# Patient Record
Sex: MALE | Race: WHITE | Employment: OTHER | URBAN - METROPOLITAN AREA
[De-identification: names, ages, dates, MRNs, and addresses within clinical notes are randomized per-mention and may not be internally consistent; named-entity substitution may affect disease eponyms.]

---

## 2017-01-12 DIAGNOSIS — M19.90 ARTHRITIS: ICD-10-CM

## 2017-01-12 RX ORDER — HYDROCODONE BITARTRATE AND ACETAMINOPHEN 5; 325 MG/1; MG/1
TABLET ORAL
Qty: 90 TABLET | Refills: 0 | Status: SHIPPED | OUTPATIENT
Start: 2017-01-12 | End: 2017-02-13 | Stop reason: SDUPTHER

## 2017-01-20 ENCOUNTER — OFFICE VISIT (OUTPATIENT)
Dept: FAMILY MEDICINE CLINIC | Age: 67
End: 2017-01-20

## 2017-01-20 VITALS
SYSTOLIC BLOOD PRESSURE: 130 MMHG | BODY MASS INDEX: 27.39 KG/M2 | DIASTOLIC BLOOD PRESSURE: 84 MMHG | OXYGEN SATURATION: 97 % | WEIGHT: 196.4 LBS | HEART RATE: 86 BPM

## 2017-01-20 DIAGNOSIS — M15.9 PRIMARY OSTEOARTHRITIS INVOLVING MULTIPLE JOINTS: Primary | ICD-10-CM

## 2017-01-20 DIAGNOSIS — F17.200 SMOKER UNMOTIVATED TO QUIT: ICD-10-CM

## 2017-01-20 DIAGNOSIS — I10 ESSENTIAL HYPERTENSION: ICD-10-CM

## 2017-01-20 PROBLEM — M15.0 PRIMARY OSTEOARTHRITIS INVOLVING MULTIPLE JOINTS: Status: ACTIVE | Noted: 2017-01-20

## 2017-01-20 PROCEDURE — 3288F FALL RISK ASSESSMENT DOCD: CPT | Performed by: FAMILY MEDICINE

## 2017-01-20 PROCEDURE — 99214 OFFICE O/P EST MOD 30 MIN: CPT | Performed by: FAMILY MEDICINE

## 2017-02-13 ENCOUNTER — TELEPHONE (OUTPATIENT)
Dept: FAMILY MEDICINE CLINIC | Age: 67
End: 2017-02-13

## 2017-02-13 DIAGNOSIS — M19.90 ARTHRITIS: ICD-10-CM

## 2017-02-13 RX ORDER — HYDROCODONE BITARTRATE AND ACETAMINOPHEN 5; 325 MG/1; MG/1
TABLET ORAL
Qty: 90 TABLET | Refills: 0 | Status: SHIPPED | OUTPATIENT
Start: 2017-02-13 | End: 2017-03-10 | Stop reason: SDUPTHER

## 2017-03-10 ENCOUNTER — TELEPHONE (OUTPATIENT)
Dept: FAMILY MEDICINE CLINIC | Age: 67
End: 2017-03-10

## 2017-03-10 DIAGNOSIS — M19.90 ARTHRITIS: ICD-10-CM

## 2017-03-13 RX ORDER — HYDROCODONE BITARTRATE AND ACETAMINOPHEN 5; 325 MG/1; MG/1
TABLET ORAL
Qty: 90 TABLET | Refills: 0 | Status: SHIPPED | OUTPATIENT
Start: 2017-03-13 | End: 2017-04-10 | Stop reason: SDUPTHER

## 2017-04-10 ENCOUNTER — TELEPHONE (OUTPATIENT)
Dept: FAMILY MEDICINE CLINIC | Age: 67
End: 2017-04-10

## 2017-04-10 DIAGNOSIS — M19.90 ARTHRITIS: ICD-10-CM

## 2017-04-11 RX ORDER — HYDROCODONE BITARTRATE AND ACETAMINOPHEN 5; 325 MG/1; MG/1
TABLET ORAL
Qty: 90 TABLET | Refills: 0 | Status: SHIPPED | OUTPATIENT
Start: 2017-04-11 | End: 2017-05-10 | Stop reason: SDUPTHER

## 2017-04-18 ENCOUNTER — HOSPITAL ENCOUNTER (OUTPATIENT)
Dept: OTHER | Age: 67
Discharge: HOME | End: 2017-04-18
Attending: FAMILY MEDICINE | Admitting: FAMILY MEDICINE

## 2017-04-18 LAB
A/G RATIO: 1.7 (ref 1.1–2.2)
ALBUMIN SERPL-MCNC: 4.6 G/DL (ref 3.4–5)
ALP BLD-CCNC: 108 U/L (ref 40–129)
ALT SERPL-CCNC: 16 U/L (ref 10–40)
ANION GAP SERPL CALCULATED.3IONS-SCNC: 14 MMOL/L (ref 3–16)
AST SERPL-CCNC: 20 U/L (ref 15–37)
BILIRUB SERPL-MCNC: 0.3 MG/DL (ref 0–1)
BUN BLDV-MCNC: 19 MG/DL (ref 7–20)
CALCIUM SERPL-MCNC: 9 MG/DL (ref 8.3–10.6)
CHLORIDE BLD-SCNC: 101 MMOL/L (ref 99–110)
CHOLESTEROL, TOTAL: 246 MG/DL (ref 0–199)
CO2: 25 MMOL/L (ref 21–32)
CREAT SERPL-MCNC: 0.9 MG/DL (ref 0.8–1.3)
ESTIMATED AVERAGE GLUCOSE: 119.8 MG/DL
GFR AFRICAN AMERICAN: >60
GFR NON-AFRICAN AMERICAN: >60
GLOBULIN: 2.7 G/DL
GLUCOSE BLD-MCNC: 109 MG/DL (ref 70–99)
HBA1C MFR BLD: 5.8 %
HDLC SERPL-MCNC: 42 MG/DL (ref 40–60)
LDL CHOLESTEROL CALCULATED: ABNORMAL MG/DL
LDL CHOLESTEROL DIRECT: 146 MG/DL
POTASSIUM SERPL-SCNC: 4.6 MMOL/L (ref 3.5–5.1)
PROSTATE SPECIFIC ANTIGEN: 1.58 NG/ML (ref 0–4)
SODIUM BLD-SCNC: 140 MMOL/L (ref 136–145)
TOTAL PROTEIN: 7.3 G/DL (ref 6.4–8.2)
TRIGL SERPL-MCNC: 324 MG/DL (ref 0–150)
TSH SERPL DL<=0.05 MIU/L-ACNC: 1.1 UIU/ML (ref 0.27–4.2)
VLDLC SERPL CALC-MCNC: ABNORMAL MG/DL

## 2017-04-21 ENCOUNTER — OFFICE VISIT (OUTPATIENT)
Dept: FAMILY MEDICINE CLINIC | Age: 67
End: 2017-04-21

## 2017-04-21 VITALS
BODY MASS INDEX: 27.06 KG/M2 | DIASTOLIC BLOOD PRESSURE: 88 MMHG | WEIGHT: 194 LBS | SYSTOLIC BLOOD PRESSURE: 140 MMHG | RESPIRATION RATE: 12 BRPM | HEART RATE: 81 BPM | OXYGEN SATURATION: 97 %

## 2017-04-21 DIAGNOSIS — M54.50 ACUTE BILATERAL LOW BACK PAIN WITHOUT SCIATICA: ICD-10-CM

## 2017-04-21 DIAGNOSIS — Z23 NEED FOR STREPTOCOCCUS PNEUMONIAE VACCINATION: ICD-10-CM

## 2017-04-21 DIAGNOSIS — R73.9 HYPERGLYCEMIA: Primary | ICD-10-CM

## 2017-04-21 DIAGNOSIS — I10 ESSENTIAL HYPERTENSION: ICD-10-CM

## 2017-04-21 DIAGNOSIS — E78.2 MIXED HYPERLIPIDEMIA: ICD-10-CM

## 2017-04-21 DIAGNOSIS — F17.200 SMOKER UNMOTIVATED TO QUIT: ICD-10-CM

## 2017-04-21 DIAGNOSIS — M15.9 PRIMARY OSTEOARTHRITIS INVOLVING MULTIPLE JOINTS: ICD-10-CM

## 2017-04-21 PROCEDURE — G8510 SCR DEP NEG, NO PLAN REQD: HCPCS | Performed by: FAMILY MEDICINE

## 2017-04-21 PROCEDURE — G0009 ADMIN PNEUMOCOCCAL VACCINE: HCPCS | Performed by: FAMILY MEDICINE

## 2017-04-21 PROCEDURE — 99214 OFFICE O/P EST MOD 30 MIN: CPT | Performed by: FAMILY MEDICINE

## 2017-04-21 PROCEDURE — 3288F FALL RISK ASSESSMENT DOCD: CPT | Performed by: FAMILY MEDICINE

## 2017-04-21 PROCEDURE — 90670 PCV13 VACCINE IM: CPT | Performed by: FAMILY MEDICINE

## 2017-04-21 RX ORDER — MELOXICAM 15 MG/1
15 TABLET ORAL DAILY
Qty: 30 TABLET | Refills: 0 | Status: SHIPPED | OUTPATIENT
Start: 2017-04-21 | End: 2017-07-26

## 2017-04-21 ASSESSMENT — PATIENT HEALTH QUESTIONNAIRE - PHQ9
SUM OF ALL RESPONSES TO PHQ9 QUESTIONS 1 & 2: 0
2. FEELING DOWN, DEPRESSED OR HOPELESS: 0
1. LITTLE INTEREST OR PLEASURE IN DOING THINGS: 0
SUM OF ALL RESPONSES TO PHQ QUESTIONS 1-9: 0

## 2017-05-10 DIAGNOSIS — M19.90 ARTHRITIS: ICD-10-CM

## 2017-05-10 RX ORDER — HYDROCODONE BITARTRATE AND ACETAMINOPHEN 5; 325 MG/1; MG/1
TABLET ORAL
Qty: 90 TABLET | Refills: 0 | Status: SHIPPED | OUTPATIENT
Start: 2017-05-10 | End: 2017-06-06 | Stop reason: SDUPTHER

## 2017-06-06 DIAGNOSIS — M19.90 ARTHRITIS: ICD-10-CM

## 2017-06-09 RX ORDER — HYDROCODONE BITARTRATE AND ACETAMINOPHEN 5; 325 MG/1; MG/1
TABLET ORAL
Qty: 90 TABLET | Refills: 0 | Status: SHIPPED | OUTPATIENT
Start: 2017-06-09 | End: 2017-07-06 | Stop reason: SDUPTHER

## 2017-06-19 DIAGNOSIS — I10 ESSENTIAL HYPERTENSION: ICD-10-CM

## 2017-06-19 RX ORDER — LISINOPRIL AND HYDROCHLOROTHIAZIDE 20; 12.5 MG/1; MG/1
TABLET ORAL
Qty: 180 TABLET | Refills: 0 | Status: SHIPPED | OUTPATIENT
Start: 2017-06-19 | End: 2017-09-17 | Stop reason: SDUPTHER

## 2017-07-06 DIAGNOSIS — M19.90 ARTHRITIS: ICD-10-CM

## 2017-07-06 RX ORDER — HYDROCODONE BITARTRATE AND ACETAMINOPHEN 5; 325 MG/1; MG/1
TABLET ORAL
Qty: 90 TABLET | Refills: 0 | Status: SHIPPED | OUTPATIENT
Start: 2017-07-06 | End: 2017-08-03 | Stop reason: SDUPTHER

## 2017-07-21 ENCOUNTER — TELEPHONE (OUTPATIENT)
Dept: FAMILY MEDICINE CLINIC | Age: 67
End: 2017-07-21

## 2017-07-26 ENCOUNTER — OFFICE VISIT (OUTPATIENT)
Dept: FAMILY MEDICINE CLINIC | Age: 67
End: 2017-07-26

## 2017-07-26 VITALS
SYSTOLIC BLOOD PRESSURE: 140 MMHG | HEART RATE: 84 BPM | BODY MASS INDEX: 25.9 KG/M2 | HEIGHT: 71 IN | DIASTOLIC BLOOD PRESSURE: 84 MMHG | OXYGEN SATURATION: 98 % | WEIGHT: 185 LBS

## 2017-07-26 DIAGNOSIS — E78.2 MIXED HYPERLIPIDEMIA: ICD-10-CM

## 2017-07-26 DIAGNOSIS — I10 ESSENTIAL HYPERTENSION: ICD-10-CM

## 2017-07-26 DIAGNOSIS — F17.200 SMOKER UNMOTIVATED TO QUIT: ICD-10-CM

## 2017-07-26 DIAGNOSIS — M15.9 PRIMARY OSTEOARTHRITIS INVOLVING MULTIPLE JOINTS: Primary | ICD-10-CM

## 2017-07-26 DIAGNOSIS — R73.9 HYPERGLYCEMIA: ICD-10-CM

## 2017-07-26 PROCEDURE — 99214 OFFICE O/P EST MOD 30 MIN: CPT | Performed by: FAMILY MEDICINE

## 2017-08-03 ENCOUNTER — TELEPHONE (OUTPATIENT)
Dept: FAMILY MEDICINE CLINIC | Age: 67
End: 2017-08-03

## 2017-08-03 DIAGNOSIS — M19.90 ARTHRITIS: ICD-10-CM

## 2017-08-04 RX ORDER — HYDROCODONE BITARTRATE AND ACETAMINOPHEN 5; 325 MG/1; MG/1
TABLET ORAL
Qty: 90 TABLET | Refills: 0 | Status: SHIPPED | OUTPATIENT
Start: 2017-08-04 | End: 2017-08-31 | Stop reason: SDUPTHER

## 2017-08-31 ENCOUNTER — TELEPHONE (OUTPATIENT)
Dept: FAMILY MEDICINE CLINIC | Age: 67
End: 2017-08-31

## 2017-08-31 DIAGNOSIS — M19.90 ARTHRITIS: ICD-10-CM

## 2017-08-31 RX ORDER — HYDROCODONE BITARTRATE AND ACETAMINOPHEN 5; 325 MG/1; MG/1
TABLET ORAL
Qty: 90 TABLET | Refills: 0 | Status: SHIPPED | OUTPATIENT
Start: 2017-08-31 | End: 2017-09-28 | Stop reason: SDUPTHER

## 2017-09-17 DIAGNOSIS — I10 ESSENTIAL HYPERTENSION: ICD-10-CM

## 2017-09-18 RX ORDER — LISINOPRIL AND HYDROCHLOROTHIAZIDE 20; 12.5 MG/1; MG/1
TABLET ORAL
Qty: 180 TABLET | Refills: 0 | Status: SHIPPED | OUTPATIENT
Start: 2017-09-18 | End: 2017-12-17 | Stop reason: SDUPTHER

## 2017-09-28 ENCOUNTER — TELEPHONE (OUTPATIENT)
Dept: FAMILY MEDICINE CLINIC | Age: 67
End: 2017-09-28

## 2017-09-28 DIAGNOSIS — M19.90 ARTHRITIS: ICD-10-CM

## 2017-09-28 RX ORDER — HYDROCODONE BITARTRATE AND ACETAMINOPHEN 5; 325 MG/1; MG/1
TABLET ORAL
Qty: 90 TABLET | Refills: 0 | Status: SHIPPED | OUTPATIENT
Start: 2017-09-28 | End: 2017-10-23 | Stop reason: SDUPTHER

## 2017-10-23 ENCOUNTER — OFFICE VISIT (OUTPATIENT)
Dept: FAMILY MEDICINE CLINIC | Age: 67
End: 2017-10-23

## 2017-10-23 VITALS
RESPIRATION RATE: 12 BRPM | SYSTOLIC BLOOD PRESSURE: 142 MMHG | WEIGHT: 189.25 LBS | DIASTOLIC BLOOD PRESSURE: 92 MMHG | OXYGEN SATURATION: 96 % | BODY MASS INDEX: 26.4 KG/M2 | HEART RATE: 86 BPM

## 2017-10-23 DIAGNOSIS — M15.9 PRIMARY OSTEOARTHRITIS INVOLVING MULTIPLE JOINTS: Primary | ICD-10-CM

## 2017-10-23 DIAGNOSIS — S29.019A THORACIC MYOFASCIAL STRAIN, INITIAL ENCOUNTER: ICD-10-CM

## 2017-10-23 DIAGNOSIS — Z23 NEED FOR INFLUENZA VACCINATION: ICD-10-CM

## 2017-10-23 DIAGNOSIS — I10 ESSENTIAL HYPERTENSION: ICD-10-CM

## 2017-10-23 PROCEDURE — 99214 OFFICE O/P EST MOD 30 MIN: CPT | Performed by: FAMILY MEDICINE

## 2017-10-23 PROCEDURE — G0008 ADMIN INFLUENZA VIRUS VAC: HCPCS | Performed by: FAMILY MEDICINE

## 2017-10-23 PROCEDURE — 90662 IIV NO PRSV INCREASED AG IM: CPT | Performed by: FAMILY MEDICINE

## 2017-10-23 RX ORDER — PREDNISONE 20 MG/1
TABLET ORAL
Qty: 15 TABLET | Refills: 0 | Status: SHIPPED | OUTPATIENT
Start: 2017-10-23 | End: 2017-12-22 | Stop reason: ALTCHOICE

## 2017-10-23 RX ORDER — HYDROCODONE BITARTRATE AND ACETAMINOPHEN 5; 325 MG/1; MG/1
TABLET ORAL
Qty: 90 TABLET | Refills: 0 | Status: SHIPPED | OUTPATIENT
Start: 2017-10-23 | End: 2017-11-28 | Stop reason: SDUPTHER

## 2017-10-23 NOTE — PROGRESS NOTES
Subjective:      Patient ID: Corinne Arthur is a 78 y.o. male. HPI Patient presents for re-evaluation of chronic health problems. Pt will like to discuss possible pulled muscle on his back. Patient is very cautious with his activity because of his chronic recurrent back pain. All his discomfort currently is an upper portion of his back are dominantly on the right interscapular area. Lower back overall is doing well. Patient does have some stressors in regards to his wife's health. Patient denies any GI or urinary symptoms. Patient continues to work full-time    Review of Systems  Patient Active Problem List   Diagnosis    Impotence of organic origin    Hyperglycemia    Mixed hyperlipidemia    Essential hypertension    Smoker unmotivated to quit    [facility]bestosis ([facility] [address]    Idiopathic gout    Primary osteoarthritis involving multiple joints       Outpatient Prescriptions Marked as Taking for the 10/23/17 encounter (Office Visit) with Antoinette Richards MD   Medication Sig Dispense Refill    HYDROcodone-acetaminophen (NORCO) 5-325 MG per tablet Take one tablet three times daily as needed. Earliest Fill Date: 9/28/17 90 tablet 0    lisinopril-hydrochlorothiazide (PRINZIDE;ZESTORETIC) 20-12.5 MG per tablet TAKE 1 TABLET TWICE A  tablet 0       No Known Allergies    Social History   Substance Use Topics    Smoking status: Current Every Day Smoker    Smokeless tobacco: Never Used    Alcohol use Yes      Comment: occasional alcohol use       BP (!) 142/92 (Site: Right Arm, Position: Sitting, Cuff Size: Large Adult)   Pulse 86   Resp 12   Wt 189 lb 4 oz (85.8 kg)   SpO2 96%   BMI 26.40 kg/m²     Objective:   Physical Exam   Constitutional: He appears well-developed and well-nourished. He is cooperative. Neck: Carotid bruit is not present. Cardiovascular: Normal rate, regular rhythm and normal heart sounds. No murmur heard.   Pulses:       Dorsalis pedis pulses are 2+ on the right side, and 2+

## 2017-11-22 ENCOUNTER — TELEPHONE (OUTPATIENT)
Dept: FAMILY MEDICINE CLINIC | Age: 67
End: 2017-11-22

## 2017-11-28 DIAGNOSIS — M15.9 PRIMARY OSTEOARTHRITIS INVOLVING MULTIPLE JOINTS: ICD-10-CM

## 2017-11-28 RX ORDER — HYDROCODONE BITARTRATE AND ACETAMINOPHEN 5; 325 MG/1; MG/1
TABLET ORAL
Qty: 90 TABLET | Refills: 0 | Status: SHIPPED | OUTPATIENT
Start: 2017-11-28 | End: 2017-12-27 | Stop reason: SDUPTHER

## 2017-12-17 DIAGNOSIS — I10 ESSENTIAL HYPERTENSION: ICD-10-CM

## 2017-12-18 RX ORDER — LISINOPRIL AND HYDROCHLOROTHIAZIDE 20; 12.5 MG/1; MG/1
TABLET ORAL
Qty: 180 TABLET | Refills: 0 | Status: SHIPPED | OUTPATIENT
Start: 2017-12-18 | End: 2018-01-22 | Stop reason: SDUPTHER

## 2017-12-21 ENCOUNTER — TELEPHONE (OUTPATIENT)
Dept: FAMILY MEDICINE CLINIC | Age: 67
End: 2017-12-21

## 2017-12-22 ENCOUNTER — TELEPHONE (OUTPATIENT)
Dept: FAMILY MEDICINE CLINIC | Age: 67
End: 2017-12-22

## 2017-12-22 ENCOUNTER — OFFICE VISIT (OUTPATIENT)
Dept: FAMILY MEDICINE CLINIC | Age: 67
End: 2017-12-22

## 2017-12-22 VITALS
DIASTOLIC BLOOD PRESSURE: 84 MMHG | HEART RATE: 88 BPM | SYSTOLIC BLOOD PRESSURE: 138 MMHG | BODY MASS INDEX: 26.46 KG/M2 | HEIGHT: 71 IN | WEIGHT: 189 LBS

## 2017-12-22 DIAGNOSIS — M10.9 ACUTE GOUT INVOLVING TOE OF RIGHT FOOT, UNSPECIFIED CAUSE: Primary | ICD-10-CM

## 2017-12-22 PROCEDURE — 99213 OFFICE O/P EST LOW 20 MIN: CPT | Performed by: PHYSICIAN ASSISTANT

## 2017-12-22 RX ORDER — COLCHICINE 0.6 MG/1
TABLET ORAL
Qty: 30 TABLET | Refills: 3 | Status: SHIPPED | OUTPATIENT
Start: 2017-12-22 | End: 2018-03-03

## 2017-12-22 ASSESSMENT — ENCOUNTER SYMPTOMS: COLOR CHANGE: 1

## 2017-12-22 NOTE — PROGRESS NOTES
Subjective:      Patient ID: Dereck Cervantes is a 78 y.o. male. HPI  Patient is here today with a 3 day history of right great toe pain. He has gout. He says he hasn't had an attack in a couple years. He usually drinks 3-4 beers nightly. He hasn't changed his diet lately either. He says that hasn't changed. He has had no fever, body aches or chills. He already takes Norco for chronic pain issues. Review of Systems   Constitutional: Negative for appetite change, chills and fever. Musculoskeletal: Positive for arthralgias (right great toe pain). Skin: Positive for color change. Neurological: Negative for dizziness, numbness and headaches. Objective:   Physical Exam   Constitutional: He is oriented to person, place, and time. Vital signs are normal. He appears well-developed and well-nourished. He is cooperative. Musculoskeletal:   Tender to palpate right great toe at 1st MTP joint, able to move his toe but it is painful with movement, limping, pedal pulses intact   Neurological: He is alert and oriented to person, place, and time. No sensory deficit. Skin: Skin is warm, dry and intact. There is erythema (right great toe). Assessment:      Teodora Starks was seen today for gout. Diagnoses and all orders for this visit:    Acute gout involving toe of right foot, unspecified cause  -     colchicine (COLCRYS) 0.6 MG tablet; Take BID until symptoms getting better then stop  -     URIC ACID; Future             Plan:      Get uric acid level.

## 2017-12-22 NOTE — TELEPHONE ENCOUNTER
colchicine (COLCRYS) 0.6 MG tablet 30 tablet 3 12/22/2017     Sig: Take BID until symptoms getting better then stop      Per pt's spouse the above is not covered by insurance    Is there an alternative?     Please send to Ian Dillon

## 2017-12-27 DIAGNOSIS — M15.9 PRIMARY OSTEOARTHRITIS INVOLVING MULTIPLE JOINTS: ICD-10-CM

## 2017-12-27 RX ORDER — HYDROCODONE BITARTRATE AND ACETAMINOPHEN 5; 325 MG/1; MG/1
TABLET ORAL
Qty: 90 TABLET | Refills: 0 | Status: SHIPPED | OUTPATIENT
Start: 2017-12-27 | End: 2018-01-22 | Stop reason: SDUPTHER

## 2018-01-22 ENCOUNTER — OFFICE VISIT (OUTPATIENT)
Dept: FAMILY MEDICINE CLINIC | Age: 68
End: 2018-01-22

## 2018-01-22 VITALS
DIASTOLIC BLOOD PRESSURE: 90 MMHG | BODY MASS INDEX: 27.27 KG/M2 | RESPIRATION RATE: 12 BRPM | WEIGHT: 195.5 LBS | SYSTOLIC BLOOD PRESSURE: 148 MMHG | HEART RATE: 100 BPM | OXYGEN SATURATION: 97 %

## 2018-01-22 DIAGNOSIS — I10 ESSENTIAL HYPERTENSION: Primary | ICD-10-CM

## 2018-01-22 DIAGNOSIS — R73.9 HYPERGLYCEMIA: ICD-10-CM

## 2018-01-22 DIAGNOSIS — F17.200 SMOKER UNMOTIVATED TO QUIT: ICD-10-CM

## 2018-01-22 DIAGNOSIS — M15.9 PRIMARY OSTEOARTHRITIS INVOLVING MULTIPLE JOINTS: ICD-10-CM

## 2018-01-22 PROCEDURE — 3288F FALL RISK ASSESSMENT DOCD: CPT | Performed by: FAMILY MEDICINE

## 2018-01-22 PROCEDURE — 99214 OFFICE O/P EST MOD 30 MIN: CPT | Performed by: FAMILY MEDICINE

## 2018-01-22 RX ORDER — LISINOPRIL AND HYDROCHLOROTHIAZIDE 20; 12.5 MG/1; MG/1
TABLET ORAL
Qty: 180 TABLET | Refills: 1 | Status: SHIPPED | OUTPATIENT
Start: 2018-01-22 | End: 2018-03-17 | Stop reason: SDUPTHER

## 2018-01-22 RX ORDER — HYDROCODONE BITARTRATE AND ACETAMINOPHEN 5; 325 MG/1; MG/1
TABLET ORAL
Qty: 90 TABLET | Refills: 0 | Status: SHIPPED | OUTPATIENT
Start: 2018-01-22 | End: 2018-02-21 | Stop reason: SDUPTHER

## 2018-01-27 ENCOUNTER — HOSPITAL ENCOUNTER (OUTPATIENT)
Dept: OTHER | Age: 68
Discharge: HOME | End: 2018-01-27
Attending: FAMILY MEDICINE | Admitting: FAMILY MEDICINE

## 2018-01-27 LAB
A/G RATIO: 1.7 (ref 1.1–2.2)
ALBUMIN SERPL-MCNC: 4.4 G/DL (ref 3.4–5)
ALP BLD-CCNC: 96 U/L (ref 40–129)
ALT SERPL-CCNC: 18 U/L (ref 10–40)
ANION GAP SERPL CALCULATED.3IONS-SCNC: 13 MMOL/L (ref 3–16)
AST SERPL-CCNC: 24 U/L (ref 15–37)
BILIRUB SERPL-MCNC: 0.4 MG/DL (ref 0–1)
BUN BLDV-MCNC: 15 MG/DL (ref 7–20)
CALCIUM SERPL-MCNC: 9.1 MG/DL (ref 8.3–10.6)
CHLORIDE BLD-SCNC: 103 MMOL/L (ref 99–110)
CHOLESTEROL, TOTAL: 245 MG/DL (ref 0–199)
CO2: 26 MMOL/L (ref 21–32)
CREAT SERPL-MCNC: 0.8 MG/DL (ref 0.8–1.3)
GFR AFRICAN AMERICAN: >60
GFR NON-AFRICAN AMERICAN: >60
GLOBULIN: 2.6 G/DL
GLUCOSE BLD-MCNC: 109 MG/DL (ref 70–99)
HDLC SERPL-MCNC: 46 MG/DL (ref 40–60)
LDL CHOLESTEROL CALCULATED: 162 MG/DL
POTASSIUM SERPL-SCNC: 4.5 MMOL/L (ref 3.5–5.1)
SODIUM BLD-SCNC: 142 MMOL/L (ref 136–145)
TOTAL PROTEIN: 7 G/DL (ref 6.4–8.2)
TRIGL SERPL-MCNC: 184 MG/DL (ref 0–150)
URIC ACID, SERUM: 9 MG/DL (ref 3.5–7.2)
VLDLC SERPL CALC-MCNC: 37 MG/DL

## 2018-01-28 LAB
ESTIMATED AVERAGE GLUCOSE: 119.8 MG/DL
HBA1C MFR BLD: 5.8 %

## 2018-02-21 ENCOUNTER — TELEPHONE (OUTPATIENT)
Dept: FAMILY MEDICINE CLINIC | Age: 68
End: 2018-02-21

## 2018-02-21 DIAGNOSIS — M15.9 PRIMARY OSTEOARTHRITIS INVOLVING MULTIPLE JOINTS: ICD-10-CM

## 2018-02-21 RX ORDER — HYDROCODONE BITARTRATE AND ACETAMINOPHEN 5; 325 MG/1; MG/1
TABLET ORAL
Qty: 90 TABLET | Refills: 0 | Status: SHIPPED | OUTPATIENT
Start: 2018-02-21 | End: 2018-03-22 | Stop reason: SDUPTHER

## 2018-03-03 ENCOUNTER — OFFICE VISIT (OUTPATIENT)
Dept: FAMILY MEDICINE CLINIC | Age: 68
End: 2018-03-03

## 2018-03-03 VITALS
WEIGHT: 193 LBS | OXYGEN SATURATION: 98 % | SYSTOLIC BLOOD PRESSURE: 144 MMHG | BODY MASS INDEX: 26.92 KG/M2 | DIASTOLIC BLOOD PRESSURE: 82 MMHG | HEART RATE: 84 BPM

## 2018-03-03 DIAGNOSIS — L84 FOOT CALLUS: ICD-10-CM

## 2018-03-03 DIAGNOSIS — M70.41 PREPATELLAR BURSITIS OF RIGHT KNEE: Primary | ICD-10-CM

## 2018-03-03 PROCEDURE — 99213 OFFICE O/P EST LOW 20 MIN: CPT | Performed by: FAMILY MEDICINE

## 2018-03-03 RX ORDER — PREDNISONE 20 MG/1
TABLET ORAL
Qty: 15 TABLET | Refills: 0 | Status: SHIPPED | OUTPATIENT
Start: 2018-03-03 | End: 2018-04-23

## 2018-03-03 NOTE — PATIENT INSTRUCTIONS
Patient Education        Kneecap Bursitis: Care Instructions  Your Care Instructions    Bursitis is inflammation of the bursa. A bursa is a small sac of fluid that cushions a joint and helps it move easily. Bursitis of the kneecap is inflammation of the bursa found between the front of the kneecap and the skin. Kneeling for a long time can cause kneecap bursitis, which can develop into an egg-shaped bump on the front of the kneecap. Bursitis usually gets better if you avoid the activity that caused it. If it lasts or gets worse despite home treatment, your doctor may draw fluid from the bursa through a needle. This may relieve your pain and help your doctor know if you have an infection. If so, your doctor will prescribe antibiotics. If you have inflammation only, you may get a corticosteroid shot to reduce swelling and pain. Your doctor may recommend physical therapy and stretching activities. Rarely, surgery is needed to drain or remove the bursa. Follow-up care is a key part of your treatment and safety. Be sure to make and go to all appointments, and call your doctor if you are having problems. It's also a good idea to know your test results and keep a list of the medicines you take. How can you care for yourself at home? · Put ice or a cold pack on your kneecap for 10 to 20 minutes at a time. Put a thin cloth between the ice and your skin. · After 3 days of using ice, you may use heat on your kneecap. You can use a hot water bottle, a heating pad set on low, or a warm, moist towel. · Prop up the sore leg on a pillow when you ice it or anytime you sit or lie down during the next 3 days. Try to keep it above the level of your heart. This will help reduce swelling. · Rest your knee. Stop any activities that cause pain. Switch to activities that do not stress your knee. · Take your medicines exactly as prescribed. Call your doctor if you think you are having a problem with your medicine.   · Ask your doctor if you can take an over-the-counter pain medicine, such as acetaminophen (Tylenol), ibuprofen (Advil, Motrin), or naproxen (Aleve). Be safe with medicines. Read and follow all instructions on the label. · To prevent and ease kneecap bursitis during work, play, and daily activities:  [address] when kneeling on hard surfaces. Avoid kneeling for too long at a time. ¨ Strengthen and stretch your leg muscles. ¨ Avoid deep knee bends. · You can slowly return to the activity that caused the pain, but do it with less effort until you can do it without pain or swelling. Be sure to warm up before and stretch after you do the activity. When should you call for help? Call your doctor now or seek immediate medical care if:  ? · You have a fever. ? · You have increased swelling or redness in your knee area. ? · You cannot use your knee, or the pain in your knee gets worse. ? Watch closely for changes in your health, and be sure to contact your doctor if:  ? · You have pain for 2 weeks or longer despite home treatment. Where can you learn more? Go to [email or web address] [email or web address].[email or web address] org and sign in to your [employer] account. Enter N862 in the [employer]Well.com box to learn more about \"Kneecap Bursitis: Care Instructions. \"     If you do not have an account, please click on the \"Sign Up Now\" link. Current as of: March 21, 2017  Content Version: 11.5  © 4639-7693 Healthwise, [employer]. Care instructions adapted under license by [facility] [address] If you have questions about a medical condition or this instruction, always ask your healthcare professional. [place] [address] any warranty or liability for your use of this information.

## 2018-03-17 DIAGNOSIS — I10 ESSENTIAL HYPERTENSION: ICD-10-CM

## 2018-03-19 RX ORDER — LISINOPRIL AND HYDROCHLOROTHIAZIDE 20; 12.5 MG/1; MG/1
TABLET ORAL
Qty: 180 TABLET | Refills: 0 | Status: SHIPPED | OUTPATIENT
Start: 2018-03-19 | End: 2018-06-16 | Stop reason: SDUPTHER

## 2018-03-22 ENCOUNTER — TELEPHONE (OUTPATIENT)
Dept: FAMILY MEDICINE CLINIC | Age: 68
End: 2018-03-22

## 2018-03-22 DIAGNOSIS — M15.9 PRIMARY OSTEOARTHRITIS INVOLVING MULTIPLE JOINTS: ICD-10-CM

## 2018-03-22 RX ORDER — HYDROCODONE BITARTRATE AND ACETAMINOPHEN 5; 325 MG/1; MG/1
TABLET ORAL
Qty: 90 TABLET | Refills: 0 | Status: SHIPPED | OUTPATIENT
Start: 2018-03-22 | End: 2018-04-23 | Stop reason: SDUPTHER

## 2018-04-23 ENCOUNTER — OFFICE VISIT (OUTPATIENT)
Dept: FAMILY MEDICINE CLINIC | Age: 68
End: 2018-04-23

## 2018-04-23 VITALS
HEIGHT: 71 IN | WEIGHT: 190.13 LBS | RESPIRATION RATE: 12 BRPM | BODY MASS INDEX: 26.62 KG/M2 | DIASTOLIC BLOOD PRESSURE: 90 MMHG | SYSTOLIC BLOOD PRESSURE: 132 MMHG | HEART RATE: 88 BPM

## 2018-04-23 DIAGNOSIS — F17.200 SMOKER UNMOTIVATED TO QUIT: ICD-10-CM

## 2018-04-23 DIAGNOSIS — Z23 NEED FOR VACCINATION AGAINST STREPTOCOCCUS PNEUMONIAE: ICD-10-CM

## 2018-04-23 DIAGNOSIS — M15.9 PRIMARY OSTEOARTHRITIS INVOLVING MULTIPLE JOINTS: Primary | ICD-10-CM

## 2018-04-23 DIAGNOSIS — E78.2 MIXED HYPERLIPIDEMIA: ICD-10-CM

## 2018-04-23 PROCEDURE — G8510 SCR DEP NEG, NO PLAN REQD: HCPCS | Performed by: FAMILY MEDICINE

## 2018-04-23 PROCEDURE — G0009 ADMIN PNEUMOCOCCAL VACCINE: HCPCS | Performed by: FAMILY MEDICINE

## 2018-04-23 PROCEDURE — 99214 OFFICE O/P EST MOD 30 MIN: CPT | Performed by: FAMILY MEDICINE

## 2018-04-23 PROCEDURE — 90732 PPSV23 VACC 2 YRS+ SUBQ/IM: CPT | Performed by: FAMILY MEDICINE

## 2018-04-23 RX ORDER — ATORVASTATIN CALCIUM 20 MG/1
20 TABLET, FILM COATED ORAL DAILY
Qty: 90 TABLET | Refills: 3 | Status: SHIPPED | OUTPATIENT
Start: 2018-04-23 | End: 2019-01-09 | Stop reason: SDUPTHER

## 2018-04-23 RX ORDER — HYDROCODONE BITARTRATE AND ACETAMINOPHEN 5; 325 MG/1; MG/1
TABLET ORAL
Qty: 90 TABLET | Refills: 0 | Status: SHIPPED | OUTPATIENT
Start: 2018-04-23 | End: 2018-05-21 | Stop reason: SDUPTHER

## 2018-04-23 ASSESSMENT — PATIENT HEALTH QUESTIONNAIRE - PHQ9
1. LITTLE INTEREST OR PLEASURE IN DOING THINGS: 0
2. FEELING DOWN, DEPRESSED OR HOPELESS: 0
SUM OF ALL RESPONSES TO PHQ9 QUESTIONS 1 & 2: 0
SUM OF ALL RESPONSES TO PHQ QUESTIONS 1-9: 0

## 2018-05-21 DIAGNOSIS — M15.9 PRIMARY OSTEOARTHRITIS INVOLVING MULTIPLE JOINTS: ICD-10-CM

## 2018-05-21 RX ORDER — HYDROCODONE BITARTRATE AND ACETAMINOPHEN 5; 325 MG/1; MG/1
TABLET ORAL
Qty: 90 TABLET | Refills: 0 | Status: SHIPPED | OUTPATIENT
Start: 2018-05-21 | End: 2018-06-18 | Stop reason: SDUPTHER

## 2018-05-29 ENCOUNTER — TELEPHONE (OUTPATIENT)
Dept: FAMILY MEDICINE CLINIC | Age: 68
End: 2018-05-29

## 2018-05-29 RX ORDER — PREDNISONE 20 MG/1
TABLET ORAL
Qty: 15 TABLET | Refills: 0 | Status: SHIPPED | OUTPATIENT
Start: 2018-05-29 | End: 2018-07-18

## 2018-06-16 DIAGNOSIS — I10 ESSENTIAL HYPERTENSION: ICD-10-CM

## 2018-06-16 RX ORDER — LISINOPRIL AND HYDROCHLOROTHIAZIDE 20; 12.5 MG/1; MG/1
TABLET ORAL
Qty: 180 TABLET | Refills: 0 | Status: SHIPPED | OUTPATIENT
Start: 2018-06-16 | End: 2018-09-21 | Stop reason: SDUPTHER

## 2018-06-18 DIAGNOSIS — M15.9 PRIMARY OSTEOARTHRITIS INVOLVING MULTIPLE JOINTS: ICD-10-CM

## 2018-06-18 RX ORDER — HYDROCODONE BITARTRATE AND ACETAMINOPHEN 5; 325 MG/1; MG/1
TABLET ORAL
Qty: 90 TABLET | Refills: 0 | Status: SHIPPED | OUTPATIENT
Start: 2018-06-18 | End: 2018-07-18 | Stop reason: SDUPTHER

## 2018-07-18 ENCOUNTER — OFFICE VISIT (OUTPATIENT)
Dept: FAMILY MEDICINE CLINIC | Age: 68
End: 2018-07-18

## 2018-07-18 VITALS
RESPIRATION RATE: 12 BRPM | OXYGEN SATURATION: 96 % | DIASTOLIC BLOOD PRESSURE: 82 MMHG | HEART RATE: 79 BPM | WEIGHT: 190 LBS | SYSTOLIC BLOOD PRESSURE: 136 MMHG | BODY MASS INDEX: 26.5 KG/M2

## 2018-07-18 DIAGNOSIS — M15.9 PRIMARY OSTEOARTHRITIS INVOLVING MULTIPLE JOINTS: Primary | ICD-10-CM

## 2018-07-18 DIAGNOSIS — F17.200 SMOKER UNMOTIVATED TO QUIT: ICD-10-CM

## 2018-07-18 DIAGNOSIS — I10 ESSENTIAL HYPERTENSION: ICD-10-CM

## 2018-07-18 DIAGNOSIS — R73.9 HYPERGLYCEMIA: ICD-10-CM

## 2018-07-18 PROCEDURE — 99214 OFFICE O/P EST MOD 30 MIN: CPT | Performed by: FAMILY MEDICINE

## 2018-07-18 RX ORDER — HYDROCODONE BITARTRATE AND ACETAMINOPHEN 5; 325 MG/1; MG/1
TABLET ORAL
Qty: 90 TABLET | Refills: 0 | Status: SHIPPED | OUTPATIENT
Start: 2018-07-18 | End: 2018-08-15 | Stop reason: SDUPTHER

## 2018-07-18 NOTE — PROGRESS NOTES
Subjective:      Patient ID: Tiffany Forde is a 80 y.o. male. CC: Patient presents for re-evaluation of chronic health problems including osteoarthritis and degenerative disc disease, hyperglycemia and smoking and hypertension. HPI pt states he is here for a follow up on his medication. Patient continues to work 40 hours a week with fairly physical job. He still smoking a pack of cigarettes per day and doesn't want stop. He continues taking pain pills 2-3 times a day but basically mother 80 pills in a month. He denies any issues with bowel or bladder. Patient is very compliant with medications with no adverse reactions. Review of Systems  Patient Active Problem List   Diagnosis    Impotence of organic origin    Hyperglycemia    Mixed hyperlipidemia    Essential hypertension    Smoker unmotivated to quit    Asbestosis ([facility] [address]    Idiopathic gout    Primary osteoarthritis involving multiple joints       Outpatient Prescriptions Marked as Taking for the 7/18/18 encounter (Office Visit) with Dion Awad MD   Medication Sig Dispense Refill    HYDROcodone-acetaminophen (NORCO) 5-325 MG per tablet Take one tablet three times daily as needed. 90 tablet 0    lisinopril-hydrochlorothiazide (PRINZIDE;ZESTORETIC) 20-12.5 MG per tablet TAKE 1 TABLET TWICE A  tablet 0    atorvastatin (LIPITOR) 20 MG tablet Take 1 tablet by mouth daily 90 tablet 3       No Known Allergies    Social History   Substance Use Topics    Smoking status: Current Every Day Smoker    Smokeless tobacco: Never Used    Alcohol use Yes      Comment: occasional alcohol use       /82 (Site: Right Arm, Position: Sitting, Cuff Size: Medium Adult)   Pulse 79   Resp 12   Wt 190 lb (86.2 kg)   SpO2 96%   BMI 26.50 kg/m²     Objective:   Physical Exam   Constitutional: He appears well-developed and well-nourished. He is cooperative. No distress. Neck: Carotid bruit is not present.    Cardiovascular: Normal rate, regular rhythm and normal heart sounds. No murmur heard. Pulses:       Dorsalis pedis pulses are 2+ on the right side, and 2+ on the left side. Posterior tibial pulses are 2+ on the right side, and 2+ on the left side. Pulmonary/Chest: Effort normal and breath sounds normal.   Musculoskeletal: Normal range of motion. He exhibits no edema or tenderness. Right knee: He exhibits deformity. He exhibits normal range of motion and no swelling. Lacerations: Crepitus of the patella. Left knee: Normal.        Lumbar back: He exhibits no tenderness, no deformity and no spasm. Neurological: He is alert. He has normal strength. No sensory deficit. Skin: No rash noted. Psychiatric: He has a normal mood and affect. His behavior is normal.       Assessment:      Princess Schmidt was seen today for follow-up. Diagnoses and all orders for this visit:    Primary osteoarthritis involving multiple joints  -     HYDROcodone-acetaminophen (NORCO) 5-325 MG per tablet; Take one tablet three times daily as needed. Hyperglycemia    Essential hypertension    Smoker unmotivated to quit      OARRS report checked        Plan:      Laboratory profiling reviewed from earlier in the year and additional labs ordered  Discussed stopping smoking  Maintain current pain medication  No change of other medications  RTC 3 months    Please note that this chart was generated using Dragon dictation software. Although every effort was made to ensure the accuracy of this automated transcription, some errors in transcription may have occurred.

## 2018-08-15 DIAGNOSIS — M15.9 PRIMARY OSTEOARTHRITIS INVOLVING MULTIPLE JOINTS: ICD-10-CM

## 2018-08-15 RX ORDER — HYDROCODONE BITARTRATE AND ACETAMINOPHEN 5; 325 MG/1; MG/1
TABLET ORAL
Qty: 90 TABLET | Refills: 0 | Status: SHIPPED | OUTPATIENT
Start: 2018-08-15 | End: 2018-09-12 | Stop reason: SDUPTHER

## 2018-08-15 NOTE — TELEPHONE ENCOUNTER
Medication:   Requested Prescriptions     Pending Prescriptions Disp Refills    HYDROcodone-acetaminophen (NORCO) 5-325 MG per tablet 90 tablet 0     Sig: Take one tablet three times daily as needed. Last Filled:  07/18/18  Patient Phone Number: [phone] (home) [phone] (work)    Last appt: 7/18/2018   Next appt: Visit date not found    Last OARRS:   RX Monitoring 7/18/2018   Attestation The Prescription Monitoring Report for this patient was reviewed today.    Documentation -       Preferred Pharmacy:   [facility], [address] [place] [address],[address] [phone] [facility] [phone]  [address] [place] [address]  Phone: [phone] Fax: [phone]

## 2018-08-15 NOTE — TELEPHONE ENCOUNTER
Disp Refills Start End    HYDROcodone-acetaminophen (NORCO) 5-325 MG per tablet 90 tablet 0 7/18/2018 8/17/2018    Sig: Take one tablet three times daily as needed.       wale on [facility] in chart

## 2018-09-12 DIAGNOSIS — M15.9 PRIMARY OSTEOARTHRITIS INVOLVING MULTIPLE JOINTS: ICD-10-CM

## 2018-09-12 RX ORDER — HYDROCODONE BITARTRATE AND ACETAMINOPHEN 5; 325 MG/1; MG/1
1 TABLET ORAL EVERY 8 HOURS PRN
Qty: 90 TABLET | Refills: 0 | Status: SHIPPED | OUTPATIENT
Start: 2018-09-12 | End: 2018-09-12 | Stop reason: SDUPTHER

## 2018-09-12 RX ORDER — HYDROCODONE BITARTRATE AND ACETAMINOPHEN 5; 325 MG/1; MG/1
1 TABLET ORAL EVERY 8 HOURS PRN
Qty: 90 TABLET | Refills: 0 | Status: SHIPPED | OUTPATIENT
Start: 2018-09-12 | End: 2018-10-10 | Stop reason: SDUPTHER

## 2018-09-12 NOTE — TELEPHONE ENCOUNTER
HYDROcodone-acetaminophen (NORCO) 5-325 MG per tablet 90 tablet 0 8/15/2018 9/14/2018    Sig: Take one tablet three times daily as needed.       wale [address] in chart

## 2018-09-12 NOTE — TELEPHONE ENCOUNTER
Medication:   Requested Prescriptions      No prescriptions requested or ordered in this encounter      Last Filled:  8/15/18    Patient Phone Number: [phone] (home) [phone] (work)    Last appt: 7/18/2018   Next appt: 10/16/18    Last OARRS:   RX Monitoring 7/18/2018   Attestation The Prescription Monitoring Report for this patient was reviewed today.    Documentation -       Preferred Pharmacy:   [address], [address] [place] [address],[facility] Floor [phone] Nick Bergman [phone]  [address] [place] [address]  Phone: [phone] Fax: [phone]

## 2018-09-21 DIAGNOSIS — I10 ESSENTIAL HYPERTENSION: ICD-10-CM

## 2018-09-21 RX ORDER — LISINOPRIL AND HYDROCHLOROTHIAZIDE 20; 12.5 MG/1; MG/1
TABLET ORAL
Qty: 180 TABLET | Refills: 0 | Status: SHIPPED | OUTPATIENT
Start: 2018-09-21 | End: 2018-12-20 | Stop reason: SDUPTHER

## 2018-10-01 RX ORDER — PREDNISONE 20 MG/1
TABLET ORAL
Qty: 15 TABLET | Refills: 0 | OUTPATIENT
Start: 2018-10-01

## 2018-10-02 ENCOUNTER — OFFICE VISIT (OUTPATIENT)
Dept: FAMILY MEDICINE CLINIC | Age: 68
End: 2018-10-02
Payer: MEDICARE

## 2018-10-02 VITALS
DIASTOLIC BLOOD PRESSURE: 84 MMHG | OXYGEN SATURATION: 96 % | HEART RATE: 78 BPM | WEIGHT: 187 LBS | BODY MASS INDEX: 26.08 KG/M2 | SYSTOLIC BLOOD PRESSURE: 130 MMHG

## 2018-10-02 DIAGNOSIS — M70.42 PREPATELLAR BURSITIS OF LEFT KNEE: Primary | ICD-10-CM

## 2018-10-02 PROCEDURE — 99213 OFFICE O/P EST LOW 20 MIN: CPT | Performed by: FAMILY MEDICINE

## 2018-10-02 RX ORDER — PREDNISONE 20 MG/1
TABLET ORAL
Qty: 15 TABLET | Refills: 0 | Status: SHIPPED | OUTPATIENT
Start: 2018-10-02 | End: 2018-10-16

## 2018-10-02 RX ORDER — COLCHICINE 0.6 MG/1
TABLET, FILM COATED ORAL
Qty: 30 TABLET | Refills: 0 | OUTPATIENT
Start: 2018-10-02

## 2018-10-10 DIAGNOSIS — M15.9 PRIMARY OSTEOARTHRITIS INVOLVING MULTIPLE JOINTS: ICD-10-CM

## 2018-10-10 RX ORDER — HYDROCODONE BITARTRATE AND ACETAMINOPHEN 5; 325 MG/1; MG/1
1 TABLET ORAL EVERY 8 HOURS PRN
Qty: 90 TABLET | Refills: 0 | Status: SHIPPED | OUTPATIENT
Start: 2018-10-10 | End: 2018-11-09 | Stop reason: SDUPTHER

## 2018-10-15 ENCOUNTER — HOSPITAL ENCOUNTER (OUTPATIENT)
Age: 68
Discharge: HOME | End: 2018-10-15
Payer: MEDICARE

## 2018-10-15 LAB
ANION GAP SERPL CALCULATED.3IONS-SCNC: 14 MMOL/L (ref 3–16)
BUN BLDV-MCNC: 16 MG/DL (ref 7–20)
CALCIUM SERPL-MCNC: 9.3 MG/DL (ref 8.3–10.6)
CHLORIDE BLD-SCNC: 98 MMOL/L (ref 99–110)
CO2: 26 MMOL/L (ref 21–32)
CREAT SERPL-MCNC: 0.9 MG/DL (ref 0.8–1.3)
ESTIMATED AVERAGE GLUCOSE: 125.5 MG/DL
GFR AFRICAN AMERICAN: >60
GFR NON-AFRICAN AMERICAN: >60
GLUCOSE BLD-MCNC: 106 MG/DL (ref 70–99)
HBA1C MFR BLD: 6 %
POTASSIUM SERPL-SCNC: 4.8 MMOL/L (ref 3.5–5.1)
SODIUM BLD-SCNC: 138 MMOL/L (ref 136–145)

## 2018-10-15 PROCEDURE — 36415 COLL VENOUS BLD VENIPUNCTURE: CPT

## 2018-10-15 PROCEDURE — 80048 BASIC METABOLIC PNL TOTAL CA: CPT

## 2018-10-15 PROCEDURE — 83036 HEMOGLOBIN GLYCOSYLATED A1C: CPT

## 2018-10-16 ENCOUNTER — OFFICE VISIT (OUTPATIENT)
Dept: FAMILY MEDICINE CLINIC | Age: 68
End: 2018-10-16
Payer: MEDICARE

## 2018-10-16 VITALS
BODY MASS INDEX: 26.38 KG/M2 | HEART RATE: 82 BPM | DIASTOLIC BLOOD PRESSURE: 82 MMHG | SYSTOLIC BLOOD PRESSURE: 134 MMHG | RESPIRATION RATE: 12 BRPM | WEIGHT: 189.13 LBS

## 2018-10-16 DIAGNOSIS — I10 ESSENTIAL HYPERTENSION: ICD-10-CM

## 2018-10-16 DIAGNOSIS — M15.9 PRIMARY OSTEOARTHRITIS INVOLVING MULTIPLE JOINTS: ICD-10-CM

## 2018-10-16 DIAGNOSIS — R73.9 HYPERGLYCEMIA: Primary | ICD-10-CM

## 2018-10-16 DIAGNOSIS — J61 ASBESTOSIS (HCC): ICD-10-CM

## 2018-10-16 DIAGNOSIS — Z23 NEED FOR INFLUENZA VACCINATION: ICD-10-CM

## 2018-10-16 PROCEDURE — G0008 ADMIN INFLUENZA VIRUS VAC: HCPCS | Performed by: FAMILY MEDICINE

## 2018-10-16 PROCEDURE — 99214 OFFICE O/P EST MOD 30 MIN: CPT | Performed by: FAMILY MEDICINE

## 2018-10-16 PROCEDURE — 90662 IIV NO PRSV INCREASED AG IM: CPT | Performed by: FAMILY MEDICINE

## 2018-10-16 NOTE — PROGRESS NOTES
Subjective:      Patient ID: Malini Jj is a 80 y.o. male. CC: Patient presents for re-evaluation of chronic health problems including hypertension, osteoarthritis and hyperglycemia. HPI Pt states he is here for a follow up on his hypertension. Patient feels that his left knee has significantly improved the prednisone burst.  He continues to work full time and is watching closely how he does activities as he knows certain maneuvers will aggravate his arthritis. Patient continues with pain medication up to 3 times a day. Patient very compliant with his blood pressure management. He continues to watch salt in his diet. Unfortunately still continues to smoke on a daily basis. Review of Systems  Patient Active Problem List   Diagnosis    Impotence of organic origin    Hyperglycemia    Mixed hyperlipidemia    Essential hypertension    Smoker unmotivated to quit    Asbestosis ([address]    Idiopathic gout    Primary osteoarthritis involving multiple joints       Outpatient Prescriptions Marked as Taking for the 10/16/18 encounter (Office Visit) with Jeri Chavez MD   Medication Sig Dispense Refill    HYDROcodone-acetaminophen (NORCO) 5-325 MG per tablet Take 1 tablet by mouth every 8 hours as needed for Pain for up to 30 days. . 90 tablet 0    lisinopril-hydrochlorothiazide (PRINZIDE;ZESTORETIC) 20-12.5 MG per tablet TAKE 1 TABLET TWICE A  tablet 0    atorvastatin (LIPITOR) 20 MG tablet Take 1 tablet by mouth daily 90 tablet 3       No Known Allergies    Social History   Substance Use Topics    Smoking status: Current Every Day Smoker    Smokeless tobacco: Never Used    Alcohol use Yes      Comment: occasional alcohol use       BP (!) 152/80 (Site: Right Upper Arm, Position: Sitting, Cuff Size: Medium Adult)   Pulse 82   Resp 12   Wt 189 lb 2 oz (85.8 kg)   BMI 26.38 kg/m²     Objective:   Physical Exam   Constitutional: He appears well-developed and well-nourished.  He is cooperative. No distress. Neck: Carotid bruit is not present. Cardiovascular: Normal rate, regular rhythm and normal heart sounds. No murmur heard. Pulses:       Dorsalis pedis pulses are 2+ on the right side, and 2+ on the left side. Posterior tibial pulses are 2+ on the right side, and 2+ on the left side. Pulmonary/Chest: Effort normal and breath sounds normal.   Musculoskeletal: Normal range of motion. He exhibits no edema or tenderness. Right knee: He exhibits deformity. He exhibits normal range of motion and no swelling. Lacerations: Crepitus of the patella. Left knee: Normal.        Lumbar back: He exhibits no tenderness, no deformity and no spasm. Neurological: He is alert. He has normal strength. No sensory deficit. Skin: No rash noted. Psychiatric: He has a normal mood and affect. His behavior is normal.       Assessment:      Denzel Ashford was seen today for follow-up. Diagnoses and all orders for this visit:    Hyperglycemia    Need for influenza vaccination    Essential hypertension    Primary osteoarthritis involving multiple joints    Asbestosis ([facility] [address]    Other orders  -     INFLUENZA, HIGH DOSE, 65 YRS +, IM, PF, PREFILL SYR, 0.5ML (FLUZONE HD)      OARRS report checked        Plan:      Pt appears stable & reviewed labs with patient. Maintain pain medications on a when necessary basis. Chest x-ray ordered in regards to asbestosis history    Patient received counseling on the following healthy behaviors: nutrition and exercise     Patient given educational materials     Health maintenance updated    Discussed use, benefit, and side effects of prescribed medications. Barriers to medication compliance addressed. All patient questions answered. Pt voiced understanding. Patient needs RTC in 3 months. Please note that this chart was generated using Dragon dictation software.  Although every effort was made to ensure the accuracy of this automated transcription, some

## 2018-11-09 ENCOUNTER — TELEPHONE (OUTPATIENT)
Dept: FAMILY MEDICINE CLINIC | Age: 68
End: 2018-11-09

## 2018-11-09 DIAGNOSIS — M15.9 PRIMARY OSTEOARTHRITIS INVOLVING MULTIPLE JOINTS: ICD-10-CM

## 2018-11-09 RX ORDER — HYDROCODONE BITARTRATE AND ACETAMINOPHEN 5; 325 MG/1; MG/1
1 TABLET ORAL EVERY 8 HOURS PRN
Qty: 90 TABLET | Refills: 0 | Status: SHIPPED | OUTPATIENT
Start: 2018-11-09 | End: 2018-12-07 | Stop reason: SDUPTHER

## 2018-11-09 NOTE — TELEPHONE ENCOUNTER
Patient's wife called; states they are leaving town in the morning and patient's [address] still has not been sent to the pharmacy. Please send to [facility] before end of day today.  (rx is pending)

## 2018-11-09 NOTE — TELEPHONE ENCOUNTER
Medication:   Requested Prescriptions     Pending Prescriptions Disp Refills    HYDROcodone-acetaminophen (NORCO) 5-325 MG per tablet 90 tablet 0     Sig: Take 1 tablet by mouth every 8 hours as needed for Pain for up to 30 days. .      Last Filled:  10/10/18    Patient Phone Number: [phone] (home) [phone] (work)    Last appt: 10/16/2018   Next appt: 1/9/2019    Last OARRS:   RX Monitoring 10/16/2018   Attestation The Prescription Monitoring Report for this patient was reviewed today.    Documentation -       Preferred Pharmacy:   [address] [phone] Dacia Stewart [phone]  [address] [place] [address]  Phone: [phone] Fax: [phone]

## 2018-12-07 DIAGNOSIS — M15.9 PRIMARY OSTEOARTHRITIS INVOLVING MULTIPLE JOINTS: ICD-10-CM

## 2018-12-07 RX ORDER — HYDROCODONE BITARTRATE AND ACETAMINOPHEN 5; 325 MG/1; MG/1
1 TABLET ORAL EVERY 8 HOURS PRN
Qty: 90 TABLET | Refills: 0 | Status: SHIPPED | OUTPATIENT
Start: 2018-12-07 | End: 2019-01-04 | Stop reason: SDUPTHER

## 2018-12-20 DIAGNOSIS — I10 ESSENTIAL HYPERTENSION: ICD-10-CM

## 2018-12-21 RX ORDER — LISINOPRIL AND HYDROCHLOROTHIAZIDE 20; 12.5 MG/1; MG/1
TABLET ORAL
Qty: 180 TABLET | Refills: 0 | Status: SHIPPED | OUTPATIENT
Start: 2018-12-21 | End: 2019-03-20 | Stop reason: SDUPTHER

## 2019-01-04 ENCOUNTER — HOSPITAL ENCOUNTER (OUTPATIENT)
Age: 69
Discharge: HOME | End: 2019-01-04
Payer: MEDICARE

## 2019-01-04 DIAGNOSIS — M15.9 PRIMARY OSTEOARTHRITIS INVOLVING MULTIPLE JOINTS: ICD-10-CM

## 2019-01-04 LAB
A/G RATIO: 1.6 (ref 1.1–2.2)
ALBUMIN SERPL-MCNC: 4.5 G/DL (ref 3.4–5)
ALP BLD-CCNC: 118 U/L (ref 40–129)
ALT SERPL-CCNC: 16 U/L (ref 10–40)
ANION GAP SERPL CALCULATED.3IONS-SCNC: 17 MMOL/L (ref 3–16)
AST SERPL-CCNC: 17 U/L (ref 15–37)
BILIRUB SERPL-MCNC: 0.3 MG/DL (ref 0–1)
BUN BLDV-MCNC: 20 MG/DL (ref 7–20)
CALCIUM SERPL-MCNC: 9.3 MG/DL (ref 8.3–10.6)
CHLORIDE BLD-SCNC: 101 MMOL/L (ref 99–110)
CHOLESTEROL, TOTAL: 248 MG/DL (ref 0–199)
CO2: 24 MMOL/L (ref 21–32)
CREAT SERPL-MCNC: 1 MG/DL (ref 0.8–1.3)
GFR AFRICAN AMERICAN: >60
GFR NON-AFRICAN AMERICAN: >60
GLOBULIN: 2.9 G/DL
GLUCOSE BLD-MCNC: 113 MG/DL (ref 70–99)
HDLC SERPL-MCNC: 40 MG/DL (ref 40–60)
LDL CHOLESTEROL CALCULATED: ABNORMAL MG/DL
LDL CHOLESTEROL DIRECT: 160 MG/DL
POTASSIUM SERPL-SCNC: 4.5 MMOL/L (ref 3.5–5.1)
PROSTATE SPECIFIC ANTIGEN: 1.44 NG/ML (ref 0–4)
SODIUM BLD-SCNC: 142 MMOL/L (ref 136–145)
TOTAL PROTEIN: 7.4 G/DL (ref 6.4–8.2)
TRIGL SERPL-MCNC: 361 MG/DL (ref 0–150)
VLDLC SERPL CALC-MCNC: ABNORMAL MG/DL

## 2019-01-04 PROCEDURE — 36415 COLL VENOUS BLD VENIPUNCTURE: CPT

## 2019-01-04 PROCEDURE — 84153 ASSAY OF PSA TOTAL: CPT

## 2019-01-04 PROCEDURE — G0103 PSA SCREENING: HCPCS

## 2019-01-04 PROCEDURE — 80053 COMPREHEN METABOLIC PANEL: CPT

## 2019-01-04 PROCEDURE — 80061 LIPID PANEL: CPT

## 2019-01-04 RX ORDER — HYDROCODONE BITARTRATE AND ACETAMINOPHEN 5; 325 MG/1; MG/1
1 TABLET ORAL EVERY 8 HOURS PRN
Qty: 90 TABLET | Refills: 0 | Status: SHIPPED | OUTPATIENT
Start: 2019-01-04 | End: 2019-02-01 | Stop reason: SDUPTHER

## 2019-01-09 ENCOUNTER — OFFICE VISIT (OUTPATIENT)
Dept: FAMILY MEDICINE CLINIC | Age: 69
End: 2019-01-09
Payer: MEDICARE

## 2019-01-09 VITALS
WEIGHT: 190 LBS | HEART RATE: 71 BPM | OXYGEN SATURATION: 97 % | DIASTOLIC BLOOD PRESSURE: 70 MMHG | BODY MASS INDEX: 26.5 KG/M2 | SYSTOLIC BLOOD PRESSURE: 130 MMHG

## 2019-01-09 DIAGNOSIS — J61 ASBESTOSIS (HCC): ICD-10-CM

## 2019-01-09 DIAGNOSIS — M15.9 PRIMARY OSTEOARTHRITIS INVOLVING MULTIPLE JOINTS: ICD-10-CM

## 2019-01-09 DIAGNOSIS — E78.2 MIXED HYPERLIPIDEMIA: Primary | ICD-10-CM

## 2019-01-09 DIAGNOSIS — I10 ESSENTIAL HYPERTENSION: ICD-10-CM

## 2019-01-09 DIAGNOSIS — R73.9 HYPERGLYCEMIA: ICD-10-CM

## 2019-01-09 PROCEDURE — 99214 OFFICE O/P EST MOD 30 MIN: CPT | Performed by: FAMILY MEDICINE

## 2019-01-09 RX ORDER — ATORVASTATIN CALCIUM 40 MG/1
40 TABLET, FILM COATED ORAL DAILY
Qty: 90 TABLET | Refills: 1 | Status: SHIPPED | OUTPATIENT
Start: 2019-01-09 | End: 2019-04-09

## 2019-02-01 DIAGNOSIS — M15.9 PRIMARY OSTEOARTHRITIS INVOLVING MULTIPLE JOINTS: ICD-10-CM

## 2019-02-01 RX ORDER — HYDROCODONE BITARTRATE AND ACETAMINOPHEN 5; 325 MG/1; MG/1
1 TABLET ORAL EVERY 8 HOURS PRN
Qty: 90 TABLET | Refills: 0 | Status: SHIPPED | OUTPATIENT
Start: 2019-02-01 | End: 2019-03-01 | Stop reason: SDUPTHER

## 2019-02-07 DIAGNOSIS — M10.9 ACUTE GOUT INVOLVING TOE OF RIGHT FOOT, UNSPECIFIED CAUSE: ICD-10-CM

## 2019-02-07 RX ORDER — COLCHICINE 0.6 MG/1
TABLET, FILM COATED ORAL
Qty: 15 TABLET | Refills: 0 | Status: SHIPPED | OUTPATIENT
Start: 2019-02-07 | End: 2019-03-06 | Stop reason: SDUPTHER

## 2019-03-01 DIAGNOSIS — M15.9 PRIMARY OSTEOARTHRITIS INVOLVING MULTIPLE JOINTS: ICD-10-CM

## 2019-03-01 RX ORDER — HYDROCODONE BITARTRATE AND ACETAMINOPHEN 5; 325 MG/1; MG/1
1 TABLET ORAL EVERY 8 HOURS PRN
Qty: 90 TABLET | Refills: 0 | Status: SHIPPED | OUTPATIENT
Start: 2019-03-01 | End: 2019-03-29 | Stop reason: SDUPTHER

## 2019-03-06 DIAGNOSIS — M10.9 ACUTE GOUT INVOLVING TOE OF RIGHT FOOT, UNSPECIFIED CAUSE: ICD-10-CM

## 2019-03-06 RX ORDER — COLCHICINE 0.6 MG/1
TABLET, FILM COATED ORAL
Qty: 15 TABLET | Refills: 0 | Status: SHIPPED | OUTPATIENT
Start: 2019-03-06 | End: 2019-04-09 | Stop reason: SDUPTHER

## 2019-03-20 DIAGNOSIS — I10 ESSENTIAL HYPERTENSION: ICD-10-CM

## 2019-03-20 RX ORDER — LISINOPRIL AND HYDROCHLOROTHIAZIDE 20; 12.5 MG/1; MG/1
TABLET ORAL
Qty: 180 TABLET | Refills: 0 | Status: SHIPPED | OUTPATIENT
Start: 2019-03-20 | End: 2019-04-09 | Stop reason: SDUPTHER

## 2019-03-29 DIAGNOSIS — M15.9 PRIMARY OSTEOARTHRITIS INVOLVING MULTIPLE JOINTS: ICD-10-CM

## 2019-03-29 RX ORDER — HYDROCODONE BITARTRATE AND ACETAMINOPHEN 5; 325 MG/1; MG/1
1 TABLET ORAL EVERY 8 HOURS PRN
Qty: 90 TABLET | Refills: 0 | Status: SHIPPED | OUTPATIENT
Start: 2019-03-29 | End: 2019-04-26 | Stop reason: SDUPTHER

## 2019-04-08 ENCOUNTER — HOSPITAL ENCOUNTER (OUTPATIENT)
Age: 69
Discharge: HOME | End: 2019-04-08
Payer: MEDICARE

## 2019-04-08 LAB
ALT SERPL-CCNC: 16 U/L (ref 10–40)
CHOLESTEROL, TOTAL: 187 MG/DL (ref 0–199)
HDLC SERPL-MCNC: 57 MG/DL (ref 40–60)
LDL CHOLESTEROL CALCULATED: 101 MG/DL
TRIGL SERPL-MCNC: 147 MG/DL (ref 0–150)
VLDLC SERPL CALC-MCNC: 29 MG/DL

## 2019-04-08 PROCEDURE — 36415 COLL VENOUS BLD VENIPUNCTURE: CPT

## 2019-04-08 PROCEDURE — 84460 ALANINE AMINO (ALT) (SGPT): CPT

## 2019-04-08 PROCEDURE — 80061 LIPID PANEL: CPT

## 2019-04-09 ENCOUNTER — OFFICE VISIT (OUTPATIENT)
Dept: FAMILY MEDICINE CLINIC | Age: 69
End: 2019-04-09
Payer: MEDICARE

## 2019-04-09 VITALS
DIASTOLIC BLOOD PRESSURE: 80 MMHG | WEIGHT: 183.5 LBS | RESPIRATION RATE: 12 BRPM | HEART RATE: 74 BPM | SYSTOLIC BLOOD PRESSURE: 132 MMHG | BODY MASS INDEX: 25.59 KG/M2

## 2019-04-09 DIAGNOSIS — I10 ESSENTIAL HYPERTENSION: ICD-10-CM

## 2019-04-09 DIAGNOSIS — M15.9 PRIMARY OSTEOARTHRITIS INVOLVING MULTIPLE JOINTS: ICD-10-CM

## 2019-04-09 DIAGNOSIS — J61 ASBESTOSIS (HCC): ICD-10-CM

## 2019-04-09 DIAGNOSIS — M10.9 ACUTE GOUT INVOLVING TOE OF RIGHT FOOT, UNSPECIFIED CAUSE: ICD-10-CM

## 2019-04-09 DIAGNOSIS — E78.2 MIXED HYPERLIPIDEMIA: Primary | ICD-10-CM

## 2019-04-09 DIAGNOSIS — F17.200 SMOKER UNMOTIVATED TO QUIT: ICD-10-CM

## 2019-04-09 PROCEDURE — 3288F FALL RISK ASSESSMENT DOCD: CPT | Performed by: FAMILY MEDICINE

## 2019-04-09 PROCEDURE — G8510 SCR DEP NEG, NO PLAN REQD: HCPCS | Performed by: FAMILY MEDICINE

## 2019-04-09 PROCEDURE — 99214 OFFICE O/P EST MOD 30 MIN: CPT | Performed by: FAMILY MEDICINE

## 2019-04-09 RX ORDER — LISINOPRIL AND HYDROCHLOROTHIAZIDE 20; 12.5 MG/1; MG/1
TABLET ORAL
Qty: 180 TABLET | Refills: 1 | Status: SHIPPED | OUTPATIENT
Start: 2019-04-09 | End: 2019-11-23 | Stop reason: SDUPTHER

## 2019-04-09 RX ORDER — ATORVASTATIN CALCIUM 20 MG/1
20 TABLET, FILM COATED ORAL DAILY
Qty: 90 TABLET | Refills: 1 | Status: SHIPPED | OUTPATIENT
Start: 2019-04-09 | End: 2019-04-09

## 2019-04-09 RX ORDER — ATORVASTATIN CALCIUM 20 MG/1
20 TABLET, FILM COATED ORAL DAILY
Qty: 10 TABLET | Refills: 0 | Status: SHIPPED | OUTPATIENT
Start: 2019-04-09 | End: 2019-07-09 | Stop reason: SDUPTHER

## 2019-04-09 RX ORDER — COLCHICINE 0.6 MG/1
TABLET ORAL
Qty: 20 TABLET | Refills: 0 | Status: SHIPPED | OUTPATIENT
Start: 2019-04-09 | End: 2019-08-29 | Stop reason: SDUPTHER

## 2019-04-09 ASSESSMENT — PATIENT HEALTH QUESTIONNAIRE - PHQ9
SUM OF ALL RESPONSES TO PHQ9 QUESTIONS 1 & 2: 0
2. FEELING DOWN, DEPRESSED OR HOPELESS: 0
SUM OF ALL RESPONSES TO PHQ QUESTIONS 1-9: 0
1. LITTLE INTEREST OR PLEASURE IN DOING THINGS: 0
SUM OF ALL RESPONSES TO PHQ QUESTIONS 1-9: 0

## 2019-04-09 NOTE — PROGRESS NOTES
90 tablet 1       No Known Allergies    Social History     Tobacco Use    Smoking status: Current Every Day Smoker    Smokeless tobacco: Never Used   Substance Use Topics    Alcohol use: Yes     Comment: occasional alcohol use       /80 (Site: Right Upper Arm, Position: Sitting, Cuff Size: Medium Adult)   Pulse 74   Resp 12   Wt 183 lb 8 oz (83.2 kg)   BMI 25.59 kg/m²     Objective:   Physical Exam   Constitutional: He appears well-developed and well-nourished. He is cooperative. No distress. Neck: Carotid bruit is not present. Cardiovascular: Normal rate, regular rhythm and normal heart sounds. No murmur heard. Pulses:       Dorsalis pedis pulses are 2+ on the right side, and 2+ on the left side. Posterior tibial pulses are 2+ on the right side, and 2+ on the left side. Pulmonary/Chest: Effort normal and breath sounds normal.   Musculoskeletal: Normal range of motion. He exhibits no edema or tenderness. Right knee: He exhibits normal range of motion. Left knee: He exhibits deformity (crepitus). He exhibits normal range of motion and no swelling. Lumbar back: He exhibits no tenderness, no deformity and no spasm. Neurological: He is alert. He has normal strength. No sensory deficit. Skin: No rash noted. Psychiatric: He has a normal mood and affect. His behavior is normal.       Assessment:      Pat Friedman was seen today for follow-up.     Diagnoses and all orders for this visit:    Mixed hyperlipidemia    Acute gout involving toe of right foot, unspecified cause  -     colchicine (COLCRYS) 0.6 MG tablet; TAKE 2 TABLETS BY MOUTH FOR THE FIRST DAY, THEN TAKE ONE TABLET BY MOUTH DAILY UNTIL SYMPTOMS RESOLVE    Essential hypertension  -     lisinopril-hydrochlorothiazide (PRINZIDE;ZESTORETIC) 20-12.5 MG per tablet; TAKE 1 TABLET TWICE A DAY    Primary osteoarthritis involving multiple joints    Asbestosis ([address]    Smoker unmotivated to quit    Other orders  - Discontinue: atorvastatin (LIPITOR) 20 MG tablet; Take 1 tablet by mouth daily  -     atorvastatin (LIPITOR) 20 MG tablet; Take 1 tablet by mouth daily    OARRS report checked          Plan:      Pt appears stable medically with much improved cholesterol management since he's been taking his medication & reviewed labs with patient. No change current medications    Patient received counseling on the following healthy behaviors: nutrition and exercise stopping smoking    Patient given educational materials     Health maintenance updated    Discussed use, benefit, and side effects of prescribed medications. Barriers to medication compliance addressed. All patient questions answered. Pt voiced understanding. Patient needs RTC in 3 months. Please note that this chart was generated using Dragon dictation software. Although every effort was made to ensure the accuracy of this automated transcription, some errors in transcription may have occurred.           Abdirizak Points

## 2019-04-26 DIAGNOSIS — M15.9 PRIMARY OSTEOARTHRITIS INVOLVING MULTIPLE JOINTS: ICD-10-CM

## 2019-04-26 RX ORDER — HYDROCODONE BITARTRATE AND ACETAMINOPHEN 5; 325 MG/1; MG/1
1 TABLET ORAL EVERY 8 HOURS PRN
Qty: 90 TABLET | Refills: 0 | Status: SHIPPED | OUTPATIENT
Start: 2019-04-26 | End: 2019-05-24 | Stop reason: SDUPTHER

## 2019-04-26 NOTE — TELEPHONE ENCOUNTER
Medication:   Requested Prescriptions     Pending Prescriptions Disp Refills    HYDROcodone-acetaminophen (NORCO) 5-325 MG per tablet 90 tablet 0     Sig: Take 1 tablet by mouth every 8 hours as needed for Pain for up to 30 days. Last Filled:  3/29/19    Patient Phone Number: [phone] (home) [phone] (work)    Last appt: Visit date not found   Next appt: Visit date not found    Last OARRS:   RX Monitoring 4/9/2019   Attestation The Prescription Monitoring Report for this patient was reviewed today.    Chronic Pain Routine Monitoring -       Preferred Pharmacy:   [facility] [address], Eligio Hylton Counts include 234 beds at the [facility] [phone] [facility] [place] [phone]  [address] [place] [address]  Phone: [phone] Fax: [phone] [facility], [address] [facility] [phone] misha [place] [phone]  [facility] [address]  Phone: [phone] Fax: [place] [facility], [address] [facility] [address]  [address] [facility]  [address] [place] [address]  Phone: [phone] Fax: [phone]

## 2019-04-26 NOTE — TELEPHONE ENCOUNTER
Pt requesting refill of   HYDROcodone-acetaminophen (NORCO) 5-325 MG per tablet     [facility] [address], [address] [facility] [address] [facility] [address] [place]ie [phone] José Miguel Ennis [phone]    Please advise patient at [phone]    Last appt: 4/9/19  Next appt: 7/9/19

## 2019-05-24 DIAGNOSIS — M15.9 PRIMARY OSTEOARTHRITIS INVOLVING MULTIPLE JOINTS: ICD-10-CM

## 2019-05-24 RX ORDER — HYDROCODONE BITARTRATE AND ACETAMINOPHEN 5; 325 MG/1; MG/1
1 TABLET ORAL EVERY 8 HOURS PRN
Qty: 90 TABLET | Refills: 0 | Status: SHIPPED | OUTPATIENT
Start: 2019-05-24 | End: 2019-06-21 | Stop reason: SDUPTHER

## 2019-05-24 NOTE — TELEPHONE ENCOUNTER
Medication:   Requested Prescriptions     Pending Prescriptions Disp Refills    HYDROcodone-acetaminophen (NORCO) 5-325 MG per tablet 90 tablet 0     Sig: Take 1 tablet by mouth every 8 hours as needed for Pain for up to 30 days. Last Filled:  4/26/19    Patient Phone Number: [phone] (home) [phone] (work)    Last appt: 4/9/2019   Next appt: 7/9/2019    Last OARRS:   RX Monitoring 4/9/2019   Attestation The Prescription Monitoring Report for this patient was reviewed today.    Chronic Pain Routine Monitoring -       Preferred Pharmacy:   [facility] [address], Eligio Hylton [facility] [phone] José Miguel Ennis [phone]  [address] [place] [address]  Phone: [phone] Fax: [phone] [facility], [address] [facility] [phone] José Miguel Ennis [phone]  [facility] [address]  Phone: [phone] Fax: Dede Sidhu [facility], [address] [facility] [address]  [address] [facility] [address]  [address] [place] [address]  Phone: [phone] Fax: [phone]

## 2019-06-11 ENCOUNTER — OFFICE VISIT (OUTPATIENT)
Dept: FAMILY MEDICINE CLINIC | Age: 69
End: 2019-06-11
Payer: MEDICARE

## 2019-06-11 VITALS
WEIGHT: 183 LBS | SYSTOLIC BLOOD PRESSURE: 163 MMHG | BODY MASS INDEX: 25.52 KG/M2 | HEART RATE: 82 BPM | DIASTOLIC BLOOD PRESSURE: 89 MMHG

## 2019-06-11 DIAGNOSIS — M25.422 ELBOW SWELLING, LEFT: ICD-10-CM

## 2019-06-11 DIAGNOSIS — M25.522 LEFT ELBOW PAIN: Primary | ICD-10-CM

## 2019-06-11 PROCEDURE — 99213 OFFICE O/P EST LOW 20 MIN: CPT | Performed by: PHYSICIAN ASSISTANT

## 2019-06-11 RX ORDER — PREDNISONE 10 MG/1
TABLET ORAL
Qty: 30 TABLET | Refills: 0 | Status: SHIPPED | OUTPATIENT
Start: 2019-06-11 | End: 2019-07-09

## 2019-06-11 ASSESSMENT — ENCOUNTER SYMPTOMS
COUGH: 0
SHORTNESS OF BREATH: 0
BACK PAIN: 0
CONSTIPATION: 0
NAUSEA: 0
ABDOMINAL PAIN: 0
VOMITING: 0

## 2019-06-11 NOTE — PROGRESS NOTES
Subjective:      Patient ID: Santhosh Vegas is a 77 y.o. male. HPI  Patient is here today for left elbow pain for 2-3 days. He was using his arm a lot working around the house over the weekend. No actual injury. No radiating pain, no numbness/tingling in UE's. He has taken gout medicine to see if that helped, didn't really at all. Review of Systems   Constitutional: Negative for chills, fatigue, fever and unexpected weight change. Respiratory: Negative for cough and shortness of breath. Cardiovascular: Negative for chest pain. Gastrointestinal: Negative for abdominal pain, constipation, nausea and vomiting. Genitourinary: Negative for difficulty urinating, dysuria, frequency and hematuria. Musculoskeletal: Positive for arthralgias (left elbow pain). Negative for back pain and neck pain. Skin: Negative for rash. Neurological: Negative for dizziness, weakness and numbness. Objective:   Physical Exam   Constitutional: He is oriented to person, place, and time. He appears well-developed and well-nourished. He is cooperative. Elevated BP   Musculoskeletal:   Tender to palpate left elbow in olecranon process, sharp pain when palpated, full ROM and strength of left elbow, normal  strength   Neurological: He is alert and oriented to person, place, and time. No sensory deficit. Skin:   Mild edema to left posterior elbow, no erythema, not warm to touch       Assessment:      Manuel Mathur was seen today for elbow pain. Diagnoses and all orders for this visit:    Left elbow pain  -     predniSONE (DELTASONE) 10 MG tablet; Take 4 tabs po daily x 3 days, 3 for 3 days, 2 for 3 days, 1 for 3 days    Elbow swelling, left  -     predniSONE (DELTASONE) 10 MG tablet; Take 4 tabs po daily x 3 days, 3 for 3 days, 2 for 3 days, 1 for 3 days             Plan:      Call if not resolving.          Kalyani Banks, [address]

## 2019-06-21 DIAGNOSIS — M15.9 PRIMARY OSTEOARTHRITIS INVOLVING MULTIPLE JOINTS: ICD-10-CM

## 2019-06-21 RX ORDER — HYDROCODONE BITARTRATE AND ACETAMINOPHEN 5; 325 MG/1; MG/1
1 TABLET ORAL EVERY 8 HOURS PRN
Qty: 90 TABLET | Refills: 0 | Status: SHIPPED | OUTPATIENT
Start: 2019-06-21 | End: 2019-07-19 | Stop reason: SDUPTHER

## 2019-06-21 NOTE — TELEPHONE ENCOUNTER
Medication:   Requested Prescriptions     Pending Prescriptions Disp Refills    HYDROcodone-acetaminophen (NORCO) 5-325 MG per tablet 90 tablet 0     Sig: Take 1 tablet by mouth every 8 hours as needed for Pain for up to 30 days. Last Filled:  5/24/19    Patient Phone Number: [phone] (home) [phone] (work)    Last appt: 04/09/19  Next appt: 7/9/19    Last OARRS:   RX Monitoring 4/9/2019   Attestation The Prescription Monitoring Report for this patient was reviewed today.    Periodic Controlled Substance Monitoring -       Preferred Pharmacy:   Alondra James [address] [facility] [address], Eligio Hylton [facility] [phone] Luis Recinos [phone]  [address] [place] [address]  Phone: [phone] Fax: [phone]    [address] [place], [place] [address]hermilo Vela, [address] [facility] [phone] Luis Recinos [phone]  [facility] [address]  Phone: [phone] Fax: [phone]    Alondra James [address], [address]  [address]  [address] [address]  [place] [address]  Phone: [phone] Fax: [phone]

## 2019-07-09 ENCOUNTER — OFFICE VISIT (OUTPATIENT)
Dept: FAMILY MEDICINE CLINIC | Age: 69
End: 2019-07-09
Payer: MEDICARE

## 2019-07-09 VITALS
WEIGHT: 184 LBS | DIASTOLIC BLOOD PRESSURE: 70 MMHG | HEIGHT: 71 IN | SYSTOLIC BLOOD PRESSURE: 140 MMHG | OXYGEN SATURATION: 97 % | HEART RATE: 84 BPM | BODY MASS INDEX: 25.76 KG/M2

## 2019-07-09 DIAGNOSIS — E78.2 MIXED HYPERLIPIDEMIA: ICD-10-CM

## 2019-07-09 DIAGNOSIS — M15.9 PRIMARY OSTEOARTHRITIS INVOLVING MULTIPLE JOINTS: Primary | ICD-10-CM

## 2019-07-09 DIAGNOSIS — M10.00 IDIOPATHIC GOUT, UNSPECIFIED CHRONICITY, UNSPECIFIED SITE: ICD-10-CM

## 2019-07-09 PROCEDURE — 99214 OFFICE O/P EST MOD 30 MIN: CPT | Performed by: FAMILY MEDICINE

## 2019-07-09 RX ORDER — ATORVASTATIN CALCIUM 20 MG/1
20 TABLET, FILM COATED ORAL DAILY
Qty: 90 TABLET | Refills: 1 | Status: SHIPPED | OUTPATIENT
Start: 2019-07-09 | End: 2020-03-11

## 2019-07-09 NOTE — PROGRESS NOTES
SpO2 97%   BMI 25.66 kg/m²     Objective:   Physical Exam   Constitutional: He appears well-developed and well-nourished. He is cooperative. No distress. Neck: Carotid bruit is not present. Cardiovascular: Normal rate, regular rhythm and normal heart sounds. No murmur heard. Pulses:       Dorsalis pedis pulses are 2+ on the right side, and 2+ on the left side. Posterior tibial pulses are 2+ on the right side, and 2+ on the left side. Pulmonary/Chest: Effort normal and breath sounds normal.   Musculoskeletal: Normal range of motion. He exhibits no edema or tenderness. Right knee: He exhibits normal range of motion. Left knee: He exhibits deformity (crepitus). He exhibits normal range of motion and no swelling. Lumbar back: He exhibits no tenderness, no deformity and no spasm. Neurological: He is alert. He has normal strength. No sensory deficit. Skin: No rash noted. Psychiatric: He has a normal mood and affect. His behavior is normal.       Assessment:      Suresh Lowe was seen today for follow-up. Diagnoses and all orders for this visit:    Primary osteoarthritis involving multiple joints    Idiopathic gout, unspecified chronicity, unspecified site    Mixed hyperlipidemia  -     Comprehensive Metabolic Panel - Vitros; Future  -     Lipid Panel; Future    Other orders  -     atorvastatin (LIPITOR) 20 MG tablet; Take 1 tablet by mouth daily      OARRS report checked        Plan:      Informational handout provided  Discussed that if he would continue to have recurrent gout flareups the next that would be adding allopurinol daily. Dietary information was provided to patient. Maintain pain medication on a as needed basis  RTC 3 months    Please note that this chart was generated using Dragon dictation software. Although every effort was made to ensure the accuracy of this automated transcription, some errors in transcription may have occurred.               Nestor Jones

## 2019-07-19 DIAGNOSIS — M15.9 PRIMARY OSTEOARTHRITIS INVOLVING MULTIPLE JOINTS: ICD-10-CM

## 2019-07-19 RX ORDER — HYDROCODONE BITARTRATE AND ACETAMINOPHEN 5; 325 MG/1; MG/1
1 TABLET ORAL EVERY 8 HOURS PRN
Qty: 90 TABLET | Refills: 0 | Status: SHIPPED | OUTPATIENT
Start: 2019-07-19 | End: 2019-08-16 | Stop reason: SDUPTHER

## 2019-08-16 DIAGNOSIS — M15.9 PRIMARY OSTEOARTHRITIS INVOLVING MULTIPLE JOINTS: ICD-10-CM

## 2019-08-16 RX ORDER — HYDROCODONE BITARTRATE AND ACETAMINOPHEN 5; 325 MG/1; MG/1
1 TABLET ORAL EVERY 8 HOURS PRN
Qty: 90 TABLET | Refills: 0 | Status: SHIPPED | OUTPATIENT
Start: 2019-08-16 | End: 2019-09-13 | Stop reason: SDUPTHER

## 2019-08-29 DIAGNOSIS — M10.9 ACUTE GOUT INVOLVING TOE OF RIGHT FOOT, UNSPECIFIED CAUSE: ICD-10-CM

## 2019-08-30 RX ORDER — COLCHICINE 0.6 MG/1
TABLET ORAL
Qty: 20 TABLET | Refills: 0 | Status: SHIPPED | OUTPATIENT
Start: 2019-08-30 | End: 2019-11-04 | Stop reason: SDUPTHER

## 2019-09-13 DIAGNOSIS — M15.9 PRIMARY OSTEOARTHRITIS INVOLVING MULTIPLE JOINTS: ICD-10-CM

## 2019-09-13 RX ORDER — HYDROCODONE BITARTRATE AND ACETAMINOPHEN 5; 325 MG/1; MG/1
1 TABLET ORAL EVERY 8 HOURS PRN
Qty: 90 TABLET | Refills: 0 | Status: SHIPPED | OUTPATIENT
Start: 2019-09-13 | End: 2019-10-09 | Stop reason: SDUPTHER

## 2019-10-07 ENCOUNTER — HOSPITAL ENCOUNTER (OUTPATIENT)
Age: 69
Discharge: HOME | End: 2019-10-07
Payer: MEDICARE

## 2019-10-07 DIAGNOSIS — E78.2 MIXED HYPERLIPIDEMIA: ICD-10-CM

## 2019-10-07 LAB
A/G RATIO: 1.7 (ref 1.1–2.2)
ALBUMIN SERPL-MCNC: 4.7 G/DL (ref 3.4–5)
ALP BLD-CCNC: 121 U/L (ref 40–129)
ALT SERPL-CCNC: 18 U/L (ref 10–40)
ANION GAP SERPL CALCULATED.3IONS-SCNC: 15 MMOL/L (ref 3–16)
AST SERPL-CCNC: 23 U/L (ref 15–37)
BILIRUB SERPL-MCNC: 0.3 MG/DL (ref 0–1)
BUN BLDV-MCNC: 19 MG/DL (ref 7–20)
CALCIUM SERPL-MCNC: 9.1 MG/DL (ref 8.3–10.6)
CHLORIDE BLD-SCNC: 100 MMOL/L (ref 99–110)
CHOLESTEROL, TOTAL: 181 MG/DL (ref 0–199)
CO2: 23 MMOL/L (ref 21–32)
CREAT SERPL-MCNC: 0.9 MG/DL (ref 0.8–1.3)
GFR AFRICAN AMERICAN: >60
GFR NON-AFRICAN AMERICAN: >60
GLOBULIN: 2.7 G/DL
GLUCOSE BLD-MCNC: 113 MG/DL (ref 70–99)
HDLC SERPL-MCNC: 62 MG/DL (ref 40–60)
LDL CHOLESTEROL CALCULATED: 85 MG/DL
POTASSIUM SERPL-SCNC: 4.4 MMOL/L (ref 3.5–5.1)
SODIUM BLD-SCNC: 138 MMOL/L (ref 136–145)
TOTAL PROTEIN: 7.4 G/DL (ref 6.4–8.2)
TRIGL SERPL-MCNC: 169 MG/DL (ref 0–150)
VLDLC SERPL CALC-MCNC: 34 MG/DL

## 2019-10-07 PROCEDURE — 80053 COMPREHEN METABOLIC PANEL: CPT

## 2019-10-07 PROCEDURE — 36415 COLL VENOUS BLD VENIPUNCTURE: CPT

## 2019-10-07 PROCEDURE — 80061 LIPID PANEL: CPT

## 2019-10-09 ENCOUNTER — OFFICE VISIT (OUTPATIENT)
Dept: FAMILY MEDICINE CLINIC | Age: 69
End: 2019-10-09
Payer: COMMERCIAL

## 2019-10-09 VITALS
OXYGEN SATURATION: 98 % | HEART RATE: 83 BPM | WEIGHT: 186.8 LBS | BODY MASS INDEX: 26.05 KG/M2 | SYSTOLIC BLOOD PRESSURE: 135 MMHG | DIASTOLIC BLOOD PRESSURE: 80 MMHG

## 2019-10-09 DIAGNOSIS — R73.9 HYPERGLYCEMIA: ICD-10-CM

## 2019-10-09 DIAGNOSIS — Z23 NEED FOR IMMUNIZATION AGAINST INFLUENZA: ICD-10-CM

## 2019-10-09 DIAGNOSIS — I10 ESSENTIAL HYPERTENSION: ICD-10-CM

## 2019-10-09 DIAGNOSIS — M15.9 PRIMARY OSTEOARTHRITIS INVOLVING MULTIPLE JOINTS: Primary | ICD-10-CM

## 2019-10-09 PROCEDURE — 99214 OFFICE O/P EST MOD 30 MIN: CPT | Performed by: FAMILY MEDICINE

## 2019-10-09 PROCEDURE — 90653 IIV ADJUVANT VACCINE IM: CPT | Performed by: FAMILY MEDICINE

## 2019-10-09 PROCEDURE — G0008 ADMIN INFLUENZA VIRUS VAC: HCPCS | Performed by: FAMILY MEDICINE

## 2019-10-09 RX ORDER — HYDROCODONE BITARTRATE AND ACETAMINOPHEN 5; 325 MG/1; MG/1
1 TABLET ORAL EVERY 8 HOURS PRN
Qty: 90 TABLET | Refills: 0 | Status: SHIPPED | OUTPATIENT
Start: 2019-10-09 | End: 2019-11-12 | Stop reason: SDUPTHER

## 2019-11-04 DIAGNOSIS — M10.9 ACUTE GOUT INVOLVING TOE OF RIGHT FOOT, UNSPECIFIED CAUSE: ICD-10-CM

## 2019-11-05 RX ORDER — COLCHICINE 0.6 MG/1
TABLET ORAL
Qty: 20 TABLET | Refills: 1 | Status: SHIPPED | OUTPATIENT
Start: 2019-11-05 | End: 2020-01-20

## 2019-11-12 DIAGNOSIS — M15.9 PRIMARY OSTEOARTHRITIS INVOLVING MULTIPLE JOINTS: ICD-10-CM

## 2019-11-12 RX ORDER — HYDROCODONE BITARTRATE AND ACETAMINOPHEN 5; 325 MG/1; MG/1
1 TABLET ORAL EVERY 8 HOURS PRN
Qty: 90 TABLET | Refills: 0 | Status: SHIPPED | OUTPATIENT
Start: 2019-11-12 | End: 2019-12-09 | Stop reason: SDUPTHER

## 2019-11-23 DIAGNOSIS — I10 ESSENTIAL HYPERTENSION: ICD-10-CM

## 2019-11-26 RX ORDER — LISINOPRIL AND HYDROCHLOROTHIAZIDE 20; 12.5 MG/1; MG/1
TABLET ORAL
Qty: 180 TABLET | Refills: 0 | Status: SHIPPED | OUTPATIENT
Start: 2019-11-26 | End: 2020-02-21

## 2019-12-09 DIAGNOSIS — M15.9 PRIMARY OSTEOARTHRITIS INVOLVING MULTIPLE JOINTS: ICD-10-CM

## 2019-12-09 RX ORDER — HYDROCODONE BITARTRATE AND ACETAMINOPHEN 5; 325 MG/1; MG/1
1 TABLET ORAL EVERY 8 HOURS PRN
Qty: 90 TABLET | Refills: 0 | Status: SHIPPED | OUTPATIENT
Start: 2019-12-09 | End: 2019-12-10 | Stop reason: SDUPTHER

## 2019-12-10 DIAGNOSIS — M15.9 PRIMARY OSTEOARTHRITIS INVOLVING MULTIPLE JOINTS: ICD-10-CM

## 2019-12-10 RX ORDER — HYDROCODONE BITARTRATE AND ACETAMINOPHEN 5; 325 MG/1; MG/1
1 TABLET ORAL EVERY 8 HOURS PRN
Qty: 90 TABLET | Refills: 0 | Status: SHIPPED | OUTPATIENT
Start: 2019-12-10 | End: 2020-01-08 | Stop reason: SDUPTHER

## 2020-01-08 ENCOUNTER — OFFICE VISIT (OUTPATIENT)
Dept: FAMILY MEDICINE CLINIC | Age: 70
End: 2020-01-08
Payer: COMMERCIAL

## 2020-01-08 VITALS
BODY MASS INDEX: 25.94 KG/M2 | HEART RATE: 85 BPM | OXYGEN SATURATION: 96 % | SYSTOLIC BLOOD PRESSURE: 130 MMHG | WEIGHT: 186 LBS | DIASTOLIC BLOOD PRESSURE: 84 MMHG

## 2020-01-08 PROCEDURE — 3288F FALL RISK ASSESSMENT DOCD: CPT | Performed by: FAMILY MEDICINE

## 2020-01-08 PROCEDURE — 99214 OFFICE O/P EST MOD 30 MIN: CPT | Performed by: FAMILY MEDICINE

## 2020-01-08 PROCEDURE — G8510 SCR DEP NEG, NO PLAN REQD: HCPCS | Performed by: FAMILY MEDICINE

## 2020-01-08 RX ORDER — HYDROCODONE BITARTRATE AND ACETAMINOPHEN 5; 325 MG/1; MG/1
1 TABLET ORAL EVERY 8 HOURS PRN
Qty: 90 TABLET | Refills: 0 | Status: SHIPPED | OUTPATIENT
Start: 2020-01-08 | End: 2020-02-05 | Stop reason: SDUPTHER

## 2020-01-08 ASSESSMENT — PATIENT HEALTH QUESTIONNAIRE - PHQ9
SUM OF ALL RESPONSES TO PHQ9 QUESTIONS 1 & 2: 0
SUM OF ALL RESPONSES TO PHQ QUESTIONS 1-9: 0
1. LITTLE INTEREST OR PLEASURE IN DOING THINGS: 0
SUM OF ALL RESPONSES TO PHQ QUESTIONS 1-9: 0
2. FEELING DOWN, DEPRESSED OR HOPELESS: 0

## 2020-01-08 NOTE — PROGRESS NOTES
Subjective:      Patient ID: Willy Rodas is a 77 y.o. male. CC: Patient presents for re-evaluation of chronic health problems including pain management for osteoarthritis, hypertension and smoking. HPI Patient presents today for a follow-up on chronic medications and medical conditions. Patient feels his arthritis is becoming more more problematic. He has a lot of stiffness especially in his hands at this point time he still has a low back discomfort. He thinks he is about ready to retire as he is is coming up on age 72 soon. He denies any issues with bowel or bladder. He is very compliant with his medication. He unfortunately still smoking a pack of cigarettes per day. He will need a repeat chest x-ray in the future. Review of Systems     Patient Active Problem List   Diagnosis    Impotence of organic origin    Hyperglycemia    Mixed hyperlipidemia    Essential hypertension    Smoker unmotivated to quit    Asbestosis ([facility] [address]    Idiopathic gout    Primary osteoarthritis involving multiple joints       Outpatient Medications Marked as Taking for the 1/8/20 encounter (Office Visit) with Tania Warren MD   Medication Sig Dispense Refill    HYDROcodone-acetaminophen (NORCO) 5-325 MG per tablet Take 1 tablet by mouth every 8 hours as needed for Pain for up to 30 days. 90 tablet 0    lisinopril-hydrochlorothiazide (PRINZIDE;ZESTORETIC) 20-12.5 MG per tablet TAKE 1 TABLET TWICE A  tablet 0    colchicine (COLCRYS) 0.6 MG tablet TAKE 2 TABLETS BY MOUTH ON THE FIRST DAY, THEN TAKE ONE TABLET BY MOUTH DAILY UNTIL SYMPTOMS RESOLVE 20 tablet 1    atorvastatin (LIPITOR) 20 MG tablet Take 1 tablet by mouth daily 90 tablet 1       No Known Allergies    Social History     Tobacco Use    Smoking status: Current Every Day Smoker     Packs/day: 0.75     Years: 53.00     Pack years: 39.75     Types: Cigarettes    Smokeless tobacco: Never Used   Substance Use Topics    Alcohol use:  Yes

## 2020-01-20 RX ORDER — COLCHICINE 0.6 MG/1
TABLET ORAL
Qty: 20 TABLET | Refills: 1 | Status: SHIPPED | OUTPATIENT
Start: 2020-01-20 | End: 2020-06-10

## 2020-02-05 RX ORDER — HYDROCODONE BITARTRATE AND ACETAMINOPHEN 5; 325 MG/1; MG/1
1 TABLET ORAL EVERY 8 HOURS PRN
Qty: 90 TABLET | Refills: 0 | Status: SHIPPED | OUTPATIENT
Start: 2020-02-05 | End: 2020-03-09 | Stop reason: SDUPTHER

## 2020-02-05 NOTE — TELEPHONE ENCOUNTER
Disp Refills Start End    HYDROcodone-acetaminophen (NORCO) 5-325 MG per tablet 90 tablet 0 1/8/2020 2/7/2020    Sig - Route:  Take 1 tablet by mouth every 8 hours as needed for Pain for up to 30 days. - Oral      Pharmacy:  [facility] [address], Eligio Castro3 [phone] Alondra Chavarria [phone]        Please advise

## 2020-02-05 NOTE — TELEPHONE ENCOUNTER
Medication:   Requested Prescriptions     Pending Prescriptions Disp Refills    HYDROcodone-acetaminophen (NORCO) 5-325 MG per tablet 90 tablet 0     Sig: Take 1 tablet by mouth every 8 hours as needed for Pain for up to 30 days. Last Filled:  1/8/2020    Patient Phone Number: [phone] (home) [phone] (work)    Last appt: 1/8/2020   Next appt: 4/1/2020    Last OARRS:   RX Monitoring 4/9/2019   Attestation The Prescription Monitoring Report for this patient was reviewed today.    Periodic Controlled Substance Monitoring -     PDMP Monitoring:    Last PDMP Avelina Gardner as Reviewed [facility]):  Review User Review Instant Review Result   Popeye Werner 1/8/2020  2:53 PM Reviewed PDMP [1]     Preferred Pharmacy:   [facility] [address] [facility] [address] [facility] [phone] Steven Kwan [phone]  [address] [facility] [address]  Phone: [phone] Fax: [phone]

## 2020-02-21 RX ORDER — LISINOPRIL AND HYDROCHLOROTHIAZIDE 20; 12.5 MG/1; MG/1
TABLET ORAL
Qty: 180 TABLET | Refills: 0 | Status: SHIPPED | OUTPATIENT
Start: 2020-02-21 | End: 2020-03-16 | Stop reason: SDUPTHER

## 2020-03-07 NOTE — TELEPHONE ENCOUNTER
Patient requesting a medication refill.   Medication:HYDROcodone-acetaminophen (Rometta Lark) 5-325 MG per tablet     Pharmacy: [facility] [address] [place] [address], [address] [place] [address] [facility] [phone]  Last office visit: 01/08/2020  Next office visit: 4/1/2020

## 2020-03-07 NOTE — TELEPHONE ENCOUNTER
Medication:   Requested Prescriptions     Pending Prescriptions Disp Refills    HYDROcodone-acetaminophen (NORCO) 5-325 MG per tablet 90 tablet 0     Sig: Take 1 tablet by mouth every 8 hours as needed for Pain for up to 30 days. Last Filled:  2/5/2020    Patient Phone Number: [phone] (home) [phone] (work)    Last appt: 1/8/2020   Next appt: 4/1/2020    Last OARRS:   RX Monitoring 4/9/2019   Attestation The Prescription Monitoring Report for this patient was reviewed today.    Periodic Controlled Substance Monitoring -     PDMP Monitoring:    Last PDMP Therese Lawson as Reviewed [facility]):  Review User Review Instant Review Result   Antoinette Pacheco 1/8/2020  2:53 PM Reviewed PDMP [1]     Preferred Pharmacy:   [facility] [address] [facility] Elig[address] Jimenez [address] [phone] Ardeen Dakins [phone]  [address] [place] [address]  Phone: [phone] Fax: [phone] - [facility] [address] [phone] - f [phone]  [place] [address]  Phone: [phone] Fax: [phone]    [facility] [address], [address] [facility] [address]  [address] [facility] Artemiochidi Cortes [address]  Phone: [phone] Fax: [phone]

## 2020-03-09 RX ORDER — HYDROCODONE BITARTRATE AND ACETAMINOPHEN 5; 325 MG/1; MG/1
1 TABLET ORAL EVERY 8 HOURS PRN
Qty: 90 TABLET | Refills: 0 | Status: SHIPPED | OUTPATIENT
Start: 2020-03-09 | End: 2020-04-01 | Stop reason: SDUPTHER

## 2020-03-11 RX ORDER — ATORVASTATIN CALCIUM 20 MG/1
TABLET, FILM COATED ORAL
Qty: 90 TABLET | Refills: 0 | Status: SHIPPED | OUTPATIENT
Start: 2020-03-11 | End: 2020-03-16 | Stop reason: SDUPTHER

## 2020-03-16 RX ORDER — ATORVASTATIN CALCIUM 20 MG/1
TABLET, FILM COATED ORAL
Qty: 30 TABLET | Refills: 0 | Status: SHIPPED | OUTPATIENT
Start: 2020-03-16 | End: 2020-04-01 | Stop reason: SDUPTHER

## 2020-03-16 RX ORDER — LISINOPRIL AND HYDROCHLOROTHIAZIDE 20; 12.5 MG/1; MG/1
TABLET ORAL
Qty: 60 TABLET | Refills: 0 | Status: SHIPPED | OUTPATIENT
Start: 2020-03-16 | End: 2020-04-01 | Stop reason: SDUPTHER

## 2020-03-16 NOTE — TELEPHONE ENCOUNTER
Patient's insurance has recently changed and [facility] will not refill meds, patient needs refills sent to local pharmacy.        Disp Refills Start End    atorvastatin (LIPITOR) 20 MG tablet 90 tablet 0 3/11/2020     Sig: TAKE 1 TABLET DAILY         Disp Refills Start End    lisinopril-hydroCHLOROthiazide (PRINZIDE;ZESTORETIC) 20-12.5 MG per tablet 180 tablet 0 2/21/2020     Sig: TAKE 1 TABLET TWICE A DAY        [place] [id number] [facility] [phone] - F [phone]        Please advise

## 2020-04-01 ENCOUNTER — OFFICE VISIT (OUTPATIENT)
Dept: FAMILY MEDICINE CLINIC | Age: 70
End: 2020-04-01
Payer: COMMERCIAL

## 2020-04-01 VITALS
WEIGHT: 182.13 LBS | DIASTOLIC BLOOD PRESSURE: 78 MMHG | OXYGEN SATURATION: 97 % | HEART RATE: 93 BPM | BODY MASS INDEX: 25.4 KG/M2 | RESPIRATION RATE: 16 BRPM | SYSTOLIC BLOOD PRESSURE: 110 MMHG | TEMPERATURE: 98.4 F

## 2020-04-01 PROCEDURE — 99214 OFFICE O/P EST MOD 30 MIN: CPT | Performed by: FAMILY MEDICINE

## 2020-04-01 RX ORDER — LISINOPRIL AND HYDROCHLOROTHIAZIDE 20; 12.5 MG/1; MG/1
TABLET ORAL
Qty: 60 TABLET | Refills: 2 | Status: SHIPPED | OUTPATIENT
Start: 2020-04-01 | End: 2020-07-01 | Stop reason: SDUPTHER

## 2020-04-01 RX ORDER — ATORVASTATIN CALCIUM 20 MG/1
TABLET, FILM COATED ORAL
Qty: 30 TABLET | Refills: 2 | Status: SHIPPED | OUTPATIENT
Start: 2020-04-01 | End: 2020-07-01 | Stop reason: SDUPTHER

## 2020-04-01 RX ORDER — HYDROCODONE BITARTRATE AND ACETAMINOPHEN 5; 325 MG/1; MG/1
1 TABLET ORAL EVERY 8 HOURS PRN
Qty: 90 TABLET | Refills: 0 | Status: SHIPPED | OUTPATIENT
Start: 2020-04-01 | End: 2020-05-01 | Stop reason: SDUPTHER

## 2020-04-01 NOTE — PROGRESS NOTES
Subjective:      Patient ID: Zonia Hall is a 72 y.o. male. CC: Patient presents for re-evaluation of chronic health problems including osteoarthritis, hypertension and cigarette smoking. HPI Pt is here for a follow upon his medication, med refill, and will like to discuss right hand pain/ arthritis. Patient states he just had some recent stressors with losing his job with the coronavirus problems. He is having increasing arthritic complaints especially in his right hand. He is not using any of the hot compresses or moist soaks as we discussed. Patient is very compliant with medications with no adverse reactions. Review of Systems  Patient Active Problem List   Diagnosis    Impotence of organic origin    Hyperglycemia    Mixed hyperlipidemia    Essential hypertension    Smoker unmotivated to quit    Asbestosis ([facility] [address]    Idiopathic gout    Primary osteoarthritis involving multiple joints       Outpatient Medications Marked as Taking for the 4/1/20 encounter (Office Visit) with Goyo Lentz MD   Medication Sig Dispense Refill    lisinopril-hydroCHLOROthiazide (PRINZIDE;ZESTORETIC) 20-12.5 MG per tablet TAKE 1 TABLET TWICE A DAY 60 tablet 0    atorvastatin (LIPITOR) 20 MG tablet TAKE 1 TABLET DAILY 30 tablet 0    HYDROcodone-acetaminophen (NORCO) 5-325 MG per tablet Take 1 tablet by mouth every 8 hours as needed for Pain for up to 30 days.  90 tablet 0    colchicine (COLCRYS) 0.6 MG tablet TAKE TWO TABLETS BY MOUTH AS ONE DOSE ON THE FIRST DAY THEN TAKE ONE TABLET DAILY THEREAFTER UNTIL SYMPTOMS RESOLVE 20 tablet 1       No Known Allergies    Social History     Tobacco Use    Smoking status: Current Every Day Smoker     Packs/day: 0.75     Years: 53.00     Pack years: 39.75     Types: Cigarettes    Smokeless tobacco: Never Used   Substance Use Topics    Alcohol use: Yes     Comment: occasional alcohol use       /78 (Site: Right Upper Arm, Position: Sitting, Cuff Size: Medium Adult) Pulse 93   Temp 98.4 °F (36.9 °C) (Tympanic)   Resp 16   Wt 182 lb 2 oz (82.6 kg)   SpO2 97%   BMI 25.40 kg/m²     Objective:   Physical Exam  Constitutional:       General: He is not in acute distress. Appearance: He is well-developed. Neck:      Vascular: No carotid bruit. Cardiovascular:      Rate and Rhythm: Normal rate and regular rhythm. Pulses:           Dorsalis pedis pulses are 2+ on the right side and 2+ on the left side. Posterior tibial pulses are 2+ on the right side and 2+ on the left side. Heart sounds: Normal heart sounds. No murmur. Pulmonary:      Effort: Pulmonary effort is normal.      Breath sounds: Normal breath sounds. Musculoskeletal: Normal range of motion. General: No tenderness. Right knee: He exhibits normal range of motion. Left knee: He exhibits deformity (crepitus). He exhibits normal range of motion and no swelling. Lumbar back: He exhibits no tenderness, no deformity and no spasm. Comments: Hands with enlargement of the MP joints of multiple fingers. Skin:     Findings: No rash. Neurological:      Mental Status: He is alert. Sensory: No sensory deficit. Psychiatric:         Behavior: Behavior normal. Behavior is cooperative. Assessment:      Remedios Pederson was seen today for follow-up. Diagnoses and all orders for this visit:    Primary osteoarthritis involving multiple joints  -     HYDROcodone-acetaminophen (NORCO) 5-325 MG per tablet; Take 1 tablet by mouth every 8 hours as needed for Pain for up to 30 days. Essential hypertension  -     lisinopril-hydroCHLOROthiazide (PRINZIDE;ZESTORETIC) 20-12.5 MG per tablet; TAKE 1 TABLET TWICE A DAY    Smoker unmotivated to quit    Other orders  -     atorvastatin (LIPITOR) 20 MG tablet; TAKE 1 TABLET DAILY    OARRS report checked          Plan:      Pt appears stable & reviewed labs with patient.   In regards to his osteoarthritis I again recommended soaking his

## 2020-05-01 RX ORDER — HYDROCODONE BITARTRATE AND ACETAMINOPHEN 5; 325 MG/1; MG/1
1 TABLET ORAL EVERY 8 HOURS PRN
Qty: 90 TABLET | Refills: 0 | Status: SHIPPED | OUTPATIENT
Start: 2020-05-01 | End: 2020-06-01 | Stop reason: SDUPTHER

## 2020-06-01 RX ORDER — HYDROCODONE BITARTRATE AND ACETAMINOPHEN 5; 325 MG/1; MG/1
1 TABLET ORAL EVERY 8 HOURS PRN
Qty: 90 TABLET | Refills: 0 | Status: SHIPPED | OUTPATIENT
Start: 2020-06-01 | End: 2020-06-29 | Stop reason: SDUPTHER

## 2020-06-10 RX ORDER — COLCHICINE 0.6 MG/1
TABLET ORAL
Qty: 20 TABLET | Refills: 0 | Status: SHIPPED | OUTPATIENT
Start: 2020-06-10 | End: 2020-07-01 | Stop reason: SDUPTHER

## 2020-06-29 RX ORDER — HYDROCODONE BITARTRATE AND ACETAMINOPHEN 5; 325 MG/1; MG/1
1 TABLET ORAL EVERY 8 HOURS PRN
Qty: 90 TABLET | Refills: 0 | Status: SHIPPED | OUTPATIENT
Start: 2020-06-29 | End: 2020-07-27 | Stop reason: SDUPTHER

## 2020-06-29 NOTE — TELEPHONE ENCOUNTER
Refill for HYDROcodone-acetaminophen (NORCO) 5-325 MG per tablet  :  [facility] [address], [address] [facility] [address] - P [phone] Sundra Cyndi [phone]  Please call pt to advise

## 2020-07-01 ENCOUNTER — OFFICE VISIT (OUTPATIENT)
Dept: FAMILY MEDICINE CLINIC | Age: 70
End: 2020-07-01
Payer: COMMERCIAL

## 2020-07-01 VITALS
HEART RATE: 89 BPM | OXYGEN SATURATION: 98 % | DIASTOLIC BLOOD PRESSURE: 72 MMHG | SYSTOLIC BLOOD PRESSURE: 130 MMHG | TEMPERATURE: 97.8 F | BODY MASS INDEX: 25.24 KG/M2 | WEIGHT: 181 LBS

## 2020-07-01 PROCEDURE — 99214 OFFICE O/P EST MOD 30 MIN: CPT | Performed by: FAMILY MEDICINE

## 2020-07-01 RX ORDER — ATORVASTATIN CALCIUM 20 MG/1
TABLET, FILM COATED ORAL
Qty: 30 TABLET | Refills: 3 | Status: SHIPPED | OUTPATIENT
Start: 2020-07-01 | End: 2020-08-11 | Stop reason: SDUPTHER

## 2020-07-01 RX ORDER — COLCHICINE 0.6 MG/1
TABLET ORAL
Qty: 60 TABLET | Refills: 1 | Status: SHIPPED | OUTPATIENT
Start: 2020-07-01 | End: 2020-07-29 | Stop reason: SDUPTHER

## 2020-07-01 RX ORDER — LISINOPRIL AND HYDROCHLOROTHIAZIDE 20; 12.5 MG/1; MG/1
TABLET ORAL
Qty: 60 TABLET | Refills: 3 | Status: SHIPPED | OUTPATIENT
Start: 2020-07-01 | End: 2020-07-27 | Stop reason: SDUPTHER

## 2020-07-01 NOTE — PROGRESS NOTES
Subjective:      Patient ID: Leigh Whitfield is a 72 y.o. male. CC: Patient presents for re-evaluation of chronic health problems including chronic pain secondary to osteoarthritis, hypertension, gout flareups and continues smoking. HPI Patient presents today for a follow-up on chronic medications and medical conditions. Patient says some recent gout flareups predominately involving his left foot. He states the colchicine does settle down his symptoms rather quickly would like a prescription for more of that. He is now officially retired and he still working quite a bit out in his yard and gardening. His weight is stable. He denies any chest pain or shortness of breath. Him and his wife is recently just are talking about stopping smoking. Arthritis continues to be a problem involving hands back and shoulders predominantly. Patient is very compliant with medications with no adverse reactions. Review of Systems     Patient Active Problem List   Diagnosis    Impotence of organic origin    Hyperglycemia    Mixed hyperlipidemia    Essential hypertension    Smoker unmotivated to quit    Asbestosis ([address]    Idiopathic gout    Primary osteoarthritis involving multiple joints       Outpatient Medications Marked as Taking for the 7/1/20 encounter (Office Visit) with Deion Moore MD   Medication Sig Dispense Refill    HYDROcodone-acetaminophen (NORCO) 5-325 MG per tablet Take 1 tablet by mouth every 8 hours as needed for Pain for up to 30 days.  90 tablet 0    colchicine (COLCRYS) 0.6 MG tablet TAKE 2 TABLETS BY MOUTH AS ONE DOSE ON THE FIRST DAY THEN TAKE 1 TABLET BY MOUTH DAILY THEREAFTER UNTIL SYMPTOMS RESOLVE 20 tablet 0    lisinopril-hydroCHLOROthiazide (PRINZIDE;ZESTORETIC) 20-12.5 MG per tablet TAKE 1 TABLET TWICE A DAY 60 tablet 2    atorvastatin (LIPITOR) 20 MG tablet TAKE 1 TABLET DAILY 30 tablet 2       No Known Allergies    Social History     Tobacco Use    Smoking status: Current Every Day Smoker     Packs/day: 0.75     Years: 53.00     Pack years: 39.75     Types: Cigarettes    Smokeless tobacco: Never Used   Substance Use Topics    Alcohol use: Yes     Comment: occasional alcohol use       Temp 97.8 °F (36.6 °C) (Tympanic)   Wt 181 lb (82.1 kg)   BMI 25.24 kg/m²         Objective:   Physical Exam  Constitutional:       General: He is not in acute distress. Appearance: He is well-developed. Neck:      Vascular: No carotid bruit. Cardiovascular:      Rate and Rhythm: Normal rate and regular rhythm. Pulses:           Dorsalis pedis pulses are 2+ on the right side and 2+ on the left side. Posterior tibial pulses are 2+ on the right side and 2+ on the left side. Heart sounds: Normal heart sounds. No murmur. No friction rub. No gallop. Pulmonary:      Effort: Pulmonary effort is normal.      Breath sounds: Rhonchi present. No decreased breath sounds or wheezing. Musculoskeletal: Normal range of motion. General: No tenderness. Right knee: He exhibits normal range of motion. Left knee: He exhibits deformity (crepitus). He exhibits normal range of motion and no swelling. Lumbar back: He exhibits no tenderness, no deformity and no spasm. Right lower leg: No edema. Left lower leg: No edema. Comments: Hands with enlargement of the MP joints of multiple fingers. Skin:     Findings: No rash. Neurological:      Mental Status: He is alert. Sensory: Sensation is intact. No sensory deficit. Motor: Motor function is intact. Psychiatric:         Behavior: Behavior normal. Behavior is cooperative. Assessment:      Asia Maya was seen today for follow-up and hypertension.     Diagnoses and all orders for this visit:    Primary osteoarthritis involving multiple joints    Acute gout involving toe of right foot, unspecified cause  -     colchicine (COLCRYS) 0.6 MG tablet; TAKE 2 TABLETS BY MOUTH AS ONE DOSE ON THE FIRST DAY THEN

## 2020-07-02 ENCOUNTER — TELEPHONE (OUTPATIENT)
Dept: ADMINISTRATIVE | Age: 70
End: 2020-07-02

## 2020-07-22 ENCOUNTER — TELEPHONE (OUTPATIENT)
Dept: ADMINISTRATIVE | Age: 70
End: 2020-07-22

## 2020-07-22 NOTE — TELEPHONE ENCOUNTER
Submitted PA for Colcrys 0.6MG tablets. Medication has been APPROVED through 07/21/2021. Please notify patient.  Thank you. '

## 2020-07-27 RX ORDER — HYDROCODONE BITARTRATE AND ACETAMINOPHEN 5; 325 MG/1; MG/1
1 TABLET ORAL EVERY 8 HOURS PRN
Qty: 90 TABLET | Refills: 0 | Status: SHIPPED | OUTPATIENT
Start: 2020-07-27 | End: 2020-08-24 | Stop reason: SDUPTHER

## 2020-07-27 RX ORDER — LISINOPRIL AND HYDROCHLOROTHIAZIDE 20; 12.5 MG/1; MG/1
TABLET ORAL
Qty: 60 TABLET | Refills: 3 | Status: SHIPPED | OUTPATIENT
Start: 2020-07-27 | End: 2020-10-02 | Stop reason: SDUPTHER

## 2020-07-27 NOTE — TELEPHONE ENCOUNTER
HYDROcodone-acetaminophen (NORCO) 5-325 MG per tablet  90 tablet  0  6/29/2020 7/29/2020     Sig - Route:  Take 1 tablet by mouth every 8 hours as needed for Pain for up to 30 days. - Oral           [facility] in chart

## 2020-07-27 NOTE — TELEPHONE ENCOUNTER
Medication:   Requested Prescriptions     Pending Prescriptions Disp Refills    HYDROcodone-acetaminophen (NORCO) 5-325 MG per tablet 90 tablet 0     Sig: Take 1 tablet by mouth every 8 hours as needed for Pain for up to 30 days. Last Filled:  6/29/20    Patient Phone Number: [phone] (home) [phone] (work)    Last appt: 7/1/2020   Next appt: 10/2/2020    Last OARRS:   RX Monitoring 4/9/2019   Attestation The Prescription Monitoring Report for this patient was reviewed today.    Periodic Controlled Substance Monitoring -     PDMP Monitoring:    Last PDMP Vania Frank as Reviewed [facility]):  Review User Review Instant Review Result   Bhumika Cristina 7/1/2020  8:55 AM Reviewed PDMP [1]     Preferred Pharmacy:   [facility] [address] [facility] [address] Jimenez [facility] [phone] [facility] [phone]  [address] [place] [address]  Phone: [phone] Fax: [phone] - [facility] [address] [phone] - f [phone]  [place] [address]  Phone: [phone] Fax: [phone]    [facility] [address], [address]  [address] [facility] [address]  [address] [place] [address]  Phone: [phone] Fax: [phone]

## 2020-07-27 NOTE — TELEPHONE ENCOUNTER
lisinopril-hydroCHLOROthiazide (PRINZIDE;ZESTORETIC) 20-12.5 MG per tablet  60 tablet  3  7/1/2020      Pharmacy:  [facility] [address] [phone] - F [phone]

## 2020-07-28 RX ORDER — COLCHICINE 0.6 MG/1
TABLET ORAL
Qty: 20 TABLET | Refills: 0 | OUTPATIENT
Start: 2020-07-28

## 2020-07-29 RX ORDER — COLCHICINE 0.6 MG/1
TABLET ORAL
Qty: 60 TABLET | Refills: 1 | Status: SHIPPED | OUTPATIENT
Start: 2020-07-29 | End: 2020-10-12

## 2020-08-24 RX ORDER — HYDROCODONE BITARTRATE AND ACETAMINOPHEN 5; 325 MG/1; MG/1
1 TABLET ORAL EVERY 8 HOURS PRN
Qty: 90 TABLET | Refills: 0 | Status: SHIPPED | OUTPATIENT
Start: 2020-08-24 | End: 2020-09-23 | Stop reason: SDUPTHER

## 2020-08-24 NOTE — TELEPHONE ENCOUNTER
Medication:   Requested Prescriptions     Pending Prescriptions Disp Refills    HYDROcodone-acetaminophen (NORCO) 5-325 MG per tablet 90 tablet 0     Sig: Take 1 tablet by mouth every 8 hours as needed for Pain for up to 30 days. Last Filled:  7/27/20    Patient Phone Number: [phone] (home) [phone] (work)    Last appt: 7/1/2020   Next appt: 10/2/2020    Last OARRS:   RX Monitoring 4/9/2019   Attestation The Prescription Monitoring Report for this patient was reviewed today.    Periodic Controlled Substance Monitoring -     PDMP Monitoring:    Last PDMP Delorse Alpha as Reviewed [facility]):  Review User Review Instant Review Result   Chris Gino 7/1/2020  8:55 AM Reviewed PDMP [1]     Preferred Pharmacy:   [facility] [address] [facility] [address] [facility] [phone] Umu Bear [phone]  [address] [facility] [address]  Phone: [phone] Fax: [phone] - [address] [place] [phone] -  [phone]  [facility] [address]  Phone: [phone] Fax: [phone]    [facility] [address], [address] [facility] [address]  [address] [facility] Stefano Olsen [address]  Phone: [phone] Fax: [phone]

## 2020-08-24 NOTE — TELEPHONE ENCOUNTER
HYDROcodone-acetaminophen (NORCO) 5-325 MG per tablet  90 tablet  0  7/27/2020 8/26/2020     Sig - Route:  Take 1 tablet by mouth every 8 hours as needed for Pain for up to 30 days      Pharmacy:  [address], [address] [place], [place] [address] [phone]    Pharmacy Comments:

## 2020-09-23 RX ORDER — HYDROCODONE BITARTRATE AND ACETAMINOPHEN 5; 325 MG/1; MG/1
1 TABLET ORAL EVERY 8 HOURS PRN
Qty: 90 TABLET | Refills: 0 | Status: SHIPPED | OUTPATIENT
Start: 2020-09-23 | End: 2020-10-21 | Stop reason: SDUPTHER

## 2020-09-23 NOTE — TELEPHONE ENCOUNTER
Disp  Refills  Start  End     HYDROcodone-acetaminophen (NORCO) 5-325 MG per tablet  90 tablet  0  8/24/2020 9/23/2020     Sig - Route:  Take 1 tablet by mouth every 8 hours as needed for Pain for up to 30 days. - Oral       Pharmacy:  [facility] [address] [address], [address] [phone] Veronica [place] [phone]        Please advise

## 2020-09-23 NOTE — TELEPHONE ENCOUNTER
Medication:   Requested Prescriptions     Pending Prescriptions Disp Refills    HYDROcodone-acetaminophen (NORCO) 5-325 MG per tablet 90 tablet 0     Sig: Take 1 tablet by mouth every 8 hours as needed for Pain for up to 30 days. Last Filled:  8/24/20    Patient Phone Number: [phone] (home) [phone] (work)    Last appt: 7/1/2020   Next appt: 10/2/2020    Last OARRS:   RX Monitoring 4/9/2019   Attestation The Prescription Monitoring Report for this patient was reviewed today.    Periodic Controlled Substance Monitoring -     PDMP Monitoring:    Last PDMP Guillaume Bailey as Reviewed [facility]):  Review User Review Instant Review Result   Genia Cook 7/1/2020  8:55 AM Reviewed PDMP [1]     Preferred Pharmacy:   [facility] [address] [facility] [address] [facility] [phone] Johanny Alamo [phone]  [address] [address]  Phone: [phone] Fax: [phone] - [address] [phone] -  [phone]  [place] [address]  Phone: [phone] Fax: [phone]    [facility] [address], [address] [facility] [address] [facility] [address]  [address]  [place] [address]  Phone: [phone] Fax: [phone]

## 2020-09-30 ENCOUNTER — HOSPITAL ENCOUNTER (OUTPATIENT)
Age: 70
Discharge: HOME | End: 2020-09-30
Payer: MEDICARE

## 2020-09-30 LAB
A/G RATIO: 1.9 (ref 1.1–2.2)
ALBUMIN SERPL-MCNC: 4.5 G/DL (ref 3.4–5)
ALP BLD-CCNC: 111 U/L (ref 40–129)
ALT SERPL-CCNC: 17 U/L (ref 10–40)
ANION GAP SERPL CALCULATED.3IONS-SCNC: 14 MMOL/L (ref 3–16)
AST SERPL-CCNC: 21 U/L (ref 15–37)
BILIRUB SERPL-MCNC: 0.3 MG/DL (ref 0–1)
BUN BLDV-MCNC: 20 MG/DL (ref 7–20)
CALCIUM SERPL-MCNC: 9.2 MG/DL (ref 8.3–10.6)
CHLORIDE BLD-SCNC: 99 MMOL/L (ref 99–110)
CHOLESTEROL, TOTAL: 176 MG/DL (ref 0–199)
CO2: 23 MMOL/L (ref 21–32)
CREAT SERPL-MCNC: 1 MG/DL (ref 0.8–1.3)
ESTIMATED AVERAGE GLUCOSE: 119.8 MG/DL
GFR AFRICAN AMERICAN: >60
GFR NON-AFRICAN AMERICAN: >60
GLOBULIN: 2.4 G/DL
GLUCOSE BLD-MCNC: 105 MG/DL (ref 70–99)
HBA1C MFR BLD: 5.8 %
HDLC SERPL-MCNC: 47 MG/DL (ref 40–60)
LDL CHOLESTEROL CALCULATED: 74 MG/DL
POTASSIUM SERPL-SCNC: 4.2 MMOL/L (ref 3.5–5.1)
PROSTATE SPECIFIC ANTIGEN: 2.52 NG/ML (ref 0–4)
SODIUM BLD-SCNC: 136 MMOL/L (ref 136–145)
TOTAL PROTEIN: 6.9 G/DL (ref 6.4–8.2)
TRIGL SERPL-MCNC: 273 MG/DL (ref 0–150)
VLDLC SERPL CALC-MCNC: 55 MG/DL

## 2020-09-30 PROCEDURE — 83036 HEMOGLOBIN GLYCOSYLATED A1C: CPT

## 2020-09-30 PROCEDURE — 80061 LIPID PANEL: CPT

## 2020-09-30 PROCEDURE — 84153 ASSAY OF PSA TOTAL: CPT

## 2020-09-30 PROCEDURE — 80053 COMPREHEN METABOLIC PANEL: CPT

## 2020-09-30 PROCEDURE — 36415 COLL VENOUS BLD VENIPUNCTURE: CPT

## 2020-10-02 ENCOUNTER — OFFICE VISIT (OUTPATIENT)
Dept: FAMILY MEDICINE CLINIC | Age: 70
End: 2020-10-02
Payer: COMMERCIAL

## 2020-10-02 VITALS
DIASTOLIC BLOOD PRESSURE: 72 MMHG | TEMPERATURE: 97.1 F | HEART RATE: 77 BPM | WEIGHT: 180 LBS | RESPIRATION RATE: 12 BRPM | HEIGHT: 71 IN | OXYGEN SATURATION: 95 % | BODY MASS INDEX: 25.2 KG/M2 | SYSTOLIC BLOOD PRESSURE: 134 MMHG

## 2020-10-02 PROCEDURE — 90471 IMMUNIZATION ADMIN: CPT | Performed by: FAMILY MEDICINE

## 2020-10-02 PROCEDURE — 99214 OFFICE O/P EST MOD 30 MIN: CPT | Performed by: FAMILY MEDICINE

## 2020-10-02 PROCEDURE — 90694 VACC AIIV4 NO PRSRV 0.5ML IM: CPT | Performed by: FAMILY MEDICINE

## 2020-10-02 RX ORDER — LISINOPRIL AND HYDROCHLOROTHIAZIDE 20; 12.5 MG/1; MG/1
TABLET ORAL
Qty: 60 TABLET | Refills: 5 | Status: SHIPPED | OUTPATIENT
Start: 2020-10-02 | End: 2021-01-05 | Stop reason: SDUPTHER

## 2020-10-02 RX ORDER — ATORVASTATIN CALCIUM 20 MG/1
TABLET, FILM COATED ORAL
Qty: 30 TABLET | Refills: 5 | Status: SHIPPED | OUTPATIENT
Start: 2020-10-02 | End: 2021-01-05 | Stop reason: SDUPTHER

## 2020-10-02 NOTE — PROGRESS NOTES
Subjective:      Patient ID: Ashley Levy is a 72 y.o. male. CC: Patient presents for re-evaluation of chronic health problems including osteoarthritis, sciatica in the left leg, hyperlipidemia, hyperglycemia, gout and history of asbestosis. HPI Pt is here for a follow up, med refill, test results, and will like to discuss possible sciatica pain(left). Patient states he is been doing a lot more lifting and carrying as he is been helping with a house remodeling project. He denies any significant symptoms going to his left leg but predominantly into his buttocks area. He denies any loss of strength. He denies any gout flareups since last time. He unfortunately still smoking a pack of cigarettes per day. Patient does have history of asbestosis but has not shown any progression. Patient is very compliant with medications with no adverse reactions. Review of Systems  Patient Active Problem List   Diagnosis    Impotence of organic origin    Hyperglycemia    Mixed hyperlipidemia    Essential hypertension    Smoker unmotivated to quit    Asbestosis ([facility] [address]    Idiopathic gout    Primary osteoarthritis involving multiple joints       Outpatient Medications Marked as Taking for the 10/2/20 encounter (Office Visit) with Tonio Cope MD   Medication Sig Dispense Refill    HYDROcodone-acetaminophen (NORCO) 5-325 MG per tablet Take 1 tablet by mouth every 8 hours as needed for Pain for up to 30 days.  90 tablet 0    atorvastatin (LIPITOR) 20 MG tablet TAKE 1 TABLET DAILY 30 tablet 1    colchicine (COLCRYS) 0.6 MG tablet TAKE 2 TABLETS BY MOUTH AS ONE DOSE ON THE FIRST DAY THEN TAKE 1 TABLET BY MOUTH DAILY THEREAFTER UNTIL SYMPTOMS RESOLVE 60 tablet 1    lisinopril-hydroCHLOROthiazide (PRINZIDE;ZESTORETIC) 20-12.5 MG per tablet TAKE 1 TABLET TWICE A DAY 60 tablet 3       No Known Allergies    Social History     Tobacco Use    Smoking status: Current Every Day Smoker     Packs/day: 0.75     Years: 53.00 Quadriceps:  5/5/5   Right Hamstrings:  5/5/5   Left Hamstrings:  5/5/5     Tests   Straight leg raise right: negative  Straight leg raise left: negative    Other   Gait: normal             Assessment:      Anjum Neville was seen today for follow-up and hypertension. Diagnoses and all orders for this visit:    Essential hypertension  -     lisinopril-hydroCHLOROthiazide (PRINZIDE;ZESTORETIC) 20-12.5 MG per tablet; TAKE 1 TABLET TWICE A DAY    Acute gout involving toe of right foot, unspecified cause    Need for influenza vaccination  -     INFLUENZA, QUADV, ADJUVANTED, 72 YRS =, IM, PF, PREFILL SYR, 0.5ML (FLUAD)    Acute lumbar radiculopathy    Mixed hyperlipidemia    Hyperglycemia    Smoker unmotivated to quit    Asbestosis ([facility] [address]    Other orders  -     atorvastatin (LIPITOR) 20 MG tablet; TAKE 1 TABLET DAILY    OARRS report checked          Plan:      Pt appears stable & reviewed labs with patient. No change in current medications. For the lumbar radiculopathy we discussed moist heat and back stretching exercises. He may continue the pain pill on a as needed basis. Patient received counseling on the following healthy behaviors: nutrition and exercise     Patient given educational materials     Health maintenance updated    Discussed use, benefit, and side effects of prescribed medications. Barriers to medication compliance addressed. All patient questions answered. Pt voiced understanding. Patient needs RTC in 3 months. Please note that this chart was generated using Dragon dictation software. Although every effort was made to ensure the accuracy of this automated transcription, some errors in transcription may have occurred.

## 2020-10-02 NOTE — PROGRESS NOTES
Vaccine Information Sheet, \"Influenza - Inactivated\"  given to Kevin Harada, or parent/legal guardian of  Kevin Harada and verbalized understanding. Patient responses:    Have you ever had a reaction to a flu vaccine? No  Are you able to eat eggs without adverse effects? Yes  Do you have any current illness? No  Have you ever had Guillian Bristol Syndrome? No    Flu vaccine given per order. Please see immunization tab.     Immunization(s) given during visit:     Immunizations Administered     Name Date Dose Route    Influenza, Quadv, adjuvanted, 65 yrs +, IM, PF (Fluad) 10/2/2020 0.5 mL Intramuscular    Site: Deltoid- Right    Lot: [id number]    [address]: 18994-522-18

## 2020-10-02 NOTE — PATIENT INSTRUCTIONS
touching the floor and the other heel touching the wall. 4. Repeat with your other leg. 5. Do 2 to 4 times for each leg. Hip flexor stretch   1. Kneel on the floor with one knee bent and one leg behind you. Place your forward knee over your foot. Keep your other knee touching the floor. 2. Slowly push your hips forward until you feel a stretch in the upper thigh of your rear leg. 3. Hold the stretch for at least 15 to 30 seconds. Repeat with your other leg. 4. Do 2 to 4 times on each side. Wall sit   1. Stand with your back 10 to 12 inches away from a wall. 2. Lean into the wall until your back is flat against it. 3. Slowly slide down until your knees are slightly bent, pressing your lower back into the wall. 4. Hold for about 6 seconds, then slide back up the wall. 5. Repeat 8 to 12 times. Follow-up care is a key part of your treatment and safety. Be sure to make and go to all appointments, and call your doctor if you are having problems. It's also a good idea to know your test results and keep a list of the medicines you take. Where can you learn more? Go to https://Oxlo SystemspeJumpSoft."RiverGlass, Inc.". org and sign in to your Skycure account. Enter A065 in the [facility] box to learn more about \"Low Back Pain: Exercises. \"     If you do not have an account, please click on the \"Sign Up Now\" link. Current as of: March 2, 2020               Content Version: 12.5  © 2006-2020 Healthwise, Incorporated. Care instructions adapted under license by [facility]). If you have questions about a medical condition or this instruction, always ask your healthcare professional. [place] [address] any warranty or liability for your use of this information. Patient Education        Acute Low Back Pain: Exercises  Introduction  Here are some examples of typical rehabilitation exercises for your condition. Start each exercise slowly.  Ease off the exercise if you start to have pain.  Your doctor or physical therapist will tell you when you can start these exercises and which ones will work best for you. When you are not being active, find a comfortable position for rest. Some people are comfortable on the floor or a medium-firm bed with a small pillow under their head and another under their knees. Some people prefer to lie on their side with a pillow between their knees. Don't stay in one position for too long. Take short walks (10 to 20 minutes) every 2 to 3 hours. Avoid slopes, hills, and stairs until you feel better. Walk only distances you can manage without pain, especially leg pain. How to do the exercises  Back stretches   1. Get down on your hands and knees on the floor. 2. Relax your head and allow it to droop. Round your back up toward the ceiling until you feel a nice stretch in your upper, middle, and lower back. Hold this stretch for as long as it feels comfortable, or about 15 to 30 seconds. 3. Return to the starting position with a flat back while you are on your hands and knees. 4. Let your back sway by pressing your stomach toward the floor. Lift your buttocks toward the ceiling. 5. Hold this position for 15 to 30 seconds. 6. Repeat 2 to 4 times. Follow-up care is a key part of your treatment and safety. Be sure to make and go to all appointments, and call your doctor if you are having problems. It's also a good idea to know your test results and keep a list of the medicines you take. Where can you learn more? Go to https://PlaceIQelina.Gamzee. org and sign in to your [email or web address].[email or web address] account. Enter E652 in the [facility] box to learn more about \"Acute Low Back Pain: Exercises. \"     If you do not have an account, please click on the \"Sign Up Now\" link. Current as of: March 2, 2020               Content Version: 12.5  © 8489-2315 Healthwise, Incorporated. Care instructions adapted under license by [facility] ([facility]).  If you have questions about a

## 2020-10-12 ENCOUNTER — TELEPHONE (OUTPATIENT)
Dept: FAMILY MEDICINE CLINIC | Age: 70
End: 2020-10-12

## 2020-10-12 RX ORDER — COLCHICINE 0.6 MG/1
TABLET ORAL
Qty: 60 TABLET | Refills: 2 | Status: SHIPPED | OUTPATIENT
Start: 2020-10-12 | End: 2021-01-05 | Stop reason: SDUPTHER

## 2020-10-12 NOTE — TELEPHONE ENCOUNTER
----- Message from Anastacio Warren sent at 10/12/2020  2:52 PM EDT -----  Subject: Message to Provider    QUESTIONS  Information for Provider? Pt is wanting to know if their is a different   medication that is the same as colchicine (COLCRYS) 0.6 MG tablet he can   take besides this. Pt is stated his insurance only pays for it a couple   times a year. Pt would like to know about another med similar.   ---------------------------------------------------------------------------  --------------  CALL BACK INFO  What is the best way for the office to contact you? OK to leave message on   voicemail  Preferred Call Back Phone Number? [phone]  ---------------------------------------------------------------------------  --------------  SCRIPT ANSWERS  Relationship to Patient?  Self

## 2020-10-21 RX ORDER — HYDROCODONE BITARTRATE AND ACETAMINOPHEN 5; 325 MG/1; MG/1
1 TABLET ORAL EVERY 8 HOURS PRN
Qty: 90 TABLET | Refills: 0 | Status: SHIPPED | OUTPATIENT
Start: 2020-10-21 | End: 2020-11-18 | Stop reason: SDUPTHER

## 2020-10-21 NOTE — TELEPHONE ENCOUNTER
Medication:   Requested Prescriptions     Pending Prescriptions Disp Refills    HYDROcodone-acetaminophen (NORCO) 5-325 MG per tablet 90 tablet 0     Sig: Take 1 tablet by mouth every 8 hours as needed for Pain for up to 30 days. Last Filled:  9/23/20    Patient Phone Number: [phone] (home) [phone] (work)    Last appt: 10/2/2020   Next appt: 1/5/2021    Last OARRS:   RX Monitoring 4/9/2019   Attestation The Prescription Monitoring Report for this patient was reviewed today.    Periodic Controlled Substance Monitoring -     PDMP Monitoring:    Last PDMP Claudia Garcia as Reviewed [facility]):  Review User Review Instant Review Result   Silvia Yin 10/2/2020  9:09 AM Reviewed PDMP [1]     Preferred Pharmacy:     [facility] [address] [facility] [phone] Jean Claude Chairez [phone]  [address] [facility] [address]  Phone: [phone] Fax: [phone]

## 2020-10-21 NOTE — TELEPHONE ENCOUNTER
----- Message from Kirk Tripathi sent at 10/21/2020  8:35 AM EDT -----  Subject: Refill Request    QUESTIONS  Name of Medication? HYDROcodone-acetaminophen (NORCO) 5-325 MG per tablet  Patient-reported dosage and instructions? 2-3 a day  How many days do you have left? 3  Preferred Pharmacy? [facility] [address] [place] [address] [phone] - F [phone]  Pharmacy phone number (if available)? [phone]  Additional Information for Provider?   ---------------------------------------------------------------------------  --------------  [address]  What is the best way for the office to contact you? OK to leave message on   voicemail  Preferred Call Back Phone Number?  [phone]

## 2020-11-18 RX ORDER — HYDROCODONE BITARTRATE AND ACETAMINOPHEN 5; 325 MG/1; MG/1
1 TABLET ORAL EVERY 8 HOURS PRN
Qty: 90 TABLET | Refills: 0 | Status: SHIPPED | OUTPATIENT
Start: 2020-11-18 | End: 2020-12-16 | Stop reason: SDUPTHER

## 2020-11-18 NOTE — TELEPHONE ENCOUNTER
Medication:   Requested Prescriptions     Pending Prescriptions Disp Refills    HYDROcodone-acetaminophen (NORCO) 5-325 MG per tablet 90 tablet 0     Sig: Take 1 tablet by mouth every 8 hours as needed for Pain for up to 30 days. Last Filled:  10/21/2020    Patient Phone Number: [phone] (home) [phone] (work)    Last appt: 10/2/2020   Next appt: 1/5/2021    Last OARRS:   RX Monitoring 4/9/2019   Attestation The Prescription Monitoring Report for this patient was reviewed today.    Periodic Controlled Substance Monitoring -     PDMP Monitoring:    Last PDMP Jesus Márquez as Reviewed [facility]):  Review User Review Instant Review Result   Porsche Schmitt 10/2/2020  9:09 AM Reviewed PDMP [1]     Preferred Pharmacy:   [facility] [address] [facility] [address] Jim[address] [phone] Tyra Samson [phone]  [address] [place] [address]  Phone: [phone] Fax: [phone] - [facility] [address] [phone] - f [phone]  [place] [address]  Phone: [phone] Fax: [phone]    [facility] [address], [address] [facility] [address]  [address] [address]  Phone: [phone] Fax: [phone]

## 2020-11-18 NOTE — TELEPHONE ENCOUNTER
Disp  Refills  Start  End     HYDROcodone-acetaminophen (NORCO) 5-325 MG per tablet  90 tablet  0  10/21/2020  11/20/2020     Sig - Route:  Take 1 tablet by mouth every 8 hours as needed for Pain for up to 30 days. - Oral       Pharmacy:  [facility] [address] [address], Eligio Castro[address] [phone] Sara Samano [phone]       Please advise

## 2020-12-16 RX ORDER — HYDROCODONE BITARTRATE AND ACETAMINOPHEN 5; 325 MG/1; MG/1
1 TABLET ORAL EVERY 8 HOURS PRN
Qty: 90 TABLET | Refills: 0 | Status: SHIPPED | OUTPATIENT
Start: 2020-12-16 | End: 2021-01-13 | Stop reason: SDUPTHER

## 2020-12-16 NOTE — TELEPHONE ENCOUNTER
Medication:   Requested Prescriptions     Pending Prescriptions Disp Refills    HYDROcodone-acetaminophen (NORCO) 5-325 MG per tablet 90 tablet 0     Sig: Take 1 tablet by mouth every 8 hours as needed for Pain for up to 30 days. Last Filled:  11/18/20    Patient Phone Number: [phone] (home) [phone] (work)    Last appt: 10/2/2020   Next appt: 1/5/2021    Last OARRS:   RX Monitoring 4/9/2019   Attestation The Prescription Monitoring Report for this patient was reviewed today.    Periodic Controlled Substance Monitoring -     PDMP Monitoring:    Last PDMP [facility] as Reviewed [facility]):  Review User Review Instant Review Result   Alysha Campos 10/2/2020  9:09 AM Reviewed PDMP [1]     Preferred Pharmacy:     [facility] [address] [phone] Celine Mcclain [phone]  [address] [facility] [address]  Phone: [phone] Fax: [phone]

## 2020-12-16 NOTE — TELEPHONE ENCOUNTER
[facility] [address], [address] - P [phone] Freya Soni [phone] [address] [facility], [facility], [facility] [address]         HYDROcodone-acetaminophen ([address]) 5-325 MG per tablet [[id number]

## 2021-01-05 ENCOUNTER — OFFICE VISIT (OUTPATIENT)
Dept: FAMILY MEDICINE CLINIC | Age: 71
End: 2021-01-05
Payer: MEDICARE

## 2021-01-05 VITALS
BODY MASS INDEX: 25.96 KG/M2 | OXYGEN SATURATION: 98 % | HEART RATE: 87 BPM | SYSTOLIC BLOOD PRESSURE: 106 MMHG | DIASTOLIC BLOOD PRESSURE: 64 MMHG | WEIGHT: 184.8 LBS | TEMPERATURE: 96.8 F

## 2021-01-05 DIAGNOSIS — M10.9 ACUTE GOUT INVOLVING TOE OF RIGHT FOOT, UNSPECIFIED CAUSE: ICD-10-CM

## 2021-01-05 DIAGNOSIS — I10 ESSENTIAL HYPERTENSION: Primary | ICD-10-CM

## 2021-01-05 DIAGNOSIS — F17.200 SMOKER UNMOTIVATED TO QUIT: ICD-10-CM

## 2021-01-05 DIAGNOSIS — M15.9 PRIMARY OSTEOARTHRITIS INVOLVING MULTIPLE JOINTS: ICD-10-CM

## 2021-01-05 DIAGNOSIS — E78.2 MIXED HYPERLIPIDEMIA: ICD-10-CM

## 2021-01-05 PROCEDURE — 99214 OFFICE O/P EST MOD 30 MIN: CPT | Performed by: FAMILY MEDICINE

## 2021-01-05 RX ORDER — COLCHICINE 0.6 MG/1
0.6 TABLET ORAL 2 TIMES DAILY PRN
Qty: 60 TABLET | Refills: 2 | Status: SHIPPED | OUTPATIENT
Start: 2021-01-05 | End: 2022-02-28

## 2021-01-05 RX ORDER — ATORVASTATIN CALCIUM 20 MG/1
TABLET, FILM COATED ORAL
Qty: 90 TABLET | Refills: 1 | Status: SHIPPED | OUTPATIENT
Start: 2021-01-05 | End: 2021-02-01 | Stop reason: SDUPTHER

## 2021-01-05 RX ORDER — LISINOPRIL AND HYDROCHLOROTHIAZIDE 20; 12.5 MG/1; MG/1
TABLET ORAL
Qty: 180 TABLET | Refills: 1 | Status: SHIPPED | OUTPATIENT
Start: 2021-01-05 | End: 2021-02-01 | Stop reason: SDUPTHER

## 2021-01-05 ASSESSMENT — PATIENT HEALTH QUESTIONNAIRE - PHQ9
SUM OF ALL RESPONSES TO PHQ QUESTIONS 1-9: 0
SUM OF ALL RESPONSES TO PHQ9 QUESTIONS 1 & 2: 0
SUM OF ALL RESPONSES TO PHQ QUESTIONS 1-9: 0

## 2021-01-05 NOTE — PROGRESS NOTES
Subjective:      Patient ID: Pecola Spurling is a 72 y.o. male. CC: Patient presents for re-evaluation of chronic health problems including hypertension, hyperlipidemia, osteoarthritis, smoking and recurrent gout of the right foot. HPI Patient presents today for a follow-up on chronic medications and medical conditions. Patient overall feels he is unchanged from last time. He has had 1 more gout flareup and request a refill of colchicine medication to take on a as needed basis. He still continues take pain pills about 3 times a day for osteoarthritis. Patient is retired but still staying fairly active. He is not exercising on a regular basis but denies any chest pain or shortness of breath symptoms. He unfortunately still continues to smoke a pack of cigarettes per day and does not have any plans to stop smoking. Patient is very compliant with medications with no adverse reactions. Review of Systems     Patient Active Problem List   Diagnosis    Impotence of organic origin    Hyperglycemia    Mixed hyperlipidemia    Essential hypertension    Smoker unmotivated to quit    Asbestosis ([facility] [address]    Idiopathic gout    Primary osteoarthritis involving multiple joints       Outpatient Medications Marked as Taking for the 1/5/21 encounter (Office Visit) with Pat Carrington MD   Medication Sig Dispense Refill    HYDROcodone-acetaminophen (NORCO) 5-325 MG per tablet Take 1 tablet by mouth every 8 hours as needed for Pain for up to 30 days.  90 tablet 0    colchicine (COLCRYS) 0.6 MG tablet TAKE 2 TABLETS BY MOUTH AS ONE DOSE ON THE FIRST DAY THEN TAKE 1 TABLET BY MOUTH DAILY THEREAFTER UNTIL SYMPTOMS RESOLVE 60 tablet 2    atorvastatin (LIPITOR) 20 MG tablet TAKE 1 TABLET DAILY 30 tablet 5    lisinopril-hydroCHLOROthiazide (PRINZIDE;ZESTORETIC) 20-12.5 MG per tablet TAKE 1 TABLET TWICE A DAY 60 tablet 5       No Known Allergies    Social History     Tobacco Use    Smoking status: Current Every Day Smoker Packs/day: 0.75     Years: 53.00     Pack years: 39.75     Types: Cigarettes    Smokeless tobacco: Never Used   Substance Use Topics    Alcohol use: Yes     Comment: occasional alcohol use       /64 (Site: Left Upper Arm, Position: Sitting, Cuff Size: Medium Adult)   Pulse 87   Temp 96.8 °F (36 °C) (Infrared)   Wt 184 lb 12.8 oz (83.8 kg)   SpO2 98%   BMI 25.96 kg/m²         Objective:   Physical Exam  Constitutional:       General: He is not in acute distress. Appearance: He is well-developed. Neck:      Vascular: No carotid bruit. Cardiovascular:      Rate and Rhythm: Normal rate and regular rhythm. Pulses:           Dorsalis pedis pulses are 2+ on the right side and 2+ on the left side. Posterior tibial pulses are 2+ on the right side and 2+ on the left side. Heart sounds: Normal heart sounds. No murmur. No friction rub. No gallop. Pulmonary:      Effort: Pulmonary effort is normal.      Breath sounds: Rhonchi present. No decreased breath sounds or wheezing. Musculoskeletal: Normal range of motion. General: No tenderness. Right knee: He exhibits normal range of motion. Left knee: He exhibits deformity (crepitus). He exhibits normal range of motion and no swelling. Lumbar back: He exhibits spasm (Left lumbar). He exhibits no tenderness and no deformity. Right lower leg: No edema. Left lower leg: No edema. Comments: Hands with enlargement of the MP joints of multiple fingers. Skin:     Findings: No rash. Neurological:      Mental Status: He is alert and oriented to person, place, and time. Sensory: Sensation is intact. No sensory deficit. Motor: Motor function is intact. Psychiatric:         Behavior: Behavior normal. Behavior is cooperative. Assessment:      Doe Garcia was seen today for 3 month follow-up.     Diagnoses and all orders for this visit:    Essential hypertension  -     lisinopril-hydroCHLOROthiazide (PRINZIDE;ZESTORETIC) 20-12.5 MG per tablet; TAKE 1 TABLET TWICE A DAY    Acute gout involving toe of right foot, unspecified cause  -     colchicine (COLCRYS) 0.6 MG tablet; Take 1 tablet by mouth 2 times daily as needed for Pain (gout)    Mixed hyperlipidemia    Primary osteoarthritis involving multiple joints    Smoker unmotivated to quit    Other orders  -     atorvastatin (LIPITOR) 20 MG tablet; TAKE 1 TABLET DAILY      OARRS report checked        Plan:      Pt appears stable & reviewed labs with patient. No change in current medications. Patient received counseling on the following healthy behaviors: nutrition and exercise and encouraged patient is discontinue smoking    Discussed cutting back on pain medication if possible and using Tylenol instead. Also discussed moist heat and stretching, range of motion exercises    Patient given educational materials     Health maintenance updated    Discussed use, benefit, and side effects of prescribed medications. Barriers to medication compliance addressed. All patient questions answered. Pt voiced understanding. Patient needs RTC in 3 months. Medical decision making of moderate complexity. Please note that this chart was generated using Dragon dictation software. Although every effort was made to ensure the accuracy of this automated transcription, some errors in transcription may have occurred.

## 2021-01-13 DIAGNOSIS — M15.9 PRIMARY OSTEOARTHRITIS INVOLVING MULTIPLE JOINTS: ICD-10-CM

## 2021-01-13 RX ORDER — HYDROCODONE BITARTRATE AND ACETAMINOPHEN 5; 325 MG/1; MG/1
1 TABLET ORAL EVERY 8 HOURS PRN
Qty: 90 TABLET | Refills: 0 | Status: SHIPPED | OUTPATIENT
Start: 2021-01-13 | End: 2021-02-12 | Stop reason: SDUPTHER

## 2021-01-13 NOTE — TELEPHONE ENCOUNTER
Medication:   Requested Prescriptions     Pending Prescriptions Disp Refills    HYDROcodone-acetaminophen (NORCO) 5-325 MG per tablet 90 tablet 0     Sig: Take 1 tablet by mouth every 8 hours as needed for Pain for up to 30 days. Last Filled:  12/16/2020    Patient Phone Number: [phone] (home) [phone] (work)    Last appt: 1/5/2021   Next appt: 4/5/2021    Last OARRS:   RX Monitoring 4/9/2019   Attestation The Prescription Monitoring Report for this patient was reviewed today.    Periodic Controlled Substance Monitoring -     PDMP Monitoring:    Last PDMP Knute Holter as Reviewed [facility]):  Review User Review Instant Review Result   [facility] 1/5/2021  2:03 PM Reviewed PDMP [1]     Preferred Pharmacy:   [facility] [address] [facility] [address] [facility] [phone] Cheryl Lanes [phone]  [address] [place] [address]  Phone: [phone] Fax: [phone] - [facility] Patient, [address] [phone] - f [phone]  [place] [address]  Phone: [phone] Fax: [phone]    [facility] [address], [address] [facility] [address]  [address]  [place] [address]  Phone: [phone] Fax: [phone]

## 2021-01-13 NOTE — TELEPHONE ENCOUNTER
HYDROcodone-acetaminophen (NORCO) 5-325 MG per tablet 90 tablet 0 12/16/2020 1/15/2021    Sig - Route:  Take 1 tablet by mouth every 8 hours as needed for Pain for up to 30 days. - Oral          Kroger in chart

## 2021-02-01 DIAGNOSIS — I10 ESSENTIAL HYPERTENSION: ICD-10-CM

## 2021-02-01 RX ORDER — ATORVASTATIN CALCIUM 20 MG/1
TABLET, FILM COATED ORAL
Qty: 90 TABLET | Refills: 0 | Status: SHIPPED | OUTPATIENT
Start: 2021-02-01 | End: 2021-02-02 | Stop reason: SDUPTHER

## 2021-02-01 RX ORDER — LISINOPRIL AND HYDROCHLOROTHIAZIDE 20; 12.5 MG/1; MG/1
TABLET ORAL
Qty: 180 TABLET | Refills: 0 | Status: SHIPPED | OUTPATIENT
Start: 2021-02-01 | End: 2021-02-02 | Stop reason: SDUPTHER

## 2021-02-01 NOTE — TELEPHONE ENCOUNTER
There was an issue at this [facility], they had canceled his medications due to insurance reasons however the patient's wife called and stated that they told her if the Dr Kennedi Sy the Rx's they will fill them.      Error on pharmacies part,     Please [facility] the [facility]    lisinopril-hydroCHLOROthiazide (PRINZIDE;ZESTORETIC) 20-12.5 MG per tablet    atorvastatin (LIPITOR) 20 MG tablet

## 2021-02-02 RX ORDER — LISINOPRIL AND HYDROCHLOROTHIAZIDE 20; 12.5 MG/1; MG/1
TABLET ORAL
Qty: 180 TABLET | Refills: 0 | Status: SHIPPED | OUTPATIENT
Start: 2021-02-02 | End: 2021-02-03 | Stop reason: SDUPTHER

## 2021-02-02 RX ORDER — ATORVASTATIN CALCIUM 20 MG/1
TABLET, FILM COATED ORAL
Qty: 90 TABLET | Refills: 0 | Status: SHIPPED | OUTPATIENT
Start: 2021-02-02 | End: 2021-02-03 | Stop reason: SDUPTHER

## 2021-02-02 NOTE — TELEPHONE ENCOUNTER
Spoke with Wife, she states she talked to [facility] today but they made another error again and need them sent again to [facility]. The Rx that was sent on 2/1, they are no good because there were issues again with the COB.     Please contact wife if have any questions    Please advise

## 2021-02-03 DIAGNOSIS — I10 ESSENTIAL HYPERTENSION: ICD-10-CM

## 2021-02-03 RX ORDER — LISINOPRIL AND HYDROCHLOROTHIAZIDE 20; 12.5 MG/1; MG/1
TABLET ORAL
Qty: 180 TABLET | Refills: 0 | Status: SHIPPED | OUTPATIENT
Start: 2021-02-03 | End: 2021-04-05 | Stop reason: SDUPTHER

## 2021-02-03 RX ORDER — ATORVASTATIN CALCIUM 20 MG/1
TABLET, FILM COATED ORAL
Qty: 90 TABLET | Refills: 0 | Status: SHIPPED | OUTPATIENT
Start: 2021-02-03 | End: 2021-04-05 | Stop reason: SDUPTHER

## 2021-02-03 RX ORDER — LISINOPRIL AND HYDROCHLOROTHIAZIDE 20; 12.5 MG/1; MG/1
TABLET ORAL
Qty: 180 TABLET | Refills: 0 | Status: SHIPPED | OUTPATIENT
Start: 2021-02-03 | End: 2021-02-03 | Stop reason: SDUPTHER

## 2021-02-03 RX ORDER — ATORVASTATIN CALCIUM 20 MG/1
TABLET, FILM COATED ORAL
Qty: 90 TABLET | Refills: 0 | Status: SHIPPED | OUTPATIENT
Start: 2021-02-03 | End: 2021-02-03 | Stop reason: SDUPTHER

## 2021-02-11 DIAGNOSIS — M15.9 PRIMARY OSTEOARTHRITIS INVOLVING MULTIPLE JOINTS: ICD-10-CM

## 2021-02-11 NOTE — TELEPHONE ENCOUNTER
HYDROcodone-acetaminophen (NORCO) 5-325 MG per tablet 90 tablet 0 1/13/2021 2/12/2021    Sig - Route:  Take 1 tablet by mouth every 8 hours as needed for Pain for up to 30 days. - Oral          kroger in chart     Provider out of the office

## 2021-02-11 NOTE — TELEPHONE ENCOUNTER
Medication:   Requested Prescriptions     Pending Prescriptions Disp Refills    HYDROcodone-acetaminophen (NORCO) 5-325 MG per tablet 90 tablet 0     Sig: Take 1 tablet by mouth every 8 hours as needed for Pain for up to 30 days. Last Filled:  1/13/21    Patient Phone Number: [phone] (home) [phone] (work)    Last appt: 1/5/2021   Next appt: 4/5/2021    Last OARRS:   RX Monitoring 4/9/2019   Attestation The Prescription Monitoring Report for this patient was reviewed today.    Periodic Controlled Substance Monitoring -     PDMP Monitoring:    Last PDMP Damaris Hartman as Reviewed [facility]):  Review User Review Instant Review Result   Tiffany Shoulders 1/5/2021  2:03 PM Reviewed PDMP [1]     Preferred Pharmacy:   [facility] [facility] [phone] Fay Bass [phone]  [address] [place] [address]  Phone: [phone] Fax: [phone] [address] [phone] - f [phone]  [facility] [address]  Phone: [phone] Fax: [phone]    [facility], [address] [facility] [address] [facility] [address]  [address] [facility]  [facility] [address]  Phone: [phone] Fax: [phone]

## 2021-02-12 RX ORDER — HYDROCODONE BITARTRATE AND ACETAMINOPHEN 5; 325 MG/1; MG/1
1 TABLET ORAL EVERY 8 HOURS PRN
Qty: 90 TABLET | Refills: 0 | Status: SHIPPED | OUTPATIENT
Start: 2021-02-12 | End: 2021-03-12 | Stop reason: SDUPTHER

## 2021-03-12 DIAGNOSIS — M15.9 PRIMARY OSTEOARTHRITIS INVOLVING MULTIPLE JOINTS: ICD-10-CM

## 2021-03-12 RX ORDER — HYDROCODONE BITARTRATE AND ACETAMINOPHEN 5; 325 MG/1; MG/1
1 TABLET ORAL EVERY 8 HOURS PRN
Qty: 90 TABLET | Refills: 0 | Status: SHIPPED | OUTPATIENT
Start: 2021-03-12 | End: 2021-04-09 | Stop reason: SDUPTHER

## 2021-03-12 NOTE — TELEPHONE ENCOUNTER
HYDROcodone-acetaminophen (NORCO) 5-325 MG per tablet 90 tablet 0 2/12/2021 3/14/2021    Sig - Route:  Take 1 tablet by mouth every 8 hours as needed for Pain for up to 30 days. - Oral    Sent to pharmacy as: HYDROcodone-Acetaminophen 5-325 MG Oral Tablet (Todd Lanier          Welch in chart

## 2021-03-12 NOTE — TELEPHONE ENCOUNTER
Medication:   Requested Prescriptions     Pending Prescriptions Disp Refills    HYDROcodone-acetaminophen (NORCO) 5-325 MG per tablet 90 tablet 0     Sig: Take 1 tablet by mouth every 8 hours as needed for Pain for up to 30 days. Last Filled:  2/12/21    Patient Phone Number: [phone] (home) [phone] (work)    Last appt: 1/5/2021   Next appt: 4/5/2021    Last OARRS:   RX Monitoring 4/9/2019   Attestation The Prescription Monitoring Report for this patient was reviewed today.    Periodic Controlled Substance Monitoring -     PDMP Monitoring:    Last PDMP Zulma Walter as Reviewed [facility]):  Review User Review Instant Review Result   Ed Rk 1/5/2021  2:03 PM Reviewed PDMP [1]     Preferred Pharmacy:     [address] Eligio Jimenez [facility] [phone] Tammy Mckinley [phone]  [address] [facility] [address]  Phone: [phone] Fax: [phone]

## 2021-03-16 ENCOUNTER — IMMUNIZATION (OUTPATIENT)
Dept: PRIMARY CARE CLINIC | Age: 71
End: 2021-03-16
Payer: MEDICARE

## 2021-03-16 PROCEDURE — 0011A COVID-19, MODERNA VACCINE 100MCG/0.5ML DOSE: CPT

## 2021-03-16 PROCEDURE — 91301 COVID-19, MODERNA VACCINE 100MCG/0.5ML DOSE: CPT

## 2021-04-05 ENCOUNTER — OFFICE VISIT (OUTPATIENT)
Dept: FAMILY MEDICINE CLINIC | Age: 71
End: 2021-04-05
Payer: MEDICARE

## 2021-04-05 VITALS
WEIGHT: 186 LBS | SYSTOLIC BLOOD PRESSURE: 142 MMHG | OXYGEN SATURATION: 99 % | BODY MASS INDEX: 26.13 KG/M2 | HEART RATE: 81 BPM | TEMPERATURE: 97.3 F | DIASTOLIC BLOOD PRESSURE: 82 MMHG

## 2021-04-05 DIAGNOSIS — F17.200 SMOKER UNMOTIVATED TO QUIT: ICD-10-CM

## 2021-04-05 DIAGNOSIS — M10.00 IDIOPATHIC GOUT, UNSPECIFIED CHRONICITY, UNSPECIFIED SITE: ICD-10-CM

## 2021-04-05 DIAGNOSIS — M15.9 PRIMARY OSTEOARTHRITIS INVOLVING MULTIPLE JOINTS: Primary | ICD-10-CM

## 2021-04-05 DIAGNOSIS — I10 ESSENTIAL HYPERTENSION: ICD-10-CM

## 2021-04-05 PROCEDURE — 99214 OFFICE O/P EST MOD 30 MIN: CPT | Performed by: FAMILY MEDICINE

## 2021-04-05 RX ORDER — LISINOPRIL AND HYDROCHLOROTHIAZIDE 20; 12.5 MG/1; MG/1
TABLET ORAL
Qty: 180 TABLET | Refills: 1 | Status: SHIPPED | OUTPATIENT
Start: 2021-04-05 | End: 2021-10-06 | Stop reason: SDUPTHER

## 2021-04-05 RX ORDER — ATORVASTATIN CALCIUM 20 MG/1
TABLET, FILM COATED ORAL
Qty: 90 TABLET | Refills: 1 | Status: SHIPPED | OUTPATIENT
Start: 2021-04-05 | End: 2021-10-06 | Stop reason: SDUPTHER

## 2021-04-05 ASSESSMENT — PATIENT HEALTH QUESTIONNAIRE - PHQ9: 1. LITTLE INTEREST OR PLEASURE IN DOING THINGS: 0

## 2021-04-05 NOTE — PROGRESS NOTES
39.75     Types: Cigarettes    Smokeless tobacco: Never Used   Substance Use Topics    Alcohol use: Yes     Comment: occasional alcohol use       BP (!) 142/82 (Site: Right Upper Arm, Position: Sitting, Cuff Size: Medium Adult)   Pulse 81   Temp 97.3 °F (36.3 °C) (Infrared)   Wt 186 lb (84.4 kg)   SpO2 99%   BMI 26.13 kg/m²       Objective:   Physical Exam  Constitutional:       General: He is not in acute distress. Appearance: He is well-developed. Neck:      Vascular: No carotid bruit. Cardiovascular:      Rate and Rhythm: Normal rate and regular rhythm. Pulses:           Dorsalis pedis pulses are 2+ on the right side and 2+ on the left side. Posterior tibial pulses are 2+ on the right side and 2+ on the left side. Heart sounds: Normal heart sounds. No murmur. No friction rub. No gallop. Pulmonary:      Effort: Pulmonary effort is normal.      Breath sounds: Rhonchi present. No decreased breath sounds or wheezing. Musculoskeletal: Normal range of motion. General: No tenderness. Right knee: He exhibits normal range of motion. Left knee: He exhibits deformity (crepitus). He exhibits normal range of motion and no swelling. Lumbar back: He exhibits spasm (Left lumbar). He exhibits no tenderness and no deformity. Right lower leg: No edema. Left lower leg: No edema. Comments: Hands with enlargement of the MP joints of multiple fingers. Skin:     Findings: No rash. Neurological:      Mental Status: He is alert and oriented to person, place, and time. Sensory: Sensation is intact. No sensory deficit. Motor: Motor function is intact. Psychiatric:         Behavior: Behavior normal. Behavior is cooperative. Assessment:      Nisha Aldridge was seen today for follow-up and hypertension.     Diagnoses and all orders for this visit:    Primary osteoarthritis involving multiple joints    Essential hypertension  - lisinopril-hydroCHLOROthiazide (PRINZIDE;ZESTORETIC) 20-12.5 MG per tablet; TAKE 1 TABLET TWICE A DAY    Smoker unmotivated to quit    Idiopathic gout, unspecified chronicity, unspecified site    Other orders  -     atorvastatin (LIPITOR) 20 MG tablet; TAKE 1 TABLET DAILY    OARRS report checked          Plan:      Maintain current medications    Try to minimize pain medications    Discussed stopping smoking techniques and patient is receptive    RTC 3 months    Medical decision making of moderate complexity. Please note that this chart was generated using Dragon dictation software. Although every effort was made to ensure the accuracy of this automated transcription, some errors in transcription may have occurred.

## 2021-04-09 DIAGNOSIS — M15.9 PRIMARY OSTEOARTHRITIS INVOLVING MULTIPLE JOINTS: ICD-10-CM

## 2021-04-09 RX ORDER — HYDROCODONE BITARTRATE AND ACETAMINOPHEN 5; 325 MG/1; MG/1
1 TABLET ORAL EVERY 8 HOURS PRN
Qty: 90 TABLET | Refills: 0 | Status: SHIPPED | OUTPATIENT
Start: 2021-04-09 | End: 2021-05-07 | Stop reason: SDUPTHER

## 2021-04-09 NOTE — TELEPHONE ENCOUNTER
HYDROcodone-acetaminophen (NORCO) 5-325 MG per tablet 90 tablet 0 3/12/2021 4/11/2021    Sig - Route:  Take 1 tablet by mouth every 8 hours as needed for Pain for up to 30 days. - Oral          Kroger in chart

## 2021-04-09 NOTE — TELEPHONE ENCOUNTER
Medication:   Requested Prescriptions     Pending Prescriptions Disp Refills    HYDROcodone-acetaminophen (NORCO) 5-325 MG per tablet 90 tablet 0     Sig: Take 1 tablet by mouth every 8 hours as needed for Pain for up to 30 days. Last Filled: 3/12/2021    Patient Phone Number: [phone] (home) [phone] (work)    Last appt: 4/5/2021   Next appt: 7/6/2021    Last OARRS:   RX Monitoring 4/9/2019   Attestation The Prescription Monitoring Report for this patient was reviewed today.    Periodic Controlled Substance Monitoring -     PDMP Monitoring:    Last PDMP George Long as Reviewed [facility]):  Review User Review Instant Review Result   Siva Aviles 4/5/2021  9:06 AM Reviewed PDMP [1]     Preferred Pharmacy:   [facility] [address] [facility] Eligio Jimenez 1213 [phone] Jeny Julio [phone]  [address] [place] [address]  Phone: [phone] Fax: [phone] - Iban Anderson, [facility] [phone] - f [phone]  [facility] [address]  Phone: [phone] Fax: [phone]    [facility] [address] [facility] 143, [address] [facility] 800 [facility] [address]  [address] [facility] [address]  [facility] [address]  Phone: [phone] Fax: [phone]

## 2021-04-13 ENCOUNTER — IMMUNIZATION (OUTPATIENT)
Dept: PRIMARY CARE CLINIC | Age: 71
End: 2021-04-13
Payer: MEDICARE

## 2021-04-13 PROCEDURE — 0012A COVID-19, MODERNA VACCINE 100MCG/0.5ML DOSE: CPT | Performed by: FAMILY MEDICINE

## 2021-04-13 PROCEDURE — 91301 COVID-19, MODERNA VACCINE 100MCG/0.5ML DOSE: CPT | Performed by: FAMILY MEDICINE

## 2021-05-07 DIAGNOSIS — M15.9 PRIMARY OSTEOARTHRITIS INVOLVING MULTIPLE JOINTS: ICD-10-CM

## 2021-05-07 RX ORDER — HYDROCODONE BITARTRATE AND ACETAMINOPHEN 5; 325 MG/1; MG/1
1 TABLET ORAL EVERY 8 HOURS PRN
Qty: 90 TABLET | Refills: 0 | Status: SHIPPED | OUTPATIENT
Start: 2021-05-07 | End: 2021-06-04 | Stop reason: SDUPTHER

## 2021-05-07 NOTE — TELEPHONE ENCOUNTER
PT is requesting refills on HYDROcodone-acetaminophen (NORCO) 5-325 MG per tablet to please be called into [facility] [address], [address] [place] [phone] [facility]

## 2021-05-07 NOTE — TELEPHONE ENCOUNTER
Medication:   Requested Prescriptions     Pending Prescriptions Disp Refills    HYDROcodone-acetaminophen (NORCO) 5-325 MG per tablet 90 tablet 0     Sig: Take 1 tablet by mouth every 8 hours as needed for Pain for up to 30 days. Last Filled:  4/9/21    Patient Phone Number: [phone] (home) [phone] (work)    Last appt: 4/5/2021   Next appt: 7/6/2021    Last OARRS:   RX Monitoring 4/9/2019   Attestation The Prescription Monitoring Report for this patient was reviewed today.    Periodic Controlled Substance Monitoring -     PDMP Monitoring:    Last PDMP Sommer Presley as Reviewed [facility]):  Review User Review Instant Review Result   Lisa Vogel 4/5/2021  9:06 AM Reviewed PDMP [1]     Preferred Pharmacy:     [facility] [address] [facility] Eligio Jimenez [facility] [phone] [facility] [phone]  [facility] [address]  Phone: [phone] Fax: [phone]

## 2021-06-04 DIAGNOSIS — M15.9 PRIMARY OSTEOARTHRITIS INVOLVING MULTIPLE JOINTS: ICD-10-CM

## 2021-06-04 RX ORDER — HYDROCODONE BITARTRATE AND ACETAMINOPHEN 5; 325 MG/1; MG/1
1 TABLET ORAL EVERY 8 HOURS PRN
Qty: 90 TABLET | Refills: 0 | Status: SHIPPED | OUTPATIENT
Start: 2021-06-04 | End: 2021-07-02 | Stop reason: SDUPTHER

## 2021-06-04 NOTE — TELEPHONE ENCOUNTER
HYDROcodone-acetaminophen (NORCO) 5-325 MG per tablet 90 tablet 0 5/7/2021 6/6/2021    Sig - Route:  Take 1 tablet by mouth every 8 hours as needed for Pain for up to 30 days. - Oral          kroger in chart

## 2021-06-04 NOTE — TELEPHONE ENCOUNTER
Medication:   Requested Prescriptions     Pending Prescriptions Disp Refills    HYDROcodone-acetaminophen (NORCO) 5-325 MG per tablet 90 tablet 0     Sig: Take 1 tablet by mouth every 8 hours as needed for Pain for up to 30 days. Last Filled:  5/7/2021    Patient Phone Number: [phone] (home) [phone] (work)    Last appt: 4/5/2021   Next appt: 7/6/2021    Last OARRS:   RX Monitoring 4/9/2019   Attestation The Prescription Monitoring Report for this patient was reviewed today.    Periodic Controlled Substance Monitoring -     PDMP Monitoring:    Last PDMP [facility] as Reviewed [facility]):  Review User Review Instant Review Result   Kokoahnnah Tristan 4/5/2021  9:06 AM Reviewed PDMP [1]     Preferred Pharmacy:   [facility] [address] [facility] Eligio Jimenez [facility] [phone] [facility] [phone]  [address] [place] [address]  Phone: [phone] Fax: [phone] - Arelis [place] [address] [phone] - f [phone]  [place] [address]  Phone: [phone] Fax: [phone]    [facility] [address], [address] [facility] 800 [facility] [address]  [address] [facility]  [facility] [address]  Phone: [phone] Fax: [phone]

## 2021-07-02 DIAGNOSIS — M15.9 PRIMARY OSTEOARTHRITIS INVOLVING MULTIPLE JOINTS: ICD-10-CM

## 2021-07-02 RX ORDER — HYDROCODONE BITARTRATE AND ACETAMINOPHEN 5; 325 MG/1; MG/1
1 TABLET ORAL EVERY 8 HOURS PRN
Qty: 90 TABLET | Refills: 0 | Status: SHIPPED | OUTPATIENT
Start: 2021-07-03 | End: 2021-07-30 | Stop reason: SDUPTHER

## 2021-07-02 NOTE — TELEPHONE ENCOUNTER
Medication:   Requested Prescriptions     Pending Prescriptions Disp Refills    HYDROcodone-acetaminophen (NORCO) 5-325 MG per tablet 90 tablet 0     Sig: Take 1 tablet by mouth every 8 hours as needed for Pain for up to 30 days. Last Filled:  6/4/21    Patient Phone Number: [phone] (home) [phone] (work)    Last appt: 4/5/2021   Next appt: 7/6/2021    Last OARRS:   RX Monitoring 4/9/2019   Attestation The Prescription Monitoring Report for this patient was reviewed today.    Periodic Controlled Substance Monitoring -     PDMP Monitoring:    Last PDMP Sheila Conti as Reviewed [facility]):  Review User Review Instant Review Result   Rafat Barrera 4/5/2021  9:06 AM Reviewed PDMP [1]     Preferred Pharmacy:   [facility] [address] [place] [address] Eligio Jimenez [facility] [phone] Denise Vieira [phone] [address] [place] [address]  Phone: [phone] Fax: [phone]

## 2021-07-02 NOTE — TELEPHONE ENCOUNTER
[facility] [address], Eligio Hylton [facility] [phone] Jean Claude Chairez [phone]   [facility] [place], [address] [place] [address] Avenue   Phone:  [phone]  Fax:  [phone]      HYDROcodone-acetaminophen (Ken Mall) 5-325 MG per tablet [[id number]          Patients provider is out of office

## 2021-07-06 ENCOUNTER — OFFICE VISIT (OUTPATIENT)
Dept: FAMILY MEDICINE CLINIC | Age: 71
End: 2021-07-06
Payer: MEDICARE

## 2021-07-06 VITALS
SYSTOLIC BLOOD PRESSURE: 130 MMHG | WEIGHT: 183.8 LBS | DIASTOLIC BLOOD PRESSURE: 80 MMHG | OXYGEN SATURATION: 98 % | HEART RATE: 87 BPM | TEMPERATURE: 97.5 F | BODY MASS INDEX: 25.82 KG/M2

## 2021-07-06 DIAGNOSIS — I10 ESSENTIAL HYPERTENSION: ICD-10-CM

## 2021-07-06 DIAGNOSIS — R73.9 HYPERGLYCEMIA: ICD-10-CM

## 2021-07-06 DIAGNOSIS — Z12.5 SCREENING PSA (PROSTATE SPECIFIC ANTIGEN): ICD-10-CM

## 2021-07-06 DIAGNOSIS — M15.9 PRIMARY OSTEOARTHRITIS INVOLVING MULTIPLE JOINTS: Primary | ICD-10-CM

## 2021-07-06 DIAGNOSIS — J61 ASBESTOSIS (HCC): ICD-10-CM

## 2021-07-06 DIAGNOSIS — M10.00 IDIOPATHIC GOUT, UNSPECIFIED CHRONICITY, UNSPECIFIED SITE: ICD-10-CM

## 2021-07-06 PROCEDURE — 99214 OFFICE O/P EST MOD 30 MIN: CPT | Performed by: FAMILY MEDICINE

## 2021-07-06 PROCEDURE — 3288F FALL RISK ASSESSMENT DOCD: CPT | Performed by: FAMILY MEDICINE

## 2021-07-06 ASSESSMENT — PATIENT HEALTH QUESTIONNAIRE - PHQ9
SUM OF ALL RESPONSES TO PHQ QUESTIONS 1-9: 0
SUM OF ALL RESPONSES TO PHQ QUESTIONS 1-9: 0
SUM OF ALL RESPONSES TO PHQ9 QUESTIONS 1 & 2: 0
SUM OF ALL RESPONSES TO PHQ QUESTIONS 1-9: 0
1. LITTLE INTEREST OR PLEASURE IN DOING THINGS: 0
2. FEELING DOWN, DEPRESSED OR HOPELESS: 0

## 2021-07-06 NOTE — PROGRESS NOTES
Subjective:      Patient ID: Evelia Newell is a 80 y.o. male. CC: Patient presents for re-evaluation of chronic health problems including osteoarthritis, history of asbestosis exposure, hypertension, gout and hyperglycemia. HPI Patient presents today for a follow-up on chronic medications and medical conditions. Patient feels overall he is unchanged from last visit. He has been more active in the last month with gardening and yard work and with that he is lost some weight. He still required a pain pill 3 times a day. He states he plans to think about going back to work in the fall for part-time job. He denies any chest pain or shortness of breath with exercise. He knows he is still smoking a pack of cigarettes per day and does not have any plans were to stop smoking. Review of Systems     Patient Active Problem List   Diagnosis    Impotence of organic origin    Hyperglycemia    Mixed hyperlipidemia    Essential hypertension    Smoker unmotivated to quit    Asbestosis ([facility] [address]    Idiopathic gout    Primary osteoarthritis involving multiple joints       Outpatient Medications Marked as Taking for the 7/6/21 encounter (Office Visit) with Joselin Wilkerson MD   Medication Sig Dispense Refill    HYDROcodone-acetaminophen (NORCO) 5-325 MG per tablet Take 1 tablet by mouth every 8 hours as needed for Pain for up to 30 days. 90 tablet 0    atorvastatin (LIPITOR) 20 MG tablet TAKE 1 TABLET DAILY 90 tablet 1    lisinopril-hydroCHLOROthiazide (PRINZIDE;ZESTORETIC) 20-12.5 MG per tablet TAKE 1 TABLET TWICE A  tablet 1    colchicine (COLCRYS) 0.6 MG tablet Take 1 tablet by mouth 2 times daily as needed for Pain (gout) 60 tablet 2       No Known Allergies    Social History     Tobacco Use    Smoking status: Current Every Day Smoker     Packs/day: 0.75     Years: 53.00     Pack years: 39.75     Types: Cigarettes    Smokeless tobacco: Never Used   Substance Use Topics    Alcohol use:  Yes Comment: occasional alcohol use       /80 (Site: Left Upper Arm, Position: Sitting, Cuff Size: Medium Adult)   Pulse 87   Temp 97.5 °F (36.4 °C) (Infrared)   Wt 183 lb 12.8 oz (83.4 kg)   SpO2 98%   BMI 25.82 kg/m²           Objective:   Physical Exam  Constitutional:       General: He is not in acute distress. Appearance: He is well-developed. Neck:      Vascular: No carotid bruit. Cardiovascular:      Rate and Rhythm: Normal rate and regular rhythm. Pulses:           Dorsalis pedis pulses are 2+ on the right side and 2+ on the left side. Posterior tibial pulses are 2+ on the right side and 2+ on the left side. Heart sounds: Normal heart sounds. No murmur heard. No friction rub. No gallop. Pulmonary:      Effort: Pulmonary effort is normal.      Breath sounds: Rhonchi present. No decreased breath sounds or wheezing. Musculoskeletal:         General: No tenderness. Normal range of motion. Lumbar back: Spasms (Left lumbar) present. No deformity or tenderness. Right knee: Normal range of motion. Left knee: Deformity (crepitus) present. No swelling. Normal range of motion. Right lower leg: No edema. Left lower leg: No edema. Comments: Hands with enlargement of the MP joints of multiple fingers. Skin:     Findings: No rash. Neurological:      Mental Status: He is alert and oriented to person, place, and time. Sensory: Sensation is intact. No sensory deficit. Motor: Motor function is intact. Psychiatric:         Behavior: Behavior normal. Behavior is cooperative. Assessment:      Jennifer Rodarte was seen today for 3 month follow-up and hypertension. Diagnoses and all orders for this visit:    Primary osteoarthritis involving multiple joints    Asbestosis ([address]    Essential hypertension  -     Comprehensive Metabolic Panel; Future  -     Lipid Panel;  Future    Idiopathic gout, unspecified chronicity, unspecified site    Hyperglycemia  -     Hemoglobin A1C; Future    Screening PSA (prostate specific antigen)  -     PSA screening; Future      OARRS report checked        Plan:      Pt appears stable & labs ordered. Adjustments of medication will be done after laboratory testing results available. Patient received counseling on the following healthy behaviors: nutrition and exercise     Patient given educational materials     Health maintenance updated    Discussed use, benefit, and side effects of prescribed medications. Barriers to medication compliance addressed. All patient questions answered. Pt voiced understanding. Patient needs RTC in 3 months. Medical decision making of moderate complexity. Please note that this chart was generated using Dragon dictation software. Although every effort was made to ensure the accuracy of this automated transcription, some errors in transcription may have occurred.

## 2021-07-09 ENCOUNTER — TELEPHONE (OUTPATIENT)
Dept: ADMINISTRATIVE | Age: 71
End: 2021-07-09

## 2021-07-09 NOTE — TELEPHONE ENCOUNTER
Submitted PA for Colchicine 0.6MG tablets. Key: [id number]. Via CMM STATUS: Drug is covered by current benefit plan. No further PA activity needed. Will scan letter when received. Please notify patient, thank you.

## 2021-07-30 DIAGNOSIS — M15.9 PRIMARY OSTEOARTHRITIS INVOLVING MULTIPLE JOINTS: ICD-10-CM

## 2021-07-30 RX ORDER — HYDROCODONE BITARTRATE AND ACETAMINOPHEN 5; 325 MG/1; MG/1
1 TABLET ORAL EVERY 8 HOURS PRN
Qty: 90 TABLET | Refills: 0 | Status: SHIPPED | OUTPATIENT
Start: 2021-07-30 | End: 2021-08-30 | Stop reason: SDUPTHER

## 2021-07-30 NOTE — TELEPHONE ENCOUNTER
Medication:   Requested Prescriptions     Pending Prescriptions Disp Refills    HYDROcodone-acetaminophen (NORCO) 5-325 MG per tablet 90 tablet 0     Sig: Take 1 tablet by mouth every 8 hours as needed for Pain for up to 30 days. Last Filled:  7/3/21    Patient Phone Number: [phone] (home) [phone] (work)    Last appt: 7/6/2021   Next appt: 10/6/2021    Last OARRS:   RX Monitoring 4/9/2019   Attestation The Prescription Monitoring Report for this patient was reviewed today.    Periodic Controlled Substance Monitoring -     PDMP Monitoring:    Last PDMP Ector Pena as Reviewed [facility]):  Review User Review Instant Review Result   Shae Brambila 7/6/2021  9:35 AM Reviewed PDMP [1]     Preferred Pharmacy:     [facility] [address] [facility] Eligio Jimenez [facility] [phone] Karla Gaitan [phone]  [address] [address]  Phone: [phone] Fax: [phone]

## 2021-08-30 DIAGNOSIS — M15.9 PRIMARY OSTEOARTHRITIS INVOLVING MULTIPLE JOINTS: ICD-10-CM

## 2021-08-30 RX ORDER — HYDROCODONE BITARTRATE AND ACETAMINOPHEN 5; 325 MG/1; MG/1
1 TABLET ORAL EVERY 8 HOURS PRN
Qty: 90 TABLET | Refills: 0 | Status: SHIPPED | OUTPATIENT
Start: 2021-08-30 | End: 2021-09-28 | Stop reason: SDUPTHER

## 2021-08-30 NOTE — TELEPHONE ENCOUNTER
Pt is requesting a refill on HYDROcodone-acetaminophen (NORCO) 5-325 MG per tablet to please be called into [address] [place] [address] [address] [facility], [address] Zora Suero - [place] [phone]

## 2021-08-30 NOTE — TELEPHONE ENCOUNTER
Medication:   Requested Prescriptions     Pending Prescriptions Disp Refills    HYDROcodone-acetaminophen (NORCO) 5-325 MG per tablet 90 tablet 0     Sig: Take 1 tablet by mouth every 8 hours as needed for Pain for up to 30 days. Last Filled:  7/30/2021    Patient Phone Number: [phone] (home) [phone] (work)    Last appt: 7/6/2021   Next appt: 10/6/2021    Last OARRS:   RX Monitoring 4/9/2019   Attestation The Prescription Monitoring Report for this patient was reviewed today.    Periodic Controlled Substance Monitoring -     PDMP Monitoring:    Last PDMP [facility] Records as Reviewed [facility]):  Review User Review Instant Review Result   Bhargavrosalind Last 7/6/2021  9:35 AM Reviewed PDMP [1]     Preferred Pharmacy:   [facility] [address] [facility] [address] [address] [phone] [facility] [phone]  [address] [place] [address]  Phone: [phone] Fax: [phone] - Annika [facility], [address] [phone] - f [phone]  [facility] [address]  Phone: [phone] Fax: Maribell Crain [address], [address] [facility] [address]  47[address] [facility] [address]  Phone: [phone] Fax: [phone]

## 2021-09-22 ENCOUNTER — HOSPITAL ENCOUNTER (OUTPATIENT)
Age: 71
Discharge: HOME | End: 2021-09-22
Payer: MEDICARE

## 2021-09-22 DIAGNOSIS — Z12.5 SCREENING PSA (PROSTATE SPECIFIC ANTIGEN): ICD-10-CM

## 2021-09-22 DIAGNOSIS — R73.9 HYPERGLYCEMIA: ICD-10-CM

## 2021-09-22 DIAGNOSIS — I10 ESSENTIAL HYPERTENSION: ICD-10-CM

## 2021-09-22 LAB
A/G RATIO: 1.7 (ref 1.1–2.2)
ALBUMIN SERPL-MCNC: 4.7 G/DL (ref 3.4–5)
ALP BLD-CCNC: 101 U/L (ref 40–129)
ALT SERPL-CCNC: 16 U/L (ref 10–40)
ANION GAP SERPL CALCULATED.3IONS-SCNC: 14 MMOL/L (ref 3–16)
AST SERPL-CCNC: 23 U/L (ref 15–37)
BILIRUB SERPL-MCNC: 0.6 MG/DL (ref 0–1)
BUN BLDV-MCNC: 15 MG/DL (ref 7–20)
CALCIUM SERPL-MCNC: 9.3 MG/DL (ref 8.3–10.6)
CHLORIDE BLD-SCNC: 98 MMOL/L (ref 99–110)
CHOLESTEROL, TOTAL: 205 MG/DL (ref 0–199)
CO2: 25 MMOL/L (ref 21–32)
CREAT SERPL-MCNC: 0.9 MG/DL (ref 0.8–1.3)
ESTIMATED AVERAGE GLUCOSE: 122.6 MG/DL
GFR AFRICAN AMERICAN: >60
GFR NON-AFRICAN AMERICAN: >60
GLOBULIN: 2.7 G/DL
GLUCOSE BLD-MCNC: 103 MG/DL (ref 70–99)
HBA1C MFR BLD: 5.9 %
HDLC SERPL-MCNC: 57 MG/DL (ref 40–60)
LDL CHOLESTEROL CALCULATED: 100 MG/DL
POTASSIUM SERPL-SCNC: 4.8 MMOL/L (ref 3.5–5.1)
PROSTATE SPECIFIC ANTIGEN: 1.29 NG/ML (ref 0–4)
SODIUM BLD-SCNC: 137 MMOL/L (ref 136–145)
TOTAL PROTEIN: 7.4 G/DL (ref 6.4–8.2)
TRIGL SERPL-MCNC: 240 MG/DL (ref 0–150)
VLDLC SERPL CALC-MCNC: 48 MG/DL

## 2021-09-22 PROCEDURE — 80053 COMPREHEN METABOLIC PANEL: CPT

## 2021-09-22 PROCEDURE — 84153 ASSAY OF PSA TOTAL: CPT

## 2021-09-22 PROCEDURE — 80061 LIPID PANEL: CPT

## 2021-09-22 PROCEDURE — 83036 HEMOGLOBIN GLYCOSYLATED A1C: CPT

## 2021-09-22 PROCEDURE — 36415 COLL VENOUS BLD VENIPUNCTURE: CPT

## 2021-09-28 DIAGNOSIS — M15.9 PRIMARY OSTEOARTHRITIS INVOLVING MULTIPLE JOINTS: ICD-10-CM

## 2021-09-28 RX ORDER — HYDROCODONE BITARTRATE AND ACETAMINOPHEN 5; 325 MG/1; MG/1
1 TABLET ORAL EVERY 8 HOURS PRN
Qty: 90 TABLET | Refills: 0 | Status: SHIPPED | OUTPATIENT
Start: 2021-09-28 | End: 2021-10-27 | Stop reason: SDUPTHER

## 2021-09-28 NOTE — TELEPHONE ENCOUNTER
PT is requesting a refill on HYDROcodone-acetaminophen (NORCO) 5-325 MG per tablet to please be sent to [address], [address] [place] [address]

## 2021-10-06 ENCOUNTER — OFFICE VISIT (OUTPATIENT)
Dept: FAMILY MEDICINE CLINIC | Age: 71
End: 2021-10-06
Payer: MEDICARE

## 2021-10-06 VITALS
BODY MASS INDEX: 25.68 KG/M2 | SYSTOLIC BLOOD PRESSURE: 132 MMHG | DIASTOLIC BLOOD PRESSURE: 74 MMHG | HEART RATE: 90 BPM | WEIGHT: 179.38 LBS | TEMPERATURE: 97.4 F | OXYGEN SATURATION: 93 % | RESPIRATION RATE: 12 BRPM | HEIGHT: 70 IN

## 2021-10-06 DIAGNOSIS — R73.9 HYPERGLYCEMIA: ICD-10-CM

## 2021-10-06 DIAGNOSIS — E78.2 MIXED HYPERLIPIDEMIA: ICD-10-CM

## 2021-10-06 DIAGNOSIS — Z87.891 PERSONAL HISTORY OF TOBACCO USE: ICD-10-CM

## 2021-10-06 DIAGNOSIS — Z12.2 ENCOUNTER FOR SCREENING FOR LUNG CANCER: ICD-10-CM

## 2021-10-06 DIAGNOSIS — F17.200 SMOKER UNMOTIVATED TO QUIT: ICD-10-CM

## 2021-10-06 DIAGNOSIS — I10 ESSENTIAL HYPERTENSION: Primary | ICD-10-CM

## 2021-10-06 DIAGNOSIS — M15.9 PRIMARY OSTEOARTHRITIS INVOLVING MULTIPLE JOINTS: ICD-10-CM

## 2021-10-06 PROCEDURE — 99214 OFFICE O/P EST MOD 30 MIN: CPT | Performed by: FAMILY MEDICINE

## 2021-10-06 RX ORDER — LISINOPRIL AND HYDROCHLOROTHIAZIDE 20; 12.5 MG/1; MG/1
TABLET ORAL
Qty: 180 TABLET | Refills: 1 | Status: SHIPPED | OUTPATIENT
Start: 2021-10-06 | End: 2021-10-07 | Stop reason: SDUPTHER

## 2021-10-06 RX ORDER — ATORVASTATIN CALCIUM 20 MG/1
TABLET, FILM COATED ORAL
Qty: 90 TABLET | Refills: 1 | Status: SHIPPED | OUTPATIENT
Start: 2021-10-06 | End: 2021-10-07 | Stop reason: SDUPTHER

## 2021-10-06 NOTE — PROGRESS NOTES
Low Dose CT (LDCT) Lung Screening criteria met:     Age 55-77(Medicare) or 50-80 ([facility])   Pack year smoking >30 (Medicare) or >20 ([facility])   Still smoking or less than 15 year since quit   No sign or symptoms of lung cancer   > 11 months since last LDCT     Risks and benefits of lung cancer screening with LDCT scans discussed:    Significance of positive screen - False-positive LDCT results often occur. 95% of all positive results do not lead to a diagnosis of cancer. Usually further imaging can resolve most false-positive results; however, some patients may require invasive procedures. Over diagnosis risk - 10% to 12% of screen-detected lung cancer cases are over diagnosedthat is, the cancer would not have been detected in the patient's lifetime without the screening. Need for follow up screens annually to continue lung cancer screening effectiveness     Risks associated with radiation from annual LDCT- Radiation exposure is about the same as for a mammogram, which is about 1/3 of the annual background radiation exposure from everyday life. Starting screening at age 54 is not likely to increase cancer risk from radiation exposure. Patients with comorbidities resulting in life expectancy of < 10 years, or that would preclude treatment of an abnormality identified on CT, should not be screened due to lack of benefit.     To obtain maximal benefit from this screening, smoking cessation and long-term abstinence from smoking is critical

## 2021-10-06 NOTE — PROGRESS NOTES
Subjective:      Patient ID: Judith Galeano is a 80 y.o. male. CC: Patient presents for re-evaluation of chronic health problems including hypertension, osteoarthritis, hyperglycemia, smoker and not wanting to stop smoking. HPI pt is here for a follow up, med refill. Patient states she started working part-time in the last month and enjoys the part-time hours. He still smoking half pack of cigarettes per day. He does have a history of asbestosis in the past but has not had a CT scan. He did have a laboratory assessment performed which was reviewed. He typically takes the pain pill three times a day. Review of Systems  Patient Active Problem List   Diagnosis    Impotence of organic origin    Hyperglycemia    Mixed hyperlipidemia    Essential hypertension    Smoker unmotivated to quit    Asbestosis ([facility] [address]    Idiopathic gout    Primary osteoarthritis involving multiple joints       Outpatient Medications Marked as Taking for the 10/6/21 encounter (Office Visit) with Bettyjo Gottron, MD   Medication Sig Dispense Refill    HYDROcodone-acetaminophen (NORCO) 5-325 MG per tablet Take 1 tablet by mouth every 8 hours as needed for Pain for up to 30 days.  90 tablet 0    atorvastatin (LIPITOR) 20 MG tablet TAKE 1 TABLET DAILY 90 tablet 1    lisinopril-hydroCHLOROthiazide (PRINZIDE;ZESTORETIC) 20-12.5 MG per tablet TAKE 1 TABLET TWICE A  tablet 1    colchicine (COLCRYS) 0.6 MG tablet Take 1 tablet by mouth 2 times daily as needed for Pain (gout) 60 tablet 2       No Known Allergies    Social History     Tobacco Use    Smoking status: Current Every Day Smoker     Packs/day: 0.75     Years: 53.00     Pack years: 39.75     Types: Cigarettes    Smokeless tobacco: Never Used   Substance Use Topics    Alcohol use: Yes     Comment: occasional alcohol use       /74 (Site: Right Upper Arm, Position: Sitting, Cuff Size: Medium Adult)   Pulse 90   Temp 97.4 °F (36.3 °C) (Infrared)   Resp 12   Ht 5' 10\" (1.778 m)   Wt 179 lb 6 oz (81.4 kg)   SpO2 93%   BMI 25.74 kg/m²     Objective:   Physical Exam  Constitutional:       General: He is not in acute distress. Appearance: He is well-developed. Neck:      Vascular: No carotid bruit. Cardiovascular:      Rate and Rhythm: Normal rate and regular rhythm. Pulses:           Dorsalis pedis pulses are 2+ on the right side and 2+ on the left side. Posterior tibial pulses are 2+ on the right side and 2+ on the left side. Heart sounds: Normal heart sounds. No murmur heard. No friction rub. No gallop. Pulmonary:      Effort: Pulmonary effort is normal.      Breath sounds: Rhonchi present. No decreased breath sounds or wheezing. Musculoskeletal:         General: Normal range of motion. Lumbar back: Spasms (Left lumbar) present. No deformity or tenderness. Right knee: Normal range of motion. Left knee: Deformity (crepitus) present. No swelling. Normal range of motion. Right lower leg: No edema. Left lower leg: No edema. Comments: Hands with enlargement of the MP joints of multiple fingers. Skin:     Findings: No rash. Neurological:      Mental Status: He is alert and oriented to person, place, and time. Sensory: Sensation is intact. No sensory deficit. Motor: Motor function is intact. Psychiatric:         Behavior: Behavior normal. Behavior is cooperative. Assessment:      Romayne Canter was seen today for follow-up and hypertension. Diagnoses and all orders for this visit:    Essential hypertension  -     lisinopril-hydroCHLOROthiazide (PRINZIDE;ZESTORETIC) 20-12.5 MG per tablet; TAKE 1 TABLET TWICE A DAY    Encounter for screening for lung cancer  -     Cancel: CT Lung Screen (Initial or Annual);  Future    Hyperglycemia    Mixed hyperlipidemia    Smoker unmotivated to quit    Personal history of tobacco use  -     Cancel: [place] VISIT TO DISCUSS LUNG CA SCREEN W LDCT  -     CT Lung Screen (Annual); Future    Primary osteoarthritis involving multiple joints    Other orders  -     atorvastatin (LIPITOR) 20 MG tablet; TAKE 1 TABLET DAILY    OARRS report checked          Plan:      Reviewed labs and test results with patient. Maintain current medications    Proceed with lung CT scan    Patient received counseling on the following healthy behaviors: nutrition and exercise and stopping smoking    Patient given educational materials     Health maintenance updated    Discussed use, benefit, and side effects of prescribed medications. Barriers to medication compliance addressed. All patient questions answered. Pt voiced understanding. Patient needs RTC in 3 months. Medical decision making of moderate complexity. Please note that this chart was generated using Dragon dictation software. Although every effort was made to ensure the accuracy of this automated transcription, some errors in transcription may have occurred.

## 2021-10-07 DIAGNOSIS — I10 ESSENTIAL HYPERTENSION: ICD-10-CM

## 2021-10-07 RX ORDER — LISINOPRIL AND HYDROCHLOROTHIAZIDE 20; 12.5 MG/1; MG/1
TABLET ORAL
Qty: 180 TABLET | Refills: 1 | Status: SHIPPED | OUTPATIENT
Start: 2021-10-07 | End: 2022-04-05

## 2021-10-07 RX ORDER — ATORVASTATIN CALCIUM 20 MG/1
TABLET, FILM COATED ORAL
Qty: 90 TABLET | Refills: 1 | Status: SHIPPED | OUTPATIENT
Start: 2021-10-07 | End: 2022-04-05

## 2021-10-07 NOTE — TELEPHONE ENCOUNTER
Medication:   Requested Prescriptions     Pending Prescriptions Disp Refills    lisinopril-hydroCHLOROthiazide (PRINZIDE;ZESTORETIC) 20-12.5 MG per tablet 180 tablet 1     Sig: TAKE 1 TABLET TWICE A DAY    atorvastatin (LIPITOR) 20 MG tablet 90 tablet 1     Sig: TAKE 1 TABLET DAILY      Provider out of office    Patient Phone Number: [phone] (home) [phone] (work)    Last appt: 10/6/2021   Next appt: 1/11/2022    Last OARRS:   RX Monitoring 4/9/2019   Attestation The Prescription Monitoring Report for this patient was reviewed today.    Periodic Controlled Substance Monitoring -     PDMP Monitoring:    Last PDMP Roby Slater as Reviewed [facility]):  Review User Review Instant Review Result   Kady [place] 7/6/2021  9:35 AM Reviewed PDMP [1]     Preferred Pharmacy:   [address] [place] [address] Drive [address] [facility], Eligio Hylton [facility] [phone] Tushar Emerson [phone]  [address] [place] [address]  Phone: [phone] Fax: [phone] - Afafhcisj, [address] [phone] - f [phone]  [place] [address]  Phone: [phone] Fax: Marcia Herndon [facility], [address] [facility] 800 [facility] [address]  [address] [place] [address]  Phone: [phone] Fax: [phone]

## 2021-10-07 NOTE — TELEPHONE ENCOUNTER
atorvastatin (LIPITOR) 20 MG tablet [[id number]]    lisinopril-hydroCHLOROthiazide (PRINZIDE;ZESTORETIC) 20-12.5 MG per tablet [[id number]]    [facility]  [place], [place] Aaron landin [phone] F [phone]    They were sent to the wrong pharmacy

## 2021-10-27 DIAGNOSIS — M15.9 PRIMARY OSTEOARTHRITIS INVOLVING MULTIPLE JOINTS: ICD-10-CM

## 2021-10-27 RX ORDER — HYDROCODONE BITARTRATE AND ACETAMINOPHEN 5; 325 MG/1; MG/1
1 TABLET ORAL EVERY 8 HOURS PRN
Qty: 90 TABLET | Refills: 0 | Status: SHIPPED | OUTPATIENT
Start: 2021-10-27 | End: 2021-11-24 | Stop reason: SDUPTHER

## 2021-10-27 NOTE — TELEPHONE ENCOUNTER
Medication:   Requested Prescriptions     Pending Prescriptions Disp Refills    HYDROcodone-acetaminophen (NORCO) 5-325 MG per tablet 90 tablet 0     Sig: Take 1 tablet by mouth every 8 hours as needed for Pain for up to 30 days. Last Filled:  9/28/2021    Patient Phone Number: [phone] (home) [phone] (work)    Last appt: 10/6/2021   Next appt: 1/11/2022    Last OARRS:   RX Monitoring 4/9/2019   Attestation The Prescription Monitoring Report for this patient was reviewed today.    Periodic Controlled Substance Monitoring -     PDMP Monitoring:    Last PDMP Tracy Lunsford as Reviewed [facility]):  Review User Review Instant Review Result   Delroy Stahl 7/6/2021  9:35 AM Reviewed PDMP [1]     Preferred Pharmacy:   [address] [place] [address] Drive [address] [facility] Eligio Jimenez [facility] [phone] Sadaf Gan [phone]  [address] [place] [address]  Phone: [phone] Fax: [phone] - Aejzq Wyd, [address] [phone] - f [phone]  [place] [address]  Phone: [phone] Fax: Gemma Rinaldi [address], [address] [facility] 800 [facility] [address]  [address] [facility] [address]  [place] [address]  Phone: [phone] Fax: [phone]

## 2021-10-27 NOTE — TELEPHONE ENCOUNTER
HYDROcodone-acetaminophen (NORCO) 5-325 MG per tablet 90 tablet 0 9/28/2021 10/28/2021    Sig - Route:  Take 1 tablet by mouth every 8 hours as needed for Pain for up to 30 days. - Oral        [facility] in chart

## 2021-11-24 DIAGNOSIS — M15.9 PRIMARY OSTEOARTHRITIS INVOLVING MULTIPLE JOINTS: ICD-10-CM

## 2021-11-24 RX ORDER — HYDROCODONE BITARTRATE AND ACETAMINOPHEN 5; 325 MG/1; MG/1
1 TABLET ORAL EVERY 8 HOURS PRN
Qty: 90 TABLET | Refills: 0 | Status: SHIPPED | OUTPATIENT
Start: 2021-11-24 | End: 2021-12-22 | Stop reason: SDUPTHER

## 2021-11-24 NOTE — TELEPHONE ENCOUNTER
HYDROcodone-acetaminophen (Fredy Leader) 5-325 MG per tablet [[id number]]    [facility] [address], Eligio Hylton [address] [phone] Judy King [phone]   [address] [place], [address]   Phone:  [phone]  Fax:  [phone]

## 2021-11-24 NOTE — TELEPHONE ENCOUNTER
Medication:   Requested Prescriptions     Pending Prescriptions Disp Refills    HYDROcodone-acetaminophen (NORCO) 5-325 MG per tablet 90 tablet 0     Sig: Take 1 tablet by mouth every 8 hours as needed for Pain for up to 30 days. Last Filled:  10/27/2021    Patient Phone Number: [phone] (home) [phone] (work)    Last appt: 10/6/2021   Next appt: 1/11/2022    Last OARRS:   RX Monitoring 4/9/2019   Attestation The Prescription Monitoring Report for this patient was reviewed today.    Periodic Controlled Substance Monitoring -     PDMP Monitoring:    Last PDMP Nonnie Kocher as Reviewed [facility]):  Review User Review Instant Review Result   Zhanna Curtis 7/6/2021  9:35 AM Reviewed PDMP [1]     Preferred Pharmacy:   [facility] [address] [facility] [phone] [facility] [phone]  [address] [place] [address]  Phone: [phone] Fax: [phone] - [facility], [address] [phone] -  [phone]  [facility] [address]  Phone: [phone] Fax: Marin León [address], [address] [facility] 800 [facility] [address]  8571 [facility]  Estrella Staples [address]  Phone: [phone] Fax: [phone]

## 2021-12-22 DIAGNOSIS — M15.9 PRIMARY OSTEOARTHRITIS INVOLVING MULTIPLE JOINTS: ICD-10-CM

## 2021-12-22 RX ORDER — HYDROCODONE BITARTRATE AND ACETAMINOPHEN 5; 325 MG/1; MG/1
1 TABLET ORAL EVERY 8 HOURS PRN
Qty: 90 TABLET | Refills: 0 | Status: SHIPPED | OUTPATIENT
Start: 2021-12-22 | End: 2022-01-11 | Stop reason: SDUPTHER

## 2021-12-22 NOTE — TELEPHONE ENCOUNTER
Medication:   Requested Prescriptions     Pending Prescriptions Disp Refills    HYDROcodone-acetaminophen (NORCO) 5-325 MG per tablet 90 tablet 0     Sig: Take 1 tablet by mouth every 8 hours as needed for Pain for up to 30 days. Last Filled:  11/24/2021    Patient Phone Number: [phone] (home) [phone] (work)    Last appt: 10/6/2021   Next appt: 1/11/2022    Last OARRS:   RX Monitoring 4/9/2019   Attestation The Prescription Monitoring Report for this patient was reviewed today.    Periodic Controlled Substance Monitoring -     PDMP Monitoring:    Last PDMP Tracy Lunsford as Reviewed [facility]):  Review User Review Instant Review Result   Delroy Stahl 7/6/2021  9:35 AM Reviewed PDMP [1]     Preferred Pharmacy:   [address] [place] [address] Drive [address] [facility] Eligio Jimenez [facility] [phone] Sadaf Gan [phone]  [address] [place] [address]  Phone: [phone] Fax: [phone] [address] [place] [phone] -  [phone]  [place] [address]  Phone: [phone] Fax: Gemma Rinaldi [address], [address] [facility] 800 [facility] [address]  [address] [facility] [address]  [address] [place] [address]  Phone: [phone] Fax: [phone]

## 2021-12-22 NOTE — TELEPHONE ENCOUNTER
Patient is calling to request his refill on his HYDROcodone-acetaminophen (NORCO) 5-325 MG per tablet. Last OV: 10-6-21  Next OV: 1-11-22    Ph[facility] is [facility] [address].

## 2022-01-11 ENCOUNTER — OFFICE VISIT (OUTPATIENT)
Dept: FAMILY MEDICINE CLINIC | Age: 72
End: 2022-01-11
Payer: MEDICARE

## 2022-01-11 VITALS
DIASTOLIC BLOOD PRESSURE: 70 MMHG | OXYGEN SATURATION: 97 % | TEMPERATURE: 97.3 F | RESPIRATION RATE: 12 BRPM | SYSTOLIC BLOOD PRESSURE: 116 MMHG | WEIGHT: 186.13 LBS | HEART RATE: 94 BPM | BODY MASS INDEX: 26.71 KG/M2

## 2022-01-11 DIAGNOSIS — J61 ASBESTOSIS (HCC): ICD-10-CM

## 2022-01-11 DIAGNOSIS — M15.9 PRIMARY OSTEOARTHRITIS INVOLVING MULTIPLE JOINTS: ICD-10-CM

## 2022-01-11 DIAGNOSIS — F17.200 SMOKER UNMOTIVATED TO QUIT: ICD-10-CM

## 2022-01-11 DIAGNOSIS — Z00.00 ROUTINE GENERAL MEDICAL EXAMINATION AT A HEALTH CARE FACILITY: Primary | ICD-10-CM

## 2022-01-11 PROCEDURE — G0439 PPPS, SUBSEQ VISIT: HCPCS | Performed by: FAMILY MEDICINE

## 2022-01-11 RX ORDER — HYDROCODONE BITARTRATE AND ACETAMINOPHEN 5; 325 MG/1; MG/1
1 TABLET ORAL EVERY 8 HOURS PRN
Qty: 90 TABLET | Refills: 0 | Status: SHIPPED | OUTPATIENT
Start: 2022-01-11 | End: 2022-02-17 | Stop reason: SDUPTHER

## 2022-01-11 ASSESSMENT — LIFESTYLE VARIABLES
HOW OFTEN DURING THE LAST YEAR HAVE YOU NEEDED AN ALCOHOLIC DRINK FIRST THING IN THE MORNING TO GET YOURSELF GOING AFTER A NIGHT OF HEAVY DRINKING: 0
HOW OFTEN DURING THE LAST YEAR HAVE YOU FAILED TO DO WHAT WAS NORMALLY EXPECTED FROM YOU BECAUSE OF DRINKING: 0
HOW OFTEN DO YOU HAVE SIX OR MORE DRINKS ON ONE OCCASION: 0
AUDIT-C TOTAL SCORE: 5
HOW OFTEN DURING THE LAST YEAR HAVE YOU BEEN UNABLE TO REMEMBER WHAT HAPPENED THE NIGHT BEFORE BECAUSE YOU HAD BEEN DRINKING: 0
HAS A RELATIVE, FRIEND, DOCTOR, OR ANOTHER HEALTH PROFESSIONAL EXPRESSED CONCERN ABOUT YOUR DRINKING OR SUGGESTED YOU CUT DOWN: 0
HAVE YOU OR SOMEONE ELSE BEEN INJURED AS A RESULT OF YOUR DRINKING: 0
HOW OFTEN DURING THE LAST YEAR HAVE YOU HAD A FEELING OF GUILT OR REMORSE AFTER DRINKING: 0
AUDIT TOTAL SCORE: 5
HOW OFTEN DO YOU HAVE A DRINK CONTAINING ALCOHOL: 4
HOW MANY STANDARD DRINKS CONTAINING ALCOHOL DO YOU HAVE ON A TYPICAL DAY: 1
HOW OFTEN DURING THE LAST YEAR HAVE YOU FOUND THAT YOU WERE NOT ABLE TO STOP DRINKING ONCE YOU HAD STARTED: 0

## 2022-01-11 ASSESSMENT — PATIENT HEALTH QUESTIONNAIRE - PHQ9
SUM OF ALL RESPONSES TO PHQ9 QUESTIONS 1 & 2: 0
SUM OF ALL RESPONSES TO PHQ QUESTIONS 1-9: 0
SUM OF ALL RESPONSES TO PHQ QUESTIONS 1-9: 0
2. FEELING DOWN, DEPRESSED OR HOPELESS: 0
SUM OF ALL RESPONSES TO PHQ QUESTIONS 1-9: 0
SUM OF ALL RESPONSES TO PHQ QUESTIONS 1-9: 0
1. LITTLE INTEREST OR PLEASURE IN DOING THINGS: 0

## 2022-01-11 NOTE — PATIENT INSTRUCTIONS
Personalized Preventive Plan for Achilles Hodgkin - 1/11/2022  Medicare offers a range of preventive health benefits. Some of the tests and screenings are paid in full while other may be subject to a deductible, co-insurance, and/or copay. Some of these benefits include a comprehensive review of your medical history including lifestyle, illnesses that may run in your family, and various assessments and screenings as appropriate. After reviewing your medical record and screening and assessments performed today your provider may have ordered immunizations, labs, imaging, and/or referrals for you. A list of these orders (if applicable) as well as your Preventive Care list are included within your After Visit Summary for your review. Other Preventive Recommendations:    · A preventive eye exam performed by an eye specialist is recommended every 1-2 years to screen for glaucoma; cataracts, macular degeneration, and other eye disorders. · A preventive dental visit is recommended every 6 months. · Try to get at least 150 minutes of exercise per week or 10,000 steps per day on a pedometer . · Order or download the FREE \"Exercise & Physical Activity: Your Everyday Guide\" from [employer]. Call 9-636.105.4596 or search [employer] online. · You need 4367-3976 mg of calcium and 3553-6476 IU of vitamin D per day. It is possible to meet your calcium requirement with diet alone, but a vitamin D supplement is usually necessary to meet this goal.  · When exposed to the sun, use a sunscreen that protects against both UVA and UVB radiation with an SPF of 30 or greater. Reapply every 2 to 3 hours or after sweating, drying off with a towel, or swimming. · Always wear a seat belt when traveling in a car. Always wear a helmet when riding a bicycle or motorcycle.

## 2022-01-11 NOTE — PROGRESS NOTES
Medicare Annual Wellness Visit  Name: Sara Reyes Date: 2022   MRN: [id number] Sex: Male   Age: 80 y.o. Ethnicity: Non- / Non    : 1950 Race: White (non-)      Micaela Faustin is here for Medicare AWV, Hypertension, and Hyperglycemia    CC: Patient presents for AMV and follow-up of chronic health problems  Patient continues to have arthritis discovered typically take his pain medication 3 times a day. He still has not performed his lung CT scan with a history of smoking and asbestosis. He states his brother was just recently hospitalized with heart issues. Screenings for behavioral, psychosocial and functional/safety risks, and cognitive dysfunction are all negative except as indicated below. These results, as well as other patient data from the [address] form, are documented in Flowsheets linked to this Encounter. No Known Allergies    Prior to Visit Medications    Medication Sig Taking? Authorizing Provider   HYDROcodone-acetaminophen (NORCO) 5-325 MG per tablet Take 1 tablet by mouth every 8 hours as needed for Pain for up to 30 days. Yes Sarah Shell MD   lisinopril-hydroCHLOROthiazide (PRINZIDE;ZESTORETIC) 20-12.5 MG per tablet TAKE 1 TABLET TWICE A DAY Yes Caleb Scott MD   atorvastatin (LIPITOR) 20 MG tablet TAKE 1 TABLET DAILY Yes Caleb Scott MD   colchicine (COLCRYS) 0.6 MG tablet Take 1 tablet by mouth 2 times daily as needed for Pain (gout) Yes Sarah Shell MD       No past medical history on file.     Past Surgical History:   Procedure Laterality Date    FINGER SURGERY      LT       Family History   Problem Relation Age of Onset    Hypertension Mother     Heart Attack Father         @ 78 yo       CareTeam (Including outside providers/suppliers regularly involved in providing care):   Patient Care Team:  Sarah Shell MD as PCP - General (Family Medicine)  Sarah Shell MD as PCP - [address] [place] [address] Provider    Wt Readings from Last 3 Encounters:   01/11/22 186 lb 2 oz (84.4 kg)   10/06/21 179 lb 6 oz (81.4 kg)   07/06/21 183 lb 12.8 oz (83.4 kg)     Vitals:    01/11/22 1506   BP: 116/70   Site: Right Upper Arm   Position: Sitting   Cuff Size: Large Adult   Pulse: 94   Resp: 12   Temp: 97.3 °F (36.3 °C)   TempSrc: Infrared   SpO2: 97%   Weight: 186 lb 2 oz (84.4 kg)     Body mass index is 26.71 kg/m². Based upon direct observation of the patient, evaluation of cognition reveals recent and remote memory intact. General Appearance: alert and oriented to person, place and time, well-developed and well-nourished, in no acute distress  Neck: neck supple and non tender without mass, no thyromegaly or thyroid nodules, no cervical lymphadenopathy   Pulmonary/Chest: clear to auscultation bilaterally- no wheezes, rales or rhonchi, normal air movement, no respiratory distress  Cardiovascular: normal rate, regular rhythm, normal S1 and S2, no murmurs, no gallops, intact distal pulses and no carotid bruits  Extremities: no edema  Neurologic: no cranial nerve deficit, gait and coordination normal and speech normal    Patient's complete Health Risk Assessment and screening values have been reviewed and are found in Flowsheets. The following problems were reviewed today and where indicated follow up appointments were made and/or referrals ordered. Positive Risk Factor Screenings with Interventions:         Substance History:  Social History     Tobacco History     Smoking Status  Current Every Day Smoker Smoking Frequency  0.75 packs/day for 53 years (39.75 pk yrs) Smoking Tobacco Type  Cigarettes    Smokeless Tobacco Use  Never Used          Alcohol History     Alcohol Use Status  Yes Comment  occasional alcohol use          Drug Use     Drug Use Status  Not Asked          Sexual Activity     Sexually Active  Not Asked               Alcohol Screening:   Audit-C Score: 5  Total Score: 5    A score of 8 or more is associated with harmful or hazardous drinking. A score of 13 or more in women, and 15 or more in men, is likely to indicate alcohol dependence. Substance Abuse Interventions:  · Tobacco abuse:  patient is not ready to work toward tobacco cessation at this time       Opioid Risk: (Low risk score <55)  Opioid risk score: 12    Patient is low risk for opioid use disorder or overdose. Click here to Document Controlled Substance Monitoring. Last PDMP [place] as Reviewed:  Review User Review Instant Review Result   Umu Aviles 7/6/2021  9:35 AM Reviewed PDMP [1]     Last Controlled Substance Monitoring Documentation      Office Visit from 4/9/2019 in [address] [place] [address],[address] The Prescription Monitoring Report for this patient was reviewed today. filed at 04/09/2019 [address] and ACP:  General  In general, how would you say your health is?: Good  In the past 7 days, have you experienced any of the following?  New or Increased Pain, New or Increased Fatigue, Loneliness, Social Isolation, Stress or Anger?: None of These  Do you get the social and emotional support that you need?: Yes  Do you have a Living Will?: Yes  Advance Directives     Power of [address] [place] [address] Will ACP-Advance Directive ACP-Power of     Not on File Not on File Not on File Not on File      General Health Risk Interventions:  · Patient was encouraged bring copy Kory to the office    Health Habits/Nutrition:  Health Habits/Nutrition  Do you exercise for at least 20 minutes 2-3 times per week?: (!) No  Have you lost any weight without trying in the past 3 months?: No  Do you eat only one meal per day?: No  Have you seen the dentist within the past year?: Yes     Health Habits/Nutrition Interventions:  · Inadequate physical activity:  patient is not ready to increase his/her physical activity level at this time         Personalized Preventive Plan   Current Health Maintenance Status  Immunization History   Administered Date(s) Administered    COVID-19, Moderna, Primary or Immunocompromised, PF, 100mcg/0.5mL 03/16/2021, 04/13/2021    Influenza, High Dose (Fluzone 65 yrs and older) 10/23/2017, 10/16/2018    Influenza, Intradermal, Preservative free 10/15/2015    Influenza, Quadv, adjuvanted, 65 yrs +, IM, PF (Fluad) 10/02/2020    Influenza, Triv, inactivated, subunit, adjuvanted, IM (Fluad 65 yrs and older) 10/09/2019    Pneumococcal Conjugate 13-valent ([id number]) 04/21/2017    Pneumococcal Polysaccharide ([id number]) 04/23/2018        Health Maintenance   Topic Date Due    AAA screen  Never done    Hepatitis C screen  Never done    Low dose CT lung screening  Never done   [employer] Visit (AWV)  Never done    Flu vaccine (1) 09/01/2021    COVID-19 Vaccine (3 - Booster for Janell Coup series) 10/13/2021    DTaP/Tdap/Td vaccine (1 - Tdap) 02/01/2022 (Originally 3/12/1969)    Shingles Vaccine (1 of 2) 01/11/2023 (Originally 3/12/2001)    Colon cancer screen colonoscopy  05/16/2022    Depression Screen  07/06/2022    A1C test (Diabetic or Prediabetic)  09/22/2022    Lipid screen  09/22/2022    Potassium monitoring  09/22/2022    Creatinine monitoring  09/22/2022    Pneumococcal 65+ years Vaccine  Completed    Hepatitis A vaccine  Aged Out    Hepatitis B vaccine  Aged Out    Hib vaccine  Aged Out    Meningococcal (ACWY) vaccine  Aged Out     Recommendations for Estech Due: see orders and patient instructions/AVS.  . Recommended screening schedule for the next 5-10 years is provided to the patient in written form: see Patient Jack Lee was seen today for medicare awv, hypertension and hyperglycemia. Diagnoses and all orders for this visit:    Routine general medical examination at a health care facility    Primary osteoarthritis involving multiple joints  -     HYDROcodone-acetaminophen (NORCO) 5-325 MG per tablet;  Take 1 tablet by mouth every 8 hours

## 2022-01-11 NOTE — PROGRESS NOTES
Subjective:      Patient ID: Estefania Cuevas is a 80 y.o. male. HPI pt is here for a 3 month check and an AWV    Review of Systems  Patient Active Problem List   Diagnosis    Impotence of organic origin    Hyperglycemia    Mixed hyperlipidemia    Essential hypertension    Smoker unmotivated to quit    Asbestosis ([facility] [address]    Idiopathic gout    Primary osteoarthritis involving multiple joints       Outpatient Medications Marked as Taking for the 1/11/22 encounter (Office Visit) with Fransisco Vargas MD   Medication Sig Dispense Refill    HYDROcodone-acetaminophen (NORCO) 5-325 MG per tablet Take 1 tablet by mouth every 8 hours as needed for Pain for up to 30 days.  90 tablet 0    lisinopril-hydroCHLOROthiazide (PRINZIDE;ZESTORETIC) 20-12.5 MG per tablet TAKE 1 TABLET TWICE A  tablet 1    atorvastatin (LIPITOR) 20 MG tablet TAKE 1 TABLET DAILY 90 tablet 1    colchicine (COLCRYS) 0.6 MG tablet Take 1 tablet by mouth 2 times daily as needed for Pain (gout) 60 tablet 2       No Known Allergies    Social History     Tobacco Use    Smoking status: Current Every Day Smoker     Packs/day: 0.75     Years: 53.00     Pack years: 39.75     Types: Cigarettes    Smokeless tobacco: Never Used   Substance Use Topics    Alcohol use: Yes     Comment: occasional alcohol use       /70 (Site: Right Upper Arm, Position: Sitting, Cuff Size: Large Adult)   Pulse 94   Temp 97.3 °F (36.3 °C) (Infrared)   Resp 12   Wt 186 lb 2 oz (84.4 kg)   SpO2 97%   BMI 26.71 kg/m²     Objective:   Physical Exam    Assessment:      ***      Plan:      ***

## 2022-01-20 ENCOUNTER — TELEPHONE (OUTPATIENT)
Dept: FAMILY MEDICINE CLINIC | Age: 72
End: 2022-01-20

## 2022-01-20 DIAGNOSIS — M15.9 PRIMARY OSTEOARTHRITIS INVOLVING MULTIPLE JOINTS: ICD-10-CM

## 2022-01-20 NOTE — TELEPHONE ENCOUNTER
PT is requesting a refill on HYDROcodone-acetaminophen (NORCO) 5-325 MG per tablet to please be called into [facility] [address], [address] [place] [address]

## 2022-02-17 DIAGNOSIS — M15.9 PRIMARY OSTEOARTHRITIS INVOLVING MULTIPLE JOINTS: ICD-10-CM

## 2022-02-17 RX ORDER — HYDROCODONE BITARTRATE AND ACETAMINOPHEN 5; 325 MG/1; MG/1
1 TABLET ORAL EVERY 8 HOURS PRN
Qty: 90 TABLET | Refills: 0 | Status: SHIPPED | OUTPATIENT
Start: 2022-02-17 | End: 2022-03-21 | Stop reason: SDUPTHER

## 2022-02-17 NOTE — TELEPHONE ENCOUNTER
HYDROcodone-acetaminophen (NORCO) 5-325 MG per tablet 90 tablet 0 1/11/2022 2/10/2022    Sig - Route: Take 1 tablet by mouth every 8 hours as needed for Pain for up to 30 days.  -             Mirtha      Provider out of the office

## 2022-02-28 DIAGNOSIS — M10.9 ACUTE GOUT INVOLVING TOE OF RIGHT FOOT, UNSPECIFIED CAUSE: ICD-10-CM

## 2022-02-28 RX ORDER — COLCHICINE 0.6 MG/1
TABLET, FILM COATED ORAL
Qty: 60 TABLET | Refills: 2 | Status: SHIPPED | OUTPATIENT
Start: 2022-02-28 | End: 2022-07-20 | Stop reason: SDUPTHER

## 2022-02-28 NOTE — TELEPHONE ENCOUNTER
Medication:   Requested Prescriptions     Pending Prescriptions Disp Refills    COLCRYS 0.6 MG tablet [Pharmacy Med Name: COLCRYS 0.6 MG TABLET] 60 tablet 2     Sig: TAKE ONE TABLET BY MOUTH TWICE A DAY AS NEEDED FOR PAIN (GOUT)      Last Filled:      Patient Phone Number: [phone] (home) [phone] (work)    Last appt: 1/11/2022   Next appt: 4/19/2022    Last OARRS:   RX Monitoring 4/9/2019   Attestation The Prescription Monitoring Report for this patient was reviewed today.    Periodic Controlled Substance Monitoring -     PDMP Monitoring:    Last PDMP Tonia Bryant as Reviewed [facility]):  Review User Review Instant Review Result   Guillermo Speaks 1/11/2022  3:12 PM Reviewed PDMP [1]     Preferred Pharmacy:   [employer] [facility] Tani Eligio Jimenez3 [phone] Ulises Topete [phone]  [address] [address]  Phone: [phone] Fax: [phone]

## 2022-03-21 ENCOUNTER — TELEPHONE (OUTPATIENT)
Dept: FAMILY MEDICINE CLINIC | Age: 72
End: 2022-03-21

## 2022-03-21 DIAGNOSIS — M15.9 PRIMARY OSTEOARTHRITIS INVOLVING MULTIPLE JOINTS: ICD-10-CM

## 2022-03-21 RX ORDER — HYDROCODONE BITARTRATE AND ACETAMINOPHEN 5; 325 MG/1; MG/1
1 TABLET ORAL EVERY 8 HOURS PRN
Qty: 90 TABLET | Refills: 0 | Status: SHIPPED | OUTPATIENT
Start: 2022-03-21 | End: 2022-04-19 | Stop reason: SDUPTHER

## 2022-03-21 NOTE — TELEPHONE ENCOUNTER
See telephone encounter from 3/21/22 medication has been pended to Dr. Az Salazar to send to the pharmacy.

## 2022-03-21 NOTE — TELEPHONE ENCOUNTER
----- Message from Fabiana Mayer sent at 3/19/2022 10:21 AM EDT -----  Subject: Refill Request    QUESTIONS  Name of Medication? HYDROcodone-acetaminophen (NORCO) 5-325 MG per tablet  Patient-reported dosage and instructions? 5-325 MG take 3 a day as needed  How many days do you have left? 1  Preferred Pharmacy? [address]  Pharmacy phone number (if available)? [phone]  ---------------------------------------------------------------------------  --------------  Unknown Dura INFO  What is the best way for the office to contact you? OK to leave message on   voicemail  Preferred Call Back Phone Number?  [phone]

## 2022-03-21 NOTE — TELEPHONE ENCOUNTER
Medication:   Requested Prescriptions     Pending Prescriptions Disp Refills    HYDROcodone-acetaminophen (NORCO) 5-325 MG per tablet 90 tablet 0     Sig: Take 1 tablet by mouth every 8 hours as needed for Pain for up to 30 days. Last Filled:  2/17/2022    Patient Phone Number: [phone] (home) [phone] (work)    Last appt: 1/11/2022   Next appt: 4/19/2022    Last OARRS:   RX Monitoring 4/9/2019   Attestation The Prescription Monitoring Report for this patient was reviewed today.    Periodic Controlled Substance Monitoring -     PDMP Monitoring:    Last PDMP [facility] as Reviewed [facility]):  Review User Review Instant Review Result   Live Pipers 1/11/2022  3:12 PM Reviewed PDMP [1]     Preferred Pharmacy:   [facility] [address] [facility] Eligio Jimenez [facility] [phone] Sharlett Duel [phone]  [address] [place] [address]  Phone: [phone] Fax: [phone] - [facility], [address] [phone] - f [phone]  [address] [place] [address]  Phone: [phone] Fax: Ling Valdes [address], [address] [facility] [address] [facility] [address]  [address] [facility] Stefano Callejas [address]  Phone: [phone] Fax: [phone]

## 2022-03-21 NOTE — TELEPHONE ENCOUNTER
PT is requesting a refill on HYDROcodone-acetaminophen (NORCO) 5-325 MG per tablet to please be called into [facility] [address], [address] [address]

## 2022-04-05 DIAGNOSIS — I10 ESSENTIAL HYPERTENSION: ICD-10-CM

## 2022-04-05 RX ORDER — ATORVASTATIN CALCIUM 20 MG/1
TABLET, FILM COATED ORAL
Qty: 90 TABLET | Refills: 1 | Status: SHIPPED | OUTPATIENT
Start: 2022-04-05 | End: 2022-10-04

## 2022-04-05 RX ORDER — LISINOPRIL AND HYDROCHLOROTHIAZIDE 20; 12.5 MG/1; MG/1
TABLET ORAL
Qty: 180 TABLET | Refills: 1 | Status: SHIPPED | OUTPATIENT
Start: 2022-04-05 | End: 2022-10-04

## 2022-04-05 NOTE — TELEPHONE ENCOUNTER
Medication:   Requested Prescriptions     Pending Prescriptions Disp Refills    lisinopril-hydroCHLOROthiazide (PRINZIDE;ZESTORETIC) 20-12.5 MG per tablet [Pharmacy Med Name: LISINOPRIL/HCTZ TABS 20/12.5MG] 180 tablet 3     Sig: TAKE 1 TABLET TWICE A DAY    atorvastatin (LIPITOR) 20 MG tablet [Pharmacy Med Name: ATORVASTATIN TABS 20MG] 90 tablet 3     Sig: TAKE 1 TABLET DAILY          Patient Phone Number: [phone] (home) [phone] (work)    Last appt: 1/11/2022   Next appt: 4/19/2022    Last OARRS:   RX Monitoring 4/9/2019   Attestation The Prescription Monitoring Report for this patient was reviewed today.    Periodic Controlled Substance Monitoring -     PDMP Monitoring:    Last PDMP Koko Licona as Reviewed [facility]):  Review User Review Instant Review Result   Umesh Cohen 1/11/2022  3:12 PM Reviewed PDMP [1]     Preferred Pharmacy:   [address] Eligio Jimenez [facility] [phone] [facility] [phone]  [address] [place] [address]  Phone: [phone] Fax: [phone] - [address] [phone] -  [phone]  [address] [address]  Phone: [phone] Fax: [phone]    [facility] [id number] [address]  Phone: [phone] Fax: [phone]

## 2022-04-19 ENCOUNTER — OFFICE VISIT (OUTPATIENT)
Dept: FAMILY MEDICINE CLINIC | Age: 72
End: 2022-04-19
Payer: MEDICARE

## 2022-04-19 VITALS
BODY MASS INDEX: 25.97 KG/M2 | HEART RATE: 86 BPM | TEMPERATURE: 97.3 F | SYSTOLIC BLOOD PRESSURE: 138 MMHG | DIASTOLIC BLOOD PRESSURE: 82 MMHG | WEIGHT: 181 LBS | OXYGEN SATURATION: 98 %

## 2022-04-19 DIAGNOSIS — M10.00 IDIOPATHIC GOUT, UNSPECIFIED CHRONICITY, UNSPECIFIED SITE: ICD-10-CM

## 2022-04-19 DIAGNOSIS — F17.200 SMOKER UNMOTIVATED TO QUIT: ICD-10-CM

## 2022-04-19 DIAGNOSIS — M15.9 PRIMARY OSTEOARTHRITIS INVOLVING MULTIPLE JOINTS: Primary | ICD-10-CM

## 2022-04-19 PROCEDURE — 99214 OFFICE O/P EST MOD 30 MIN: CPT | Performed by: FAMILY MEDICINE

## 2022-04-19 RX ORDER — HYDROCODONE BITARTRATE AND ACETAMINOPHEN 5; 325 MG/1; MG/1
1 TABLET ORAL EVERY 8 HOURS PRN
Qty: 90 TABLET | Refills: 0 | Status: SHIPPED | OUTPATIENT
Start: 2022-04-19 | End: 2022-05-17 | Stop reason: SDUPTHER

## 2022-04-19 NOTE — PROGRESS NOTES
(Site: Left Upper Arm, Position: Sitting, Cuff Size: Medium Adult)   Pulse 86   Temp 97.3 °F (36.3 °C) (Infrared)   Wt 181 lb (82.1 kg)   SpO2 98%   BMI 25.97 kg/m²       Objective:   Physical Exam  Constitutional:       General: He is not in acute distress. Appearance: He is well-developed. Neck:      Vascular: No carotid bruit. Cardiovascular:      Rate and Rhythm: Normal rate and regular rhythm. Pulses:           Dorsalis pedis pulses are 2+ on the right side and 2+ on the left side. Posterior tibial pulses are 2+ on the right side and 2+ on the left side. Heart sounds: Normal heart sounds. No murmur heard. No friction rub. No gallop. Pulmonary:      Effort: Pulmonary effort is normal.      Breath sounds: Rhonchi present. No decreased breath sounds or wheezing. Musculoskeletal:         General: Normal range of motion. Lumbar back: Spasms (Left lumbar) present. No deformity or tenderness. Right knee: Normal range of motion. Left knee: Deformity (crepitus) present. No swelling. Normal range of motion. Right lower leg: No edema. Left lower leg: No edema. Comments: Hands with enlargement of the MP joints of multiple fingers. Skin:     Findings: No rash. Neurological:      Mental Status: He is alert and oriented to person, place, and time. Sensory: Sensation is intact. No sensory deficit. Motor: Motor function is intact. Psychiatric:         Behavior: Behavior normal. Behavior is cooperative. Assessment:      Lelia Grossman was seen today for 3 month follow-up and hypertension. Diagnoses and all orders for this visit:    Primary osteoarthritis involving multiple joints  -     HYDROcodone-acetaminophen (NORCO) 5-325 MG per tablet; Take 1 tablet by mouth every 8 hours as needed for Pain for up to 30 days.     Idiopathic gout, unspecified chronicity, unspecified site    Smoker unmotivated to quit    OARRS report checked    OARRS report checked        Plan:      Strongly encourage patient to proceed with CT scan of the lungs    Discontinue smoking    Discussed limiting lifting of any more than 20 pounds on a repetitive basis for the osteoarthritis. RTC 3 months    Medical decision making of moderate complexity. Please note that this chart was generated using Dragon dictation software. Although every effort was made to ensure the accuracy of this automated transcription, some errors in transcription may have occurred.

## 2022-05-17 DIAGNOSIS — M15.9 PRIMARY OSTEOARTHRITIS INVOLVING MULTIPLE JOINTS: ICD-10-CM

## 2022-05-17 RX ORDER — HYDROCODONE BITARTRATE AND ACETAMINOPHEN 5; 325 MG/1; MG/1
1 TABLET ORAL EVERY 8 HOURS PRN
Qty: 90 TABLET | Refills: 0 | Status: SHIPPED | OUTPATIENT
Start: 2022-05-17 | End: 2022-06-14 | Stop reason: SDUPTHER

## 2022-05-17 NOTE — TELEPHONE ENCOUNTER
HYDROcodone-acetaminophen (NORCO) 5-325 MG per tablet 90 tablet 0 4/19/2022 5/19/2022    Sig - Route:  Take 1 tablet by mouth every 8 hours as needed for Pain for up to 30 days. - [facility] [address] [place], [place] - [facility] [address] - F [phone]

## 2022-05-17 NOTE — TELEPHONE ENCOUNTER
Medication:   Requested Prescriptions     Pending Prescriptions Disp Refills    HYDROcodone-acetaminophen (NORCO) 5-325 MG per tablet 90 tablet 0     Sig: Take 1 tablet by mouth every 8 hours as needed for Pain for up to 30 days. Last Filled:  4/19/22    Patient Phone Number: [phone] (home)     Last appt: 4/19/2022   Next appt: 7/20/2022    Last OARRS:   RX Monitoring 4/9/2019   Attestation The Prescription Monitoring Report for this patient was reviewed today.    Periodic Controlled Substance Monitoring -     PDMP Monitoring:    Last PDMP [place] as Reviewed [facility]):  Review User Review Instant Review Result   Leilani Terrell 4/19/2022  1:10 PM Reviewed PDMP [1]     [facility] Pharmacy:     [facility]  [address] [facility], [address] [place] [address] ([facility])  [address] [address]  Phone: [phone] Fax: [phone]

## 2022-06-14 DIAGNOSIS — M15.9 PRIMARY OSTEOARTHRITIS INVOLVING MULTIPLE JOINTS: ICD-10-CM

## 2022-06-14 RX ORDER — HYDROCODONE BITARTRATE AND ACETAMINOPHEN 5; 325 MG/1; MG/1
1 TABLET ORAL EVERY 8 HOURS PRN
Qty: 90 TABLET | Refills: 0 | Status: SHIPPED | OUTPATIENT
Start: 2022-06-14 | End: 2022-07-12 | Stop reason: SDUPTHER

## 2022-06-14 NOTE — TELEPHONE ENCOUNTER
Disp Refills Start End    HYDROcodone-acetaminophen (NORCO) 5-325 MG per tablet 90 tablet 0 5/17/2022 6/16/2022    Sig - Route:  Take 1 tablet by mouth every 8 hours as needed for Pain for up to 30 days. - Oral    Sent to pharmacy as: HYDROcodone-Acetaminophen 5-325 MG Oral Tablet [facility]            Pharmacy    [facility] [address] [place] [address] [phone] [facility] [phone] [address] [address]   Phone:  [phone]  Fax:  [phone]

## 2022-06-14 NOTE — TELEPHONE ENCOUNTER
Medication:   Requested Prescriptions     Pending Prescriptions Disp Refills    HYDROcodone-acetaminophen (NORCO) 5-325 MG per tablet 90 tablet 0     Sig: Take 1 tablet by mouth every 8 hours as needed for Pain for up to 30 days. Last Filled:  5/17/22    Patient Phone Number: [phone] (home)     Last appt: 4/19/2022   Next appt: 7/20/2022    Last OARRS:   RX Monitoring 4/9/2019   Attestation The Prescription Monitoring Report for this patient was reviewed today. Periodic Controlled Substance Monitoring -     PDMP Monitoring:    Last PDMP Claudia Garcia as Reviewed [facility]):  Review User Review Instant Review Result   Silvia Brarek 4/19/2022  1:10 PM Reviewed PDMP [1]     Preferred Pharmacy: .     [address], [address] [place] [address] ([facility])  [address]  Phone: [phone] Fax: [phone]

## 2022-07-12 DIAGNOSIS — M15.9 PRIMARY OSTEOARTHRITIS INVOLVING MULTIPLE JOINTS: ICD-10-CM

## 2022-07-12 RX ORDER — HYDROCODONE BITARTRATE AND ACETAMINOPHEN 5; 325 MG/1; MG/1
1 TABLET ORAL EVERY 8 HOURS PRN
Qty: 90 TABLET | Refills: 0 | Status: SHIPPED | OUTPATIENT
Start: 2022-07-12 | End: 2022-08-11 | Stop reason: SDUPTHER

## 2022-07-12 NOTE — TELEPHONE ENCOUNTER
HYDROcodone-acetaminophen (NORCO) 5-325 MG per tablet [[id number]]     Order Details  Dose: 1 tablet Route: Oral Frequency: EVERY 8 HOURS PRN for Pain   Dispense Quantity: 90 tablet Refills: 0    Note to Pharmacy: Reduce doses taken as pain becomes manageable         Sig: Take 1 tablet by mouth every 8 hours as needed for Pain for up to 30 days.      Pharmacy    [facility] [address] [address] [address] [phone] [facility] [phone] [address] [facility], [address]   Phone:  [phone]  Fax:  [phone]

## 2022-07-12 NOTE — TELEPHONE ENCOUNTER
Medication:   Requested Prescriptions     Pending Prescriptions Disp Refills    HYDROcodone-acetaminophen (NORCO) 5-325 MG per tablet 90 tablet 0     Sig: Take 1 tablet by mouth every 8 hours as needed for Pain for up to 30 days. Last Filled:  6/14/2022    Patient Phone Number: [phone] (home)     Last appt: 4/19/2022   Next appt: 7/20/2022    Last OARRS:   RX Monitoring 4/9/2019   Attestation The Prescription Monitoring Report for this patient was reviewed today.    Periodic Controlled Substance Monitoring -     PDMP Monitoring:    Last PDMP Marylee Anibal as Reviewed [facility]):  Review User Review Instant Review Result   Nidia Armani 4/19/2022  1:10 PM Reviewed PDMP [1]     Preferred Pharmacy:   [facility] [address] [place] [address] [address]  [facility] 11 [address]  Phone: [phone] Fax: [phone] [address] [phone] [facility] [phone]  [address] [place] [address]  Phone: [phone] Fax: [phone]    [facility] [id number] [address]  Phone: [phone] Fax: [phone]

## 2022-07-20 ENCOUNTER — OFFICE VISIT (OUTPATIENT)
Dept: FAMILY MEDICINE CLINIC | Age: 72
End: 2022-07-20
Payer: MEDICARE

## 2022-07-20 VITALS
BODY MASS INDEX: 25.99 KG/M2 | OXYGEN SATURATION: 97 % | WEIGHT: 181.13 LBS | HEART RATE: 80 BPM | DIASTOLIC BLOOD PRESSURE: 60 MMHG | SYSTOLIC BLOOD PRESSURE: 110 MMHG | RESPIRATION RATE: 12 BRPM | TEMPERATURE: 97.4 F

## 2022-07-20 DIAGNOSIS — Z12.5 SCREENING PSA (PROSTATE SPECIFIC ANTIGEN): ICD-10-CM

## 2022-07-20 DIAGNOSIS — F17.200 SMOKER UNMOTIVATED TO QUIT: ICD-10-CM

## 2022-07-20 DIAGNOSIS — M10.9 ACUTE GOUT INVOLVING TOE OF RIGHT FOOT, UNSPECIFIED CAUSE: ICD-10-CM

## 2022-07-20 DIAGNOSIS — M15.9 PRIMARY OSTEOARTHRITIS INVOLVING MULTIPLE JOINTS: Primary | ICD-10-CM

## 2022-07-20 DIAGNOSIS — I10 ESSENTIAL HYPERTENSION: ICD-10-CM

## 2022-07-20 PROCEDURE — 99214 OFFICE O/P EST MOD 30 MIN: CPT | Performed by: FAMILY MEDICINE

## 2022-07-20 PROCEDURE — 1123F ACP DISCUSS/DSCN MKR DOCD: CPT | Performed by: FAMILY MEDICINE

## 2022-07-20 RX ORDER — COLCHICINE 0.6 MG/1
TABLET ORAL
Qty: 60 TABLET | Refills: 2 | Status: SHIPPED | OUTPATIENT
Start: 2022-07-20

## 2022-07-20 NOTE — PROGRESS NOTES
Subjective:      Patient ID: Germaine Pacheco is a 71 y.o. male. CC: Patient presents for re-evaluation of chronic health problems including osteoarthritis, smoker, gout and hypertension. HPI pt is here for a follow up. Patient continues to work part-time and is a more physical symptoms with them. He still taking a pain pill 3 times a day for arthritis with no issues. He continues to be compliant taking blood pressure medication. He states his sister-in-law suddenly  with lung cancer. He did decrease his cigarette smoking down to half a pack a day. Review of Systems  Patient Active Problem List   Diagnosis    Impotence of organic origin    Hyperglycemia    Mixed hyperlipidemia    Essential hypertension    Smoker unmotivated to quit    Asbestosis ([facility] [address]    Idiopathic gout    Primary osteoarthritis involving multiple joints       Outpatient Medications Marked as Taking for the 22 encounter (Office Visit) with Declan Townsend MD   Medication Sig Dispense Refill    HYDROcodone-acetaminophen (NORCO) 5-325 MG per tablet Take 1 tablet by mouth every 8 hours as needed for Pain for up to 30 days.  90 tablet 0    lisinopril-hydroCHLOROthiazide (PRINZIDE;ZESTORETIC) 20-12.5 MG per tablet TAKE 1 TABLET TWICE A  tablet 1    atorvastatin (LIPITOR) 20 MG tablet TAKE 1 TABLET DAILY 90 tablet 1    COLCRYS 0.6 MG tablet TAKE ONE TABLET BY MOUTH TWICE A DAY AS NEEDED FOR PAIN (GOUT) 60 tablet 2       No Known Allergies    Social History     Tobacco Use    Smoking status: Every Day     Packs/day: 0.75     Years: 53.00     Pack years: 39.75     Types: Cigarettes    Smokeless tobacco: Never   Substance Use Topics    Alcohol use: Yes     Comment: occasional alcohol use       /60 (Site: Right Upper Arm, Position: Sitting, Cuff Size: Medium Adult)   Pulse 80   Temp 97.4 °F (36.3 °C) (Infrared)   Resp 12   Wt 181 lb 2 oz (82.2 kg)   SpO2 97%   BMI 25.99 kg/m²      Objective:   Physical Exam  Constitutional:       General: He is not in acute distress. Appearance: He is well-developed. Neck:      Vascular: No carotid bruit. Cardiovascular:      Rate and Rhythm: Normal rate and regular rhythm. Pulses:           Dorsalis pedis pulses are 2+ on the right side and 2+ on the left side. Posterior tibial pulses are 2+ on the right side and 2+ on the left side. Heart sounds: Normal heart sounds. No murmur heard. No friction rub. No gallop. Pulmonary:      Effort: Pulmonary effort is normal.      Breath sounds: Rhonchi present. No decreased breath sounds or wheezing. Musculoskeletal:         General: Normal range of motion. Lumbar back: Spasms (Left lumbar) present. No deformity or tenderness. Right knee: Normal range of motion. Left knee: Deformity (crepitus) present. No swelling. Normal range of motion. Right lower leg: No edema. Left lower leg: No edema. Comments: Hands with enlargement of the MP joints of multiple fingers. Skin:     Findings: No rash. Neurological:      Mental Status: He is alert and oriented to person, place, and time. Sensory: Sensation is intact. No sensory deficit. Motor: Motor function is intact. Psychiatric:         Behavior: Behavior normal. Behavior is cooperative. Assessment:      Kimber Bullock was seen today for hypertension and hyperlipidemia. Diagnoses and all orders for this visit:    Primary osteoarthritis involving multiple joints    Smoker unmotivated to quit    Acute gout involving toe of right foot, unspecified cause  -     colchicine (COLCRYS) 0.6 MG tablet; TAKE ONE TABLET BY MOUTH TWICE A DAY AS NEEDED FOR PAIN (GOUT)    Essential hypertension  -     Comprehensive Metabolic Panel; Future  -     Lipid Panel; Future    Screening PSA (prostate specific antigen)  -     PSA Screening;  Future  OARRS report checked          Plan:      Maintain current medications for blood pressure and cholesterol management    Proceed with laboratory testing    Discussed stopping smoking    Continue with pain medication on as needed basis    RTC 3 months    Medical decision making of moderate complexity. Please note that this chart was generated using Dragon dictation software. Although every effort was made to ensure the accuracy of this automated transcription, some errors in transcription may have occurred.

## 2022-08-09 DIAGNOSIS — M15.9 PRIMARY OSTEOARTHRITIS INVOLVING MULTIPLE JOINTS: ICD-10-CM

## 2022-08-09 NOTE — TELEPHONE ENCOUNTER
HYDROcodone-acetaminophen (NORCO) 5-325 MG per tablet 90 tablet 0 7/12/2022 8/11/2022    Sig - Route:  Take 1 tablet by mouth every 8 hours as needed for Pain for up to 30 days. - Oral     1 [employer] on [address] in chart    Please advise    [phone]

## 2022-08-09 NOTE — TELEPHONE ENCOUNTER
Medication:   Requested Prescriptions      No prescriptions requested or ordered in this encounter      Last Filled:  7/12/2022    Patient Phone Number: [phone] (home)     Last appt: 7/20/2022   Next appt: 10/21/2022    Last OARRS:   RX Monitoring 4/9/2019   Attestation The Prescription Monitoring Report for this patient was reviewed today.    Periodic Controlled Substance Monitoring -     PDMP Monitoring:    Last PDMP [place]  as Reviewed [facility]):  Review User Review Instant Review Result   Dae Ward 7/20/2022  1:08 PM Reviewed PDMP [1]     Preferred Pharmacy:   [address] [address] [address]  [phone] Joelle Rebollar [phone]  [facility] 95 [address]  Phone: [phone] Fax: [phone] [address] [phone] -  [phone]  [address] [place] [address]  Phone: [phone] Fax: [phone]    [facility]  [id number] [facility]  [address] [address]  Phone: [phone] Fax: [phone]

## 2022-08-11 RX ORDER — HYDROCODONE BITARTRATE AND ACETAMINOPHEN 5; 325 MG/1; MG/1
1 TABLET ORAL EVERY 8 HOURS PRN
Qty: 90 TABLET | Refills: 0 | Status: SHIPPED | OUTPATIENT
Start: 2022-08-11 | End: 2022-09-08 | Stop reason: SDUPTHER

## 2022-08-11 NOTE — TELEPHONE ENCOUNTER
Duplicate! HYDROcodone-acetaminophen (NORCO) 5-325 MG per tablet [[id number]]     Order Details  Dose: 1 tablet Route: Oral Frequency: EVERY 8 HOURS PRN for Pain   Dispense Quantity: 90 tablet Refills: 0    Note to Pharmacy: Reduce doses taken as pain becomes manageable         Sig: Take 1 tablet by mouth every 8 hours as needed for Pain for up to 30 days.      Pharmacy    [address], [address] [place] [address])   [address] [place] [address] [address] [address]   Phone:  [phone]  Fax:  [phone]

## 2022-09-08 DIAGNOSIS — M15.9 PRIMARY OSTEOARTHRITIS INVOLVING MULTIPLE JOINTS: ICD-10-CM

## 2022-09-08 RX ORDER — HYDROCODONE BITARTRATE AND ACETAMINOPHEN 5; 325 MG/1; MG/1
1 TABLET ORAL EVERY 8 HOURS PRN
Qty: 90 TABLET | Refills: 0 | Status: SHIPPED | OUTPATIENT
Start: 2022-09-08 | End: 2022-10-07 | Stop reason: SDUPTHER

## 2022-09-08 NOTE — TELEPHONE ENCOUNTER
HYDROcodone-acetaminophen ([address] [facility]) 5-325 MG per tablet [[id number]]     Order Details  Dose: 1 tablet Route: Oral Frequency: EVERY 8 HOURS PRN for Pain   Dispense Quantity: 90 tablet Refills: 0    Note to Pharmacy: Reduce doses taken as pain becomes manageable         Sig: Take 1 tablet by mouth every 8 hours as needed for Pain for up to 30 days     [address] [place] [address], [address] [place] [address])   [address] [place] [address], Stephy Holcomb. [address]   Phone:  [phone]  Fax:  [phone]

## 2022-10-03 DIAGNOSIS — I10 ESSENTIAL HYPERTENSION: ICD-10-CM

## 2022-10-03 NOTE — TELEPHONE ENCOUNTER
Medication:   Requested Prescriptions     Pending Prescriptions Disp Refills    atorvastatin (LIPITOR) 20 MG tablet [Pharmacy Med Name: ATORVASTATIN TABS 20MG] 90 tablet 3     Sig: TAKE 1 TABLET DAILY    lisinopril-hydroCHLOROthiazide (PRINZIDE;ZESTORETIC) 20-12.5 MG per tablet [Pharmacy Med Name: LISINOPRIL/HCTZ TABS 20/12.5MG] 180 tablet 3     Sig: TAKE 1 TABLET TWICE A DAY          Patient Phone Number: [phone] (home)     Last appt: 7/20/2022   Next appt: 10/21/2022    Last OARRS:   RX Monitoring 4/9/2019   Attestation The Prescription Monitoring Report for this patient was reviewed today.    Periodic Controlled Substance Monitoring -     PDMP Monitoring:    Last PDMP Pablo Thorpe as Reviewed [facility]):  Review User Review Instant Review Result   Venkatesh Parker 8/11/2022 12:29 PM Reviewed PDMP [1]     Preferred Pharmacy:   [facility] [address] [place] [address]  [phone] Enid Alter [phone]  [facility] 95 [address]  Phone: [phone] Fax: [phone] - [place], [address] [phone] -  [phone]  [address] [place] [address]  Phone: [phone] Fax: [phone]    [facility] [id number] [address]  Phone: [phone] Fax: [phone]

## 2022-10-04 RX ORDER — ATORVASTATIN CALCIUM 20 MG/1
TABLET, FILM COATED ORAL
Qty: 90 TABLET | Refills: 1 | Status: SHIPPED | OUTPATIENT
Start: 2022-10-04

## 2022-10-04 RX ORDER — LISINOPRIL AND HYDROCHLOROTHIAZIDE 20; 12.5 MG/1; MG/1
TABLET ORAL
Qty: 180 TABLET | Refills: 1 | Status: SHIPPED | OUTPATIENT
Start: 2022-10-04

## 2022-10-07 DIAGNOSIS — M15.9 PRIMARY OSTEOARTHRITIS INVOLVING MULTIPLE JOINTS: ICD-10-CM

## 2022-10-07 RX ORDER — HYDROCODONE BITARTRATE AND ACETAMINOPHEN 5; 325 MG/1; MG/1
1 TABLET ORAL EVERY 8 HOURS PRN
Qty: 90 TABLET | Refills: 0 | Status: SHIPPED | OUTPATIENT
Start: 2022-10-07 | End: 2022-11-04 | Stop reason: SDUPTHER

## 2022-10-07 NOTE — TELEPHONE ENCOUNTER
Pt called requesting refill of hydrocodone-acetaminophen 5-325mg. Please send to [address] in [facility] on [address] or call pt if any questions.

## 2022-10-07 NOTE — TELEPHONE ENCOUNTER
Medication:   Requested Prescriptions     Pending Prescriptions Disp Refills    HYDROcodone-acetaminophen (NORCO) 5-325 MG per tablet 90 tablet 0     Sig: Take 1 tablet by mouth every 8 hours as needed for Pain for up to 30 days. Last Filled:  9/8/22    Patient Phone Number: [phone] (home)     Last appt: 7/20/2022   Next appt: 10/21/2022    Last OARRS:   RX Monitoring 4/9/2019   Attestation The Prescription Monitoring Report for this patient was reviewed today.    Periodic Controlled Substance Monitoring -     PDMP Monitoring:    Last PDMP Akil Proctor as Reviewed [facility]):  Review User Review Instant Review Result   Janet Davis 8/11/2022 12:29 PM Reviewed PDMP [1]     Preferred Pharmacy:     Silvina Duran [address] [facility], [address] [place] [address] ([facility])  [address]  Phone: [phone] Fax: [phone]

## 2022-10-18 ENCOUNTER — HOSPITAL ENCOUNTER (OUTPATIENT)
Age: 72
Discharge: HOME | End: 2022-10-18
Payer: MEDICARE

## 2022-10-18 DIAGNOSIS — Z12.5 SCREENING PSA (PROSTATE SPECIFIC ANTIGEN): ICD-10-CM

## 2022-10-18 DIAGNOSIS — I10 ESSENTIAL HYPERTENSION: ICD-10-CM

## 2022-10-18 LAB
A/G RATIO: 1.4 (ref 1.1–2.2)
ALBUMIN SERPL-MCNC: 4 G/DL (ref 3.4–5)
ALP BLD-CCNC: 118 U/L (ref 40–129)
ALT SERPL-CCNC: 16 U/L (ref 10–40)
ANION GAP SERPL CALCULATED.3IONS-SCNC: 14 MMOL/L (ref 3–16)
AST SERPL-CCNC: 25 U/L (ref 15–37)
BILIRUB SERPL-MCNC: 0.6 MG/DL (ref 0–1)
BUN BLDV-MCNC: 16 MG/DL (ref 7–20)
CALCIUM SERPL-MCNC: 9.1 MG/DL (ref 8.3–10.6)
CHLORIDE BLD-SCNC: 99 MMOL/L (ref 99–110)
CHOLESTEROL, TOTAL: 168 MG/DL (ref 0–199)
CO2: 25 MMOL/L (ref 21–32)
CREAT SERPL-MCNC: 0.9 MG/DL (ref 0.8–1.3)
GFR SERPL CREATININE-BSD FRML MDRD: >60 ML/MIN/{1.73_M2}
GLUCOSE BLD-MCNC: 95 MG/DL (ref 70–99)
HDLC SERPL-MCNC: 58 MG/DL (ref 40–60)
LDL CHOLESTEROL CALCULATED: 83 MG/DL
POTASSIUM SERPL-SCNC: 4 MMOL/L (ref 3.5–5.1)
PROSTATE SPECIFIC ANTIGEN: 1.03 NG/ML (ref 0–4)
SODIUM BLD-SCNC: 138 MMOL/L (ref 136–145)
TOTAL PROTEIN: 6.9 G/DL (ref 6.4–8.2)
TRIGL SERPL-MCNC: 134 MG/DL (ref 0–150)
VLDLC SERPL CALC-MCNC: 27 MG/DL

## 2022-10-18 PROCEDURE — 80053 COMPREHEN METABOLIC PANEL: CPT

## 2022-10-18 PROCEDURE — 84153 ASSAY OF PSA TOTAL: CPT

## 2022-10-18 PROCEDURE — 80061 LIPID PANEL: CPT

## 2022-10-18 PROCEDURE — 36415 COLL VENOUS BLD VENIPUNCTURE: CPT

## 2022-10-21 ENCOUNTER — OFFICE VISIT (OUTPATIENT)
Dept: FAMILY MEDICINE CLINIC | Age: 72
End: 2022-10-21
Payer: MEDICARE

## 2022-10-21 VITALS
TEMPERATURE: 97.7 F | WEIGHT: 181 LBS | HEART RATE: 96 BPM | DIASTOLIC BLOOD PRESSURE: 78 MMHG | BODY MASS INDEX: 25.97 KG/M2 | SYSTOLIC BLOOD PRESSURE: 110 MMHG | OXYGEN SATURATION: 96 % | RESPIRATION RATE: 12 BRPM

## 2022-10-21 DIAGNOSIS — M65.341 TRIGGER RING FINGER OF RIGHT HAND: ICD-10-CM

## 2022-10-21 DIAGNOSIS — I10 ESSENTIAL HYPERTENSION: ICD-10-CM

## 2022-10-21 DIAGNOSIS — E78.2 MIXED HYPERLIPIDEMIA: ICD-10-CM

## 2022-10-21 DIAGNOSIS — F17.200 SMOKER UNMOTIVATED TO QUIT: ICD-10-CM

## 2022-10-21 DIAGNOSIS — M15.9 PRIMARY OSTEOARTHRITIS INVOLVING MULTIPLE JOINTS: Primary | ICD-10-CM

## 2022-10-21 DIAGNOSIS — Z23 NEED FOR INFLUENZA VACCINATION: ICD-10-CM

## 2022-10-21 PROCEDURE — G0008 ADMIN INFLUENZA VIRUS VAC: HCPCS | Performed by: FAMILY MEDICINE

## 2022-10-21 PROCEDURE — 90694 VACC AIIV4 NO PRSRV 0.5ML IM: CPT | Performed by: FAMILY MEDICINE

## 2022-10-21 PROCEDURE — 1123F ACP DISCUSS/DSCN MKR DOCD: CPT | Performed by: FAMILY MEDICINE

## 2022-10-21 PROCEDURE — 99214 OFFICE O/P EST MOD 30 MIN: CPT | Performed by: FAMILY MEDICINE

## 2022-10-21 NOTE — PROGRESS NOTES
Subjective:      Patient ID: Debra Orozco is a 71 y.o. male. CC: Patient presents for re-evaluation of chronic health problems including osteoarthritis, finger being stuck, cholesterol, hypertension and smoking. HPI Pt is here for a follow up, test results. Patient overall feels he is unchanged from last wound time. He is minimal more stiffness in his neck and the lower portion with no injury. He is still working part-time at [employer] He feels the pain medication does keep him more mobile. He has been noticing that his right ring finger becomes stuck at times and he has to pull his finger to unlock it. Unfortunate he is back smoking three quarters of a pack of cigarettes per day. States him and his wife are working on a plan of stopping smoking together. Patient is very compliant with medications with no adverse reactions. Review of Systems  Patient Active Problem List   Diagnosis    Impotence of organic origin    Hyperglycemia    Mixed hyperlipidemia    Essential hypertension    Smoker unmotivated to quit    Asbestosis ([facility] [address]    Idiopathic gout    Primary osteoarthritis involving multiple joints       Outpatient Medications Marked as Taking for the 10/21/22 encounter (Office Visit) with Kaiser Jesus MD   Medication Sig Dispense Refill    HYDROcodone-acetaminophen (NORCO) 5-325 MG per tablet Take 1 tablet by mouth every 8 hours as needed for Pain for up to 30 days.  90 tablet 0    atorvastatin (LIPITOR) 20 MG tablet TAKE 1 TABLET DAILY 90 tablet 1    lisinopril-hydroCHLOROthiazide (PRINZIDE;ZESTORETIC) 20-12.5 MG per tablet TAKE 1 TABLET TWICE A  tablet 1    colchicine (COLCRYS) 0.6 MG tablet TAKE ONE TABLET BY MOUTH TWICE A DAY AS NEEDED FOR PAIN (GOUT) 60 tablet 2       No Known Allergies    Social History     Tobacco Use    Smoking status: Every Day     Packs/day: 0.75     Years: 53.00     Pack years: 39.75     Types: Cigarettes    Smokeless tobacco: Never   Substance Use Topics Alcohol use: Yes     Comment: occasional alcohol use       /78 (Site: Right Upper Arm, Position: Sitting, Cuff Size: Medium Adult)   Pulse 96   Temp 97.7 °F (36.5 °C) (Infrared)   Resp 12   Wt 181 lb (82.1 kg)   SpO2 96%   BMI 25.97 kg/m²      Objective:   Physical Exam  Constitutional:       General: He is not in acute distress. Appearance: He is well-developed. Neck:      Vascular: No carotid bruit. Cardiovascular:      Rate and Rhythm: Normal rate and regular rhythm. Pulses:           Dorsalis pedis pulses are 2+ on the right side and 2+ on the left side. Posterior tibial pulses are 2+ on the right side and 2+ on the left side. Heart sounds: Normal heart sounds. No murmur heard. No friction rub. No gallop. Pulmonary:      Effort: Pulmonary effort is normal.      Breath sounds: Rhonchi (Few scattered) present. No decreased breath sounds or wheezing. Musculoskeletal:         General: Normal range of motion. Cervical back: No deformity, spasms or tenderness. Normal range of motion. Right lower leg: No edema. Left lower leg: No edema. Comments: Hands with enlargement of the MP joints of multiple fingers. Nodularity noted in the palm of the hand at the MP joint of his ring finger   Skin:     Findings: No rash. Neurological:      Mental Status: He is alert and oriented to person, place, and time. Sensory: Sensation is intact. No sensory deficit. Motor: Motor function is intact. Psychiatric:         Behavior: Behavior normal. Behavior is cooperative. Assessment:      Maggie NASCIMENTO was seen today for follow-up, hypertension and hyperlipidemia.     Diagnoses and all orders for this visit:    Primary osteoarthritis involving multiple joints    Need for influenza vaccination  -     Influenza, FLUAD, (age 72 y+), IM, PF, 0.5 mL    Mixed hyperlipidemia    Essential hypertension    Trigger ring finger of right hand    Smoker unmotivated to quit    OARRS report checked        Plan:      Reviewed labs and test results with patient. Laboratory profile demonstrates improvement of his cholesterol status. Encourage patient continue with current treatment plan. No change blood pressure management. Information handout provided in regards to trigger finger and discussed that if his symptoms worsen or persist the next that would be hand surgeon for consultation    Encourage patient to work out a plan with his wife so they can both stop smoking together. Patient received counseling on the following healthy behaviors: nutrition and exercise     Patient given educational materials     Health maintenance updated    Discussed use, benefit, and side effects of prescribed medications. Barriers to medication compliance addressed. All patient questions answered. Pt voiced understanding. Patient needs RTC in 3 months. Medical decision making of moderate complexity. Please note that this chart was generated using Dragon dictation software. Although every effort was made to ensure the accuracy of this automated transcription, some errors in transcription may have occurred.

## 2022-11-04 DIAGNOSIS — M15.9 PRIMARY OSTEOARTHRITIS INVOLVING MULTIPLE JOINTS: ICD-10-CM

## 2022-11-04 RX ORDER — HYDROCODONE BITARTRATE AND ACETAMINOPHEN 5; 325 MG/1; MG/1
1 TABLET ORAL EVERY 8 HOURS PRN
Qty: 90 TABLET | Refills: 0 | Status: SHIPPED | OUTPATIENT
Start: 2022-11-04 | End: 2022-12-04

## 2022-11-04 NOTE — TELEPHONE ENCOUNTER
Medication:   Requested Prescriptions     Pending Prescriptions Disp Refills    HYDROcodone-acetaminophen (NORCO) 5-325 MG per tablet 90 tablet 0     Sig: Take 1 tablet by mouth every 8 hours as needed for Pain for up to 30 days. Last Filled:  10/7/2022    Patient Phone Number: [phone] (home)     Last appt: 10/21/2022   Next appt: 1/23/2023    Last OARRS:   RX Monitoring 4/9/2019   Attestation The Prescription Monitoring Report for this patient was reviewed today.    Periodic Controlled Substance Monitoring -     PDMP Monitoring:    Last PDMP Carson Henderson as Reviewed [facility]):  Review User Review Instant Review Result   [place] 8/11/2022 12:29 PM Reviewed PDMP [1]     Preferred Pharmacy:   Sharon Hoover [address] [address]  [phone] Sheila Abad [phone]  [facility] 95 [address]  Phone: [phone] Fax: [phone] [facility], [address] [phone] - f [phone]  [address] [place] [address] [address] 1405 [facility] [address]  Phone: [phone] Fax: [phone]    Sharon Hoover [id number] Spencer Penning[address]  Sean Micheluss [address]  Phone: [phone] Fax: [phone]

## 2022-12-05 DIAGNOSIS — M15.9 PRIMARY OSTEOARTHRITIS INVOLVING MULTIPLE JOINTS: ICD-10-CM

## 2022-12-05 RX ORDER — HYDROCODONE BITARTRATE AND ACETAMINOPHEN 5; 325 MG/1; MG/1
1 TABLET ORAL EVERY 8 HOURS PRN
Qty: 90 TABLET | Refills: 0 | Status: SHIPPED | OUTPATIENT
Start: 2022-12-05 | End: 2023-01-04

## 2022-12-05 NOTE — TELEPHONE ENCOUNTER
Medication:   Requested Prescriptions     Pending Prescriptions Disp Refills    HYDROcodone-acetaminophen (NORCO) 5-325 MG per tablet 90 tablet 0     Sig: Take 1 tablet by mouth every 8 hours as needed for Pain for up to 30 days. Last Filled:  11/4/22    Patient Phone Number: [phone] (home)     Last appt: 10/21/2022   Next appt: 1/23/2023    Last OARRS:   RX Monitoring 4/9/2019   Attestation The Prescription Monitoring Report for this patient was reviewed today.    Periodic Controlled Substance Monitoring -     PDMP Monitoring:    Last PDMP Hortencia Outlaw as Reviewed [facility]):  Review User Review Instant Review Result   Miranda Tamayo 8/11/2022 12:29 PM Reviewed PDMP [1]     Preferred Pharmacy:     [facility]  [address] [facility], [address] [place] [address] ([facility])  [address] [place] [address] [address]  Phone: [phone] Fax: [phone]

## 2022-12-05 NOTE — TELEPHONE ENCOUNTER
HYDROcodone-acetaminophen (NORCO) 5-325 MG per tablet [[id number]]  ENDED    Order Details  Dose: 1 tablet Route: Oral Frequency: EVERY 8 HOURS PRN for Pain   Dispense Quantity: 90 tablet Refills: 0    Note to Pharmacy: Reduce doses taken as pain becomes manageable         Sig: Take 1 tablet by mouth every 8 hours as needed for Pain for up to 30 days.    Silvestre Vargas [id number] - FJRJ Claunch, [address] ([facility])   8078 [facility] [place], [address] [place] [address]   Phone:  [phone]  Fax:  [phone]

## 2023-01-03 DIAGNOSIS — M15.9 PRIMARY OSTEOARTHRITIS INVOLVING MULTIPLE JOINTS: ICD-10-CM

## 2023-01-03 RX ORDER — HYDROCODONE BITARTRATE AND ACETAMINOPHEN 5; 325 MG/1; MG/1
1 TABLET ORAL EVERY 8 HOURS PRN
Qty: 90 TABLET | Refills: 0 | Status: SHIPPED | OUTPATIENT
Start: 2023-01-03 | End: 2023-02-02

## 2023-01-03 NOTE — TELEPHONE ENCOUNTER
Medication:   Requested Prescriptions     Pending Prescriptions Disp Refills    HYDROcodone-acetaminophen (NORCO) 5-325 MG per tablet 90 tablet 0     Sig: Take 1 tablet by mouth every 8 hours as needed for Pain for up to 30 days. Last Filled:  12/5/2022    Patient Phone Number: [phone] (home)     Last appt: 10/21/2022   Next appt: 1/23/2023    Last OARRS:   RX Monitoring 4/9/2019   Attestation The Prescription Monitoring Report for this patient was reviewed today.    Periodic Controlled Substance Monitoring -     PDMP Monitoring:    Last PDMP [facility] as Reviewed [facility]):  Review User Review Instant Review Result   Francesca Charles 8/11/2022 12:29 PM Reviewed PDMP [1]     Preferred Pharmacy:   [facility] [address] [place], [place] [address] [facility]  [phone] Dereck Clifton [phone]  [facility] 95 [address]  Phone: [phone] Fax: [phone] - [facility], [address] [phone] - f [phone]  [address] [place] [address]  Phone: [phone] Fax: [phone]    [facility] [id number] [address]  [facility] [address]  Phone: [phone] Fax: [phone]

## 2023-01-03 NOTE — TELEPHONE ENCOUNTER
Pt called in for med refill     HYDROcodone-acetaminophen (NORCO) 5-325 MG per tablet [[id number]]     Order Details  Dose: 1 tablet Route: Oral Frequency: EVERY 8 HOURS PRN for Pain   Dispense Quantity: 90 tablet Refills: 0    Note to Pharmacy: Reduce doses taken as pain becomes manageable         Sig: Take 1 tablet by mouth every 8 hours as needed for Pain for up to 30 days.        [facility] [id number] - RKED [place], [address] ([facility])   1873 [facility], [address] [place] [address] Avenue   Phone:  [phone]  Fax:  [phone]

## 2023-01-23 ENCOUNTER — OFFICE VISIT (OUTPATIENT)
Dept: FAMILY MEDICINE CLINIC | Age: 73
End: 2023-01-23
Payer: MEDICARE

## 2023-01-23 VITALS
HEART RATE: 72 BPM | RESPIRATION RATE: 12 BRPM | BODY MASS INDEX: 26.6 KG/M2 | DIASTOLIC BLOOD PRESSURE: 74 MMHG | WEIGHT: 185.38 LBS | SYSTOLIC BLOOD PRESSURE: 144 MMHG | TEMPERATURE: 97.7 F

## 2023-01-23 DIAGNOSIS — I10 ESSENTIAL HYPERTENSION: Primary | ICD-10-CM

## 2023-01-23 DIAGNOSIS — J61 ASBESTOSIS (HCC): ICD-10-CM

## 2023-01-23 DIAGNOSIS — F17.200 SMOKER UNMOTIVATED TO QUIT: ICD-10-CM

## 2023-01-23 DIAGNOSIS — M15.9 PRIMARY OSTEOARTHRITIS INVOLVING MULTIPLE JOINTS: ICD-10-CM

## 2023-01-23 PROCEDURE — 3077F SYST BP >= 140 MM HG: CPT | Performed by: FAMILY MEDICINE

## 2023-01-23 PROCEDURE — 99214 OFFICE O/P EST MOD 30 MIN: CPT | Performed by: FAMILY MEDICINE

## 2023-01-23 PROCEDURE — 3078F DIAST BP <80 MM HG: CPT | Performed by: FAMILY MEDICINE

## 2023-01-23 PROCEDURE — 1123F ACP DISCUSS/DSCN MKR DOCD: CPT | Performed by: FAMILY MEDICINE

## 2023-01-23 ASSESSMENT — PATIENT HEALTH QUESTIONNAIRE - PHQ9
SUM OF ALL RESPONSES TO PHQ QUESTIONS 1-9: 0
SUM OF ALL RESPONSES TO PHQ9 QUESTIONS 1 & 2: 0
1. LITTLE INTEREST OR PLEASURE IN DOING THINGS: 0
SUM OF ALL RESPONSES TO PHQ QUESTIONS 1-9: 0
2. FEELING DOWN, DEPRESSED OR HOPELESS: 0

## 2023-01-23 NOTE — PROGRESS NOTES
Subjective:      Patient ID: Ele Bobby is a 71 y.o. male. CC: Patient presents for re-evaluation of chronic health problems including hypertension, asbestosis history, osteoarthritis and smoking. HPI Pt is here for a follow up on his pain medication. Patient continues taking a pain pill 3 times a day regularly. His trigger finger does not seem to bother him as much this time. He states he just feels more generally fatigued. He is now smoking a half a pack of cigarettes per day. He has been compliant taking his blood pressure medication. Review of Systems  Patient Active Problem List   Diagnosis    Impotence of organic origin    Hyperglycemia    Mixed hyperlipidemia    Essential hypertension    Smoker unmotivated to quit    Asbestosis ([facility] [address]    Idiopathic gout    Primary osteoarthritis involving multiple joints       Outpatient Medications Marked as Taking for the 1/23/23 encounter (Office Visit) with Rosalba Fitzpatrick MD   Medication Sig Dispense Refill    HYDROcodone-acetaminophen (NORCO) 5-325 MG per tablet Take 1 tablet by mouth every 8 hours as needed for Pain for up to 30 days.  90 tablet 0    atorvastatin (LIPITOR) 20 MG tablet TAKE 1 TABLET DAILY 90 tablet 1    lisinopril-hydroCHLOROthiazide (PRINZIDE;ZESTORETIC) 20-12.5 MG per tablet TAKE 1 TABLET TWICE A  tablet 1    colchicine (COLCRYS) 0.6 MG tablet TAKE ONE TABLET BY MOUTH TWICE A DAY AS NEEDED FOR PAIN (GOUT) 60 tablet 2       No Known Allergies    Social History     Tobacco Use    Smoking status: Every Day     Packs/day: 0.75     Years: 53.00     Pack years: 39.75     Types: Cigarettes    Smokeless tobacco: Never   Substance Use Topics    Alcohol use: Yes     Comment: occasional alcohol use       BP (!) 142/70 (Site: Right Upper Arm, Position: Sitting, Cuff Size: Large Adult)   Pulse 72   Temp 97.7 °F (36.5 °C) (Infrared)   Resp 12   Wt 185 lb 6 oz (84.1 kg)   BMI 26.60 kg/m²      Objective:   Physical Exam  Constitutional: General: He is not in acute distress. Appearance: He is well-developed. Neck:      Vascular: No carotid bruit. Cardiovascular:      Rate and Rhythm: Normal rate and regular rhythm. Pulses:           Dorsalis pedis pulses are 2+ on the right side and 2+ on the left side. Posterior tibial pulses are 2+ on the right side and 2+ on the left side. Heart sounds: Normal heart sounds. No murmur heard. No friction rub. No gallop. Pulmonary:      Effort: Pulmonary effort is normal.      Breath sounds: Rhonchi (Few scattered) present. No decreased breath sounds or wheezing. Musculoskeletal:         General: Normal range of motion. Cervical back: No deformity, spasms or tenderness. Normal range of motion. Right lower leg: No edema. Left lower leg: No edema. Comments: Hands with enlargement of the MP joints of multiple fingers. Nodularity noted in the palm of the hand at the MP joint of his ring finger   Skin:     Findings: No rash. Neurological:      Mental Status: He is alert and oriented to person, place, and time. Sensory: Sensation is intact. No sensory deficit. Motor: Motor function is intact. Psychiatric:         Behavior: Behavior normal. Behavior is cooperative. Assessment:      Cami Corbett was seen today for follow-up, hypertension and hyperlipidemia. Diagnoses and all orders for this visit:    Essential hypertension    Asbestosis ([address]    Primary osteoarthritis involving multiple joints    Smoker unmotivated to quit    OARRS report checked        Plan:      Patient is few days too early to prescribe the pain pills. Recommend patient continue with the pain pills on a as needed basis and not regularly. Encourage patient to discontinue smoking    This is the first time his blood pressures been elevated for some time. No change in therapy. Discussed with patient obtaining a chest CT scan with his history of smoking and asbestosis.   He defers at this time. RTC 3 months for annual Medicare visit        Please note that this chart was generated using Dragon dictation software. Although every effort was made to ensure the accuracy of this automated transcription, some errors in transcription may have occurred.

## 2023-01-30 DIAGNOSIS — I10 ESSENTIAL HYPERTENSION: ICD-10-CM

## 2023-01-30 RX ORDER — LISINOPRIL AND HYDROCHLOROTHIAZIDE 20; 12.5 MG/1; MG/1
TABLET ORAL
Qty: 180 TABLET | Refills: 0 | Status: SHIPPED | OUTPATIENT
Start: 2023-01-30

## 2023-01-30 RX ORDER — ATORVASTATIN CALCIUM 20 MG/1
TABLET, FILM COATED ORAL
Qty: 90 TABLET | Refills: 0 | Status: SHIPPED | OUTPATIENT
Start: 2023-01-30

## 2023-01-30 NOTE — TELEPHONE ENCOUNTER
Medication:   Requested Prescriptions     Pending Prescriptions Disp Refills    atorvastatin (LIPITOR) 20 MG tablet 90 tablet 1     Sig: TAKE 1 TABLET DAILY    lisinopril-hydroCHLOROthiazide (PRINZIDE;ZESTORETIC) 20-12.5 MG per tablet 180 tablet 1     Sig: TAKE 1 TABLET TWICE A DAY      Last Filled:    Patient Phone Number: [phone] (home)     Last appt: 1/23/2023   Next appt: 4/25/2023    Last OARRS:   RX Monitoring 4/9/2019   Attestation The Prescription Monitoring Report for this patient was reviewed today.    Periodic Controlled Substance Monitoring -     PDMP Monitoring:    Last PDMP Mercedes York as Reviewed [facility]):  Review User Review Instant Review Result   Tatianna Bermudez 1/23/2023  7:55 AM Reviewed PDMP [1]     Preferred Pharmacy:   [facility] [id number] [place] [address] [phone] [facility] [phone]  [facility] [address]  Phone: [phone] Fax: [phone] [place] [phone] -  [phone]  [address] [place] [address]  Phone: [phone] Fax: [phone]    [facility] [id number] [address]  Yolande Kanner [address]  Phone: [phone] Fax: [phone]

## 2023-01-31 DIAGNOSIS — M15.9 PRIMARY OSTEOARTHRITIS INVOLVING MULTIPLE JOINTS: ICD-10-CM

## 2023-01-31 RX ORDER — HYDROCODONE BITARTRATE AND ACETAMINOPHEN 5; 325 MG/1; MG/1
1 TABLET ORAL EVERY 8 HOURS PRN
Qty: 90 TABLET | Refills: 0 | Status: SHIPPED | OUTPATIENT
Start: 2023-01-31 | End: 2023-03-02

## 2023-01-31 NOTE — TELEPHONE ENCOUNTER
HYDROcodone-acetaminophen (3263 [facility]) 5-325 MG per tablet [[id number]]     Order Details  Dose: 1 tablet Route: Oral Frequency: EVERY 8 HOURS PRN for Pain   Dispense Quantity: 90 tablet Refills: 0    Note to Pharmacy: Reduce doses taken as pain becomes manageable         Sig: Take 1 tablet by mouth every 8 hours as needed for Pain for up to 30 days   [facility] [id number] [address] [address] [facility], [address] [place] [address] ([facility])   9865 [facility], [address] [place] [address] Avenue   Phone:  [phone]  Fax:  [phone]

## 2023-01-31 NOTE — TELEPHONE ENCOUNTER
Medication:   Requested Prescriptions     Pending Prescriptions Disp Refills    HYDROcodone-acetaminophen (NORCO) 5-325 MG per tablet 90 tablet 0     Sig: Take 1 tablet by mouth every 8 hours as needed for Pain for up to 30 days. Last Filled:  1/3/2023    Patient Phone Number: [phone] (home)     Last appt: 1/23/2023   Next appt: 4/25/2023    Last OARRS:   RX Monitoring 4/9/2019   Attestation The Prescription Monitoring Report for this patient was reviewed today.    Periodic Controlled Substance Monitoring -     PDMP Monitoring:    Last PDMP [place] Drown as Reviewed [facility]):  Review User Review Instant Review Result   Moises Salesrajiv 1/23/2023  7:55 AM Reviewed PDMP [1]     Preferred Pharmacy:   [facility] [address] [place] [address]  Phone: [phone] Fax: [phone]    [facility] [id number] Letyhui Gypsyliliana, [facility] [phone] Estefany Wallace [phone]  [address] [place] [address] Road  [address] [place] [address]  Phone: [phone] Fax: [phone]    [facility] ([facility] ) - [address] [phone] Estefany Wallace [phone]  [place] [address] [address] [place] [address]  Phone: [phone] Fax: [phone]

## 2023-02-16 DIAGNOSIS — M10.9 ACUTE GOUT INVOLVING TOE OF RIGHT FOOT, UNSPECIFIED CAUSE: ICD-10-CM

## 2023-02-16 NOTE — TELEPHONE ENCOUNTER
colchicine (COLCRYS) 0.6 MG tablet [[id number]]     Order Details  Dose, Route, Frequency: As Directed   Dispense Quantity: 60 tablet Refills: 2          Sig: TAKE ONE TABLET BY MOUTH TWICE A DAY AS NEEDED FOR PAIN (GOUT)       [facility] [id number] [address] [address]. Carline Jimenez. Ingrid Postin [phone] [address] [place] [address]   Phone:  [phone]  Fax:  [phone]

## 2023-02-16 NOTE — TELEPHONE ENCOUNTER
Medication:   Requested Prescriptions     Pending Prescriptions Disp Refills    colchicine (COLCRYS) 0.6 MG tablet 60 tablet 2     Sig: TAKE ONE TABLET BY MOUTH TWICE A DAY AS NEEDED FOR PAIN (GOUT)          Patient Phone Number: [phone] (home)     Last appt: 1/23/2023   Next appt: 4/25/2023    Last OARRS:   RX Monitoring 4/9/2019   Attestation The Prescription Monitoring Report for this patient was reviewed today.    Periodic Controlled Substance Monitoring -     PDMP Monitoring:    Last PDMP Ivett Winters as Reviewed [facility]):  Review User Review Instant Review Result    Sarabjit 1/23/2023  7:55 AM Reviewed PDMP [1]     Preferred Pharmacy:   [place] [address]  Phone: [phone] Fax: [phone]    [facility] [id number] Franciscoprudencio James, [address] [phone] [facility] [phone]  [address] [place] [address] Road  [address] [place] [address]  Phone: [phone] Fax: [phone]    [facility] Delivery ([facility] ) - [address] [phone] [facility] [phone]  [place] [place] [address]  Phone: [phone] Fax: [phone]

## 2023-02-17 RX ORDER — COLCHICINE 0.6 MG/1
TABLET ORAL
Qty: 60 TABLET | Refills: 2 | Status: SHIPPED | OUTPATIENT
Start: 2023-02-17

## 2023-03-02 DIAGNOSIS — M15.9 PRIMARY OSTEOARTHRITIS INVOLVING MULTIPLE JOINTS: ICD-10-CM

## 2023-03-02 RX ORDER — HYDROCODONE BITARTRATE AND ACETAMINOPHEN 5; 325 MG/1; MG/1
1 TABLET ORAL EVERY 8 HOURS PRN
Qty: 90 TABLET | Refills: 0 | Status: SHIPPED | OUTPATIENT
Start: 2023-03-02 | End: 2023-04-01

## 2023-03-02 NOTE — TELEPHONE ENCOUNTER
Medication:   Requested Prescriptions     Pending Prescriptions Disp Refills    HYDROcodone-acetaminophen (NORCO) 5-325 MG per tablet 90 tablet 0     Sig: Take 1 tablet by mouth every 8 hours as needed for Pain for up to 30 days. Last Filled:  1/31/2023    Patient Phone Number: [phone] (home)     Last appt: 1/23/2023   Next appt: 4/25/2023    Last OARRS:   RX Monitoring 4/9/2019   Attestation The Prescription Monitoring Report for this patient was reviewed today.    Periodic Controlled Substance Monitoring -     PDMP Monitoring:    Last PDMP [facility] as Reviewed [facility]):  Review User Review Instant Review Result   Toreyaneudy Laisha 1/23/2023  7:55 AM Reviewed PDMP [1]     Preferred Pharmacy:   [address] [place] [address]  [phone] [place] [phone]  Antonellaanibal\[facility]\"" 18 [address]  Phone: [phone] Fax: [phone]

## 2023-03-02 NOTE — TELEPHONE ENCOUNTER
HYDROcodone-acetaminophen (Larraine Huh) 5-325 MG per tablet     [facility] [address] [place], [place] - [address] [phone] Lizzeth Bush [phone] 5900 [facility], 3971 [facility] [place]. [address]   Phone:  [phone]  Fax:  [phone]

## 2023-03-31 DIAGNOSIS — M15.9 PRIMARY OSTEOARTHRITIS INVOLVING MULTIPLE JOINTS: ICD-10-CM

## 2023-03-31 RX ORDER — HYDROCODONE BITARTRATE AND ACETAMINOPHEN 5; 325 MG/1; MG/1
1 TABLET ORAL EVERY 8 HOURS PRN
Qty: 90 TABLET | Refills: 0 | Status: SHIPPED | OUTPATIENT
Start: 2023-03-31 | End: 2023-04-30

## 2023-03-31 NOTE — TELEPHONE ENCOUNTER
HYDROcodone-acetaminophen (Liz Benavides) 5-325 MG per tablet       [facility] [address] [facility] [address] [address] [phone] Colleen Wilkerson [phone] 5900 [facility], [address] [place] [address] Ave. [address]   Phone:  [phone]  Fax:  [phone]

## 2023-03-31 NOTE — TELEPHONE ENCOUNTER
Medication:   Requested Prescriptions     Pending Prescriptions Disp Refills    HYDROcodone-acetaminophen (NORCO) 5-325 MG per tablet 90 tablet 0     Sig: Take 1 tablet by mouth every 8 hours as needed for Pain for up to 30 days. Last Filled:  3/2/2023    Patient Phone Number: [phone] (home)     Last appt: 1/23/2023   Next appt: 4/25/2023    Last OARRS:   RX Monitoring 4/9/2019   Attestation The Prescription Monitoring Report for this patient was reviewed today.    Periodic Controlled Substance Monitoring -     PDMP Monitoring:    Last PDMP Christine Murrieta as Reviewed [facility]):  Review User Review Instant Review Result   STUART Kaye 3/2/2023  9:08 AM Reviewed PDMP [1]     Preferred Pharmacy:   [facility] [id number] [place] [address] 5301 [facility]  [phone] Lang Cage [phone]  [address] 95 [address]  Phone: [phone] Fax: [phone]    [facility] [id number] Jannette Tinoco, 3800 [facility] [phone] Lang Cage [phone]  [facility] [address]  [address] [place] [address]  Phone: [phone] Fax: [phone]    [facility] ([facility] ) - [address] [phone] Lang Cage [phone]  [place] [address] [address] [place] [address]  Phone: [phone] Fax: [phone]

## 2023-04-25 ENCOUNTER — OFFICE VISIT (OUTPATIENT)
Dept: FAMILY MEDICINE CLINIC | Age: 73
End: 2023-04-25
Payer: MEDICARE

## 2023-04-25 VITALS
OXYGEN SATURATION: 96 % | SYSTOLIC BLOOD PRESSURE: 114 MMHG | HEART RATE: 63 BPM | BODY MASS INDEX: 26.42 KG/M2 | RESPIRATION RATE: 12 BRPM | DIASTOLIC BLOOD PRESSURE: 74 MMHG | TEMPERATURE: 98.1 F | WEIGHT: 184.13 LBS

## 2023-04-25 DIAGNOSIS — I10 ESSENTIAL HYPERTENSION: ICD-10-CM

## 2023-04-25 DIAGNOSIS — M15.9 PRIMARY OSTEOARTHRITIS INVOLVING MULTIPLE JOINTS: ICD-10-CM

## 2023-04-25 DIAGNOSIS — M10.9 ACUTE GOUT INVOLVING TOE OF RIGHT FOOT, UNSPECIFIED CAUSE: ICD-10-CM

## 2023-04-25 DIAGNOSIS — G56.02 CARPAL TUNNEL SYNDROME OF LEFT WRIST: Primary | ICD-10-CM

## 2023-04-25 PROCEDURE — 1123F ACP DISCUSS/DSCN MKR DOCD: CPT | Performed by: FAMILY MEDICINE

## 2023-04-25 PROCEDURE — 99214 OFFICE O/P EST MOD 30 MIN: CPT | Performed by: FAMILY MEDICINE

## 2023-04-25 PROCEDURE — 3074F SYST BP LT 130 MM HG: CPT | Performed by: FAMILY MEDICINE

## 2023-04-25 PROCEDURE — 3078F DIAST BP <80 MM HG: CPT | Performed by: FAMILY MEDICINE

## 2023-04-25 RX ORDER — ATORVASTATIN CALCIUM 20 MG/1
TABLET, FILM COATED ORAL
Qty: 90 TABLET | Refills: 1 | Status: SHIPPED | OUTPATIENT
Start: 2023-04-25

## 2023-04-25 RX ORDER — COLCHICINE 0.6 MG/1
TABLET ORAL
Qty: 60 TABLET | Refills: 2 | Status: CANCELLED | OUTPATIENT
Start: 2023-04-25

## 2023-04-25 RX ORDER — HYDROCODONE BITARTRATE AND ACETAMINOPHEN 5; 325 MG/1; MG/1
1 TABLET ORAL EVERY 8 HOURS PRN
Qty: 90 TABLET | Refills: 0 | Status: SHIPPED | OUTPATIENT
Start: 2023-04-25 | End: 2023-05-25

## 2023-04-25 RX ORDER — LISINOPRIL AND HYDROCHLOROTHIAZIDE 20; 12.5 MG/1; MG/1
TABLET ORAL
Qty: 180 TABLET | Refills: 1 | Status: SHIPPED | OUTPATIENT
Start: 2023-04-25

## 2023-04-25 SDOH — ECONOMIC STABILITY: FOOD INSECURITY: WITHIN THE PAST 12 MONTHS, YOU WORRIED THAT YOUR FOOD WOULD RUN OUT BEFORE YOU GOT MONEY TO BUY MORE.: NEVER TRUE

## 2023-04-25 SDOH — ECONOMIC STABILITY: FOOD INSECURITY: WITHIN THE PAST 12 MONTHS, THE FOOD YOU BOUGHT JUST DIDN'T LAST AND YOU DIDN'T HAVE MONEY TO GET MORE.: NEVER TRUE

## 2023-04-25 SDOH — ECONOMIC STABILITY: HOUSING INSECURITY
IN THE LAST 12 MONTHS, WAS THERE A TIME WHEN YOU DID NOT HAVE A STEADY PLACE TO SLEEP OR SLEPT IN A SHELTER (INCLUDING NOW)?: NO

## 2023-04-25 SDOH — ECONOMIC STABILITY: INCOME INSECURITY: HOW HARD IS IT FOR YOU TO PAY FOR THE VERY BASICS LIKE FOOD, HOUSING, MEDICAL CARE, AND HEATING?: NOT HARD AT ALL

## 2023-04-25 NOTE — PROGRESS NOTES
Subjective:      Patient ID: Lissa Rangel is a 68 y.o. male. CC: Patient presents for re-evaluation of chronic health problems including osteoarthritis, gout, hypertension and left knee numbness. HPI pt is here for a follow up, med refill. Patient is noted that he is left hand is becoming numb sometimes the day and he probably notices that the index and long finger predominant fingers. He denies any nighttime symptoms. He has not noticed any changes or his  or strength. He continues have lateral left foot pain and please assist.  He states he has known flatfeet. Certain shoes seem to set this off more than others. He does take pain pills 3 times a day routinely. He denies bowel issues associate with this. Review of Systems  Patient Active Problem List   Diagnosis    Impotence of organic origin    Hyperglycemia    Mixed hyperlipidemia    Essential hypertension    Smoker unmotivated to quit    Asbestosis ([facility] [address] [facility]    Idiopathic gout    Primary osteoarthritis involving multiple joints       Outpatient Medications Marked as Taking for the 4/25/23 encounter (Office Visit) with Levi Scott MD   Medication Sig Dispense Refill    HYDROcodone-acetaminophen (NORCO) 5-325 MG per tablet Take 1 tablet by mouth every 8 hours as needed for Pain for up to 30 days.  90 tablet 0    colchicine (COLCRYS) 0.6 MG tablet TAKE ONE TABLET BY MOUTH TWICE A DAY AS NEEDED FOR PAIN (GOUT) 60 tablet 2    atorvastatin (LIPITOR) 20 MG tablet TAKE 1 TABLET DAILY 90 tablet 0    lisinopril-hydroCHLOROthiazide (PRINZIDE;ZESTORETIC) 20-12.5 MG per tablet TAKE 1 TABLET TWICE A  tablet 0       No Known Allergies    Social History     Tobacco Use    Smoking status: Every Day     Packs/day: 0.75     Years: 53.00     Pack years: 39.75     Types: Cigarettes    Smokeless tobacco: Never   Substance Use Topics    Alcohol use: Yes     Comment: occasional alcohol use       /74 (Site: Right Upper Arm, Position: Sitting, Cuff Size:

## 2023-04-27 ENCOUNTER — TELEPHONE (OUTPATIENT)
Dept: FAMILY MEDICINE CLINIC | Age: 73
End: 2023-04-27

## 2023-04-27 NOTE — TELEPHONE ENCOUNTER
HYDROcodone-acetaminophen (Fleeta Mast) 5-325 MG per tablet       [facility] [address] [facility] 5301 [facility]  [phone] Noemi Verma [phone] 5900 [facility], 1412 Danyel Verma. [address]   Phone:  [phone]  Fax:  [phone]

## 2023-05-26 DIAGNOSIS — M15.9 PRIMARY OSTEOARTHRITIS INVOLVING MULTIPLE JOINTS: ICD-10-CM

## 2023-05-26 RX ORDER — HYDROCODONE BITARTRATE AND ACETAMINOPHEN 5; 325 MG/1; MG/1
1 TABLET ORAL EVERY 8 HOURS PRN
Qty: 90 TABLET | Refills: 0 | Status: SHIPPED | OUTPATIENT
Start: 2023-05-26 | End: 2023-06-25

## 2023-05-26 NOTE — TELEPHONE ENCOUNTER
Medication:   Requested Prescriptions     Pending Prescriptions Disp Refills    HYDROcodone-acetaminophen (NORCO) 5-325 MG per tablet 90 tablet 0     Sig: Take 1 tablet by mouth every 8 hours as needed for Pain for up to 30 days. Last Filled:  4/25/23    Patient Phone Number: [phone] (home)     Last appt: 4/25/2023   Next appt: 7/17/2023    Last OARRS:   RX Monitoring 4/9/2019   Attestation The Prescription Monitoring Report for this patient was reviewed today.    Periodic Controlled Substance Monitoring -     PDMP Monitoring:    Last PDMP Li King as Reviewed [facility]):  Review User Review Instant Review Result   David Steffanie 4/25/2023  1:06 PM Reviewed PDMP [1]     Preferred Pharmacy:     [address], [address] [place] [address] ([facility])  [address] [address]  Phone: [phone] Fax: [phone]

## 2023-05-26 NOTE — TELEPHONE ENCOUNTER
HYDROcodone-acetaminophen (Cary Fink) 5-325 MG per tablet      [facility] [address] [facility] [address] [phone] Ana Maria Blackwell [phone] 5900 [facility], [address] [place] [address] [address] [address]   Phone:  [phone]  Fax:  [phone]

## 2023-06-17 DIAGNOSIS — I10 ESSENTIAL HYPERTENSION: ICD-10-CM

## 2023-06-19 RX ORDER — LISINOPRIL AND HYDROCHLOROTHIAZIDE 20; 12.5 MG/1; MG/1
TABLET ORAL
Qty: 180 TABLET | Refills: 0 | Status: SHIPPED | OUTPATIENT
Start: 2023-06-19

## 2023-06-19 RX ORDER — ATORVASTATIN CALCIUM 20 MG/1
TABLET, FILM COATED ORAL
Qty: 90 TABLET | Refills: 0 | Status: SHIPPED | OUTPATIENT
Start: 2023-06-19

## 2023-06-19 NOTE — TELEPHONE ENCOUNTER
Medication:   Requested Prescriptions     Pending Prescriptions Disp Refills    atorvastatin (LIPITOR) 20 MG tablet [Pharmacy Med Name: Atorvastatin Calcium 20 MG Oral Tablet] 90 tablet 0     Sig: TAKE 1 TABLET BY MOUTH DAILY    lisinopril-hydroCHLOROthiazide (PRINZIDE;ZESTORETIC) 20-12.5 MG per tablet [Pharmacy Med Name: Lisinopril-hydroCHLOROthiazide 20-12.5 MG Oral Tablet] 180 tablet 0     Sig: TAKE 1 TABLET BY MOUTH TWICE  DAILY      Last Filled:      Patient Phone Number: [phone] (home)     Last appt: 4/25/2023   Next appt: 7/17/2023    Last OARRS:   RX Monitoring 4/9/2019   Attestation The Prescription Monitoring Report for this patient was reviewed today.    Periodic Controlled Substance Monitoring -     PDMP Monitoring:    Last PDMP Darnell Callaway as Reviewed [facility]):  Review User Review Instant Review Result   Ligia Lozoya 4/25/2023  1:06 PM Reviewed PDMP [1]     Preferred Pharmacy:   [facility] [address] [place] [address]  [place] [address]  Phone: [phone] Fax: [phone]    [facility] [id number] Hope Seat, [address] [phone] Emily Shell [phone]  [address] [place] [address] Road  [address] [place] [address]  Phone: [phone] Fax: [phone]    [facility] Delivery ([facility] ) - Mariola Sidhu 3 [phone] Emily Shell [phone]  [facility] [address] [place] [address]  Phone: [phone] Fax: [phone]

## 2023-06-28 DIAGNOSIS — M15.9 PRIMARY OSTEOARTHRITIS INVOLVING MULTIPLE JOINTS: ICD-10-CM

## 2023-06-28 RX ORDER — HYDROCODONE BITARTRATE AND ACETAMINOPHEN 5; 325 MG/1; MG/1
1 TABLET ORAL EVERY 8 HOURS PRN
Qty: 90 TABLET | Refills: 0 | Status: SHIPPED | OUTPATIENT
Start: 2023-06-28 | End: 2023-07-28

## 2023-07-12 DIAGNOSIS — M10.9 ACUTE GOUT INVOLVING TOE OF RIGHT FOOT, UNSPECIFIED CAUSE: ICD-10-CM

## 2023-07-12 RX ORDER — COLCHICINE 0.6 MG/1
TABLET ORAL
Qty: 60 TABLET | Refills: 2 | Status: SHIPPED | OUTPATIENT
Start: 2023-07-12

## 2023-07-12 NOTE — TELEPHONE ENCOUNTER
Medication:   Requested Prescriptions     Pending Prescriptions Disp Refills    colchicine (COLCRYS) 0.6 MG tablet 60 tablet 2     Sig: TAKE ONE TABLET BY MOUTH TWICE A DAY AS NEEDED FOR PAIN (GOUT)      Last Filled:      Patient Phone Number: [phone] (home)     Last appt: 4/25/2023   Next appt: 7/17/2023    Last OARRS:   RX Monitoring 4/9/2019   Attestation The Prescription Monitoring Report for this patient was reviewed today.    Periodic Controlled Substance Monitoring -     PDMP Monitoring:    Last PDMP Debby Mendoza as Reviewed [facility]):  Review User Review Instant Review Result   Darryl Douglas 4/25/2023  1:06 PM Reviewed PDMP [1]     Preferred Pharmacy:   [facility] [address] [place] [address]  Phone: [phone] Fax: [phone]    [facility] [id number] Akin Okeefe[address] [phone] [facility] [phone]  [address] [place] [address] Damon [place] [address]  Phone: [phone] Fax: [phone]    [facility] Delivery ([facility] ) - TATYANAJemima [facility] [phone] [facility] [phone]  634 [facility] [address]  Phone: [phone] Fax: [phone]

## 2023-07-12 NOTE — TELEPHONE ENCOUNTER
colchicine (COLCRYS) 0.6 MG tablet [[id number]    Courtney Garcia [id number] - [facility] IFBFUNR, 6325 [facility] [phone]   400 [facility], [address] [place] [address]   Phone:  [phone]  Fax:  [phone]

## 2023-07-17 ENCOUNTER — OFFICE VISIT (OUTPATIENT)
Dept: FAMILY MEDICINE CLINIC | Age: 73
End: 2023-07-17
Payer: MEDICARE

## 2023-07-17 VITALS
HEIGHT: 71 IN | WEIGHT: 175.6 LBS | DIASTOLIC BLOOD PRESSURE: 72 MMHG | HEART RATE: 86 BPM | TEMPERATURE: 97.1 F | OXYGEN SATURATION: 98 % | SYSTOLIC BLOOD PRESSURE: 154 MMHG | BODY MASS INDEX: 24.58 KG/M2

## 2023-07-17 DIAGNOSIS — E78.2 MIXED HYPERLIPIDEMIA: ICD-10-CM

## 2023-07-17 DIAGNOSIS — I10 ESSENTIAL HYPERTENSION: ICD-10-CM

## 2023-07-17 DIAGNOSIS — M15.9 PRIMARY OSTEOARTHRITIS INVOLVING MULTIPLE JOINTS: ICD-10-CM

## 2023-07-17 DIAGNOSIS — Z12.5 SCREENING PSA (PROSTATE SPECIFIC ANTIGEN): ICD-10-CM

## 2023-07-17 DIAGNOSIS — Z00.00 MEDICARE ANNUAL WELLNESS VISIT, SUBSEQUENT: Primary | ICD-10-CM

## 2023-07-17 DIAGNOSIS — R73.9 HYPERGLYCEMIA: ICD-10-CM

## 2023-07-17 DIAGNOSIS — F17.200 SMOKER UNMOTIVATED TO QUIT: ICD-10-CM

## 2023-07-17 PROCEDURE — 1123F ACP DISCUSS/DSCN MKR DOCD: CPT | Performed by: FAMILY MEDICINE

## 2023-07-17 PROCEDURE — 3078F DIAST BP <80 MM HG: CPT | Performed by: FAMILY MEDICINE

## 2023-07-17 PROCEDURE — 3074F SYST BP LT 130 MM HG: CPT | Performed by: FAMILY MEDICINE

## 2023-07-17 PROCEDURE — G0439 PPPS, SUBSEQ VISIT: HCPCS | Performed by: FAMILY MEDICINE

## 2023-07-17 ASSESSMENT — PATIENT HEALTH QUESTIONNAIRE - PHQ9
SUM OF ALL RESPONSES TO PHQ QUESTIONS 1-9: 0
SUM OF ALL RESPONSES TO PHQ QUESTIONS 1-9: 0
1. LITTLE INTEREST OR PLEASURE IN DOING THINGS: 0
SUM OF ALL RESPONSES TO PHQ9 QUESTIONS 1 & 2: 0
SUM OF ALL RESPONSES TO PHQ QUESTIONS 1-9: 0
SUM OF ALL RESPONSES TO PHQ QUESTIONS 1-9: 0
2. FEELING DOWN, DEPRESSED OR HOPELESS: 0

## 2023-07-17 ASSESSMENT — LIFESTYLE VARIABLES
HOW OFTEN DURING THE LAST YEAR HAVE YOU NEEDED AN ALCOHOLIC DRINK FIRST THING IN THE MORNING TO GET YOURSELF GOING AFTER A NIGHT OF HEAVY DRINKING: 0
HOW OFTEN DO YOU HAVE A DRINK CONTAINING ALCOHOL: 4 OR MORE TIMES A WEEK
HAVE YOU OR SOMEONE ELSE BEEN INJURED AS A RESULT OF YOUR DRINKING: 0
HOW OFTEN DURING THE LAST YEAR HAVE YOU FOUND THAT YOU WERE NOT ABLE TO STOP DRINKING ONCE YOU HAD STARTED: 0
HOW OFTEN DURING THE LAST YEAR HAVE YOU BEEN UNABLE TO REMEMBER WHAT HAPPENED THE NIGHT BEFORE BECAUSE YOU HAD BEEN DRINKING: 0
HOW OFTEN DURING THE LAST YEAR HAVE YOU HAD A FEELING OF GUILT OR REMORSE AFTER DRINKING: 0
HOW OFTEN DURING THE LAST YEAR HAVE YOU FAILED TO DO WHAT WAS NORMALLY EXPECTED FROM YOU BECAUSE OF DRINKING: 0
HOW MANY STANDARD DRINKS CONTAINING ALCOHOL DO YOU HAVE ON A TYPICAL DAY: 1 OR 2
HAS A RELATIVE, FRIEND, DOCTOR, OR ANOTHER HEALTH PROFESSIONAL EXPRESSED CONCERN ABOUT YOUR DRINKING OR SUGGESTED YOU CUT DOWN: 0

## 2023-07-27 DIAGNOSIS — M15.9 PRIMARY OSTEOARTHRITIS INVOLVING MULTIPLE JOINTS: ICD-10-CM

## 2023-07-27 RX ORDER — HYDROCODONE BITARTRATE AND ACETAMINOPHEN 5; 325 MG/1; MG/1
1 TABLET ORAL EVERY 8 HOURS PRN
Qty: 90 TABLET | Refills: 0 | Status: SHIPPED | OUTPATIENT
Start: 2023-07-27 | End: 2023-08-26

## 2023-07-27 NOTE — TELEPHONE ENCOUNTER
HYDROcodone-acetaminophen (Ervin Brown) 5-325 MG per tablet   [facility] [address] [place] [address] [facility] [phone] Jasmeet Palomo [phone]   [address] [place] [address]   Phone:  [phone]  Fax:  [phone]

## 2023-07-27 NOTE — TELEPHONE ENCOUNTER
Medication:   Requested Prescriptions     Pending Prescriptions Disp Refills    HYDROcodone-acetaminophen (NORCO) 5-325 MG per tablet 90 tablet 0     Sig: Take 1 tablet by mouth every 8 hours as needed for Pain for up to 30 days. Last Filled:  6/28/2023    Patient Phone Number: [phone] (home)     Last appt: 7/17/2023   Next appt: 10/23/2023    Last OARRS:   RX Monitoring 4/9/2019   Attestation The Prescription Monitoring Report for this patient was reviewed today.    Periodic Controlled Substance Monitoring -     PDMP Monitoring:    Last PDMP Trudy Crookston as Reviewed [facility]):  Review User Review Instant Review Result   Alexandru Adlerpedro 7/17/2023  9:40 AM Reviewed PDMP [1]     Preferred Pharmacy:   [facility] [address], [facility] W [facility] [phone] Juan Anthony [phone] [address] [place] [address]  Phone: [phone] Fax: [phone]

## 2023-08-29 DIAGNOSIS — M15.9 PRIMARY OSTEOARTHRITIS INVOLVING MULTIPLE JOINTS: ICD-10-CM

## 2023-08-29 RX ORDER — HYDROCODONE BITARTRATE AND ACETAMINOPHEN 5; 325 MG/1; MG/1
1 TABLET ORAL EVERY 8 HOURS PRN
Qty: 90 TABLET | Refills: 0 | Status: SHIPPED | OUTPATIENT
Start: 2023-08-29 | End: 2023-09-02

## 2023-08-29 NOTE — TELEPHONE ENCOUNTER
Medication:   Requested Prescriptions     Pending Prescriptions Disp Refills    HYDROcodone-acetaminophen (NORCO) 5-325 MG per tablet 90 tablet 0     Sig: Take 1 tablet by mouth every 8 hours as needed for Pain for up to 30 days. Last Filled:  7/27    Patient Phone Number: [phone] (home)     Last appt: 7/17/2023   Next appt: 10/23/2023    Last OARRS:   RX Monitoring 4/9/2019   Attestation The Prescription Monitoring Report for this patient was reviewed today.    Periodic Controlled Substance Monitoring -     PDMP Monitoring:    Last PDMP Patricia Linton as Reviewed [facility]):  Review User Review Instant Review Result   Anabell VILA 7/27/2023 12:20 PM Reviewed PDMP [1]     Preferred Pharmacy:   [facility] [address] [phone] [facility] [phone]  [facility] [address] [place] [address]  Phone: [phone] Fax: [phone]    [facility] [id number] Conchita La, [address] [place] [address] [phone] [facility] [phone]  [address] [place] [address] Damon [place]ela [address]  Phone: [phone] Fax: [phone]    [facility] Delivery ([facility]) - TATYANA [address] [place] [address] [phone] [facility] [phone]  [address] [place] [address]  Phone: [phone] Fax: [phone]

## 2023-09-02 DIAGNOSIS — M15.9 PRIMARY OSTEOARTHRITIS INVOLVING MULTIPLE JOINTS: ICD-10-CM

## 2023-09-02 RX ORDER — HYDROCODONE BITARTRATE AND ACETAMINOPHEN 5; 325 MG/1; MG/1
1 TABLET ORAL EVERY 8 HOURS PRN
Qty: 90 TABLET | Refills: 0 | Status: SHIPPED | OUTPATIENT
Start: 2023-09-02 | End: 2023-10-02

## 2023-09-03 DIAGNOSIS — I10 ESSENTIAL HYPERTENSION: ICD-10-CM

## 2023-09-05 RX ORDER — LISINOPRIL AND HYDROCHLOROTHIAZIDE 20; 12.5 MG/1; MG/1
TABLET ORAL
Qty: 180 TABLET | Refills: 0 | Status: SHIPPED | OUTPATIENT
Start: 2023-09-05 | End: 2023-10-23 | Stop reason: SDUPTHER

## 2023-09-05 RX ORDER — ATORVASTATIN CALCIUM 20 MG/1
TABLET, FILM COATED ORAL
Qty: 90 TABLET | Refills: 0 | Status: SHIPPED | OUTPATIENT
Start: 2023-09-05 | End: 2023-10-23 | Stop reason: SDUPTHER

## 2023-09-05 NOTE — TELEPHONE ENCOUNTER
Medication:   Requested Prescriptions     Pending Prescriptions Disp Refills    lisinopril-hydroCHLOROthiazide (PRINZIDE;ZESTORETIC) 20-12.5 MG per tablet [Pharmacy Med Name: Lisinopril-hydroCHLOROthiazide 20-12.5 MG Oral Tablet] 180 tablet 3     Sig: TAKE 1 TABLET BY MOUTH TWICE  DAILY    atorvastatin (LIPITOR) 20 MG tablet [Pharmacy Med Name: Atorvastatin Calcium 20 MG Oral Tablet] 90 tablet 3     Sig: TAKE 1 TABLET BY MOUTH ONCE  DAILY      Last Filled:      Patient Phone Number: [phone] (home)     Last appt: 7/17/2023   Next appt: 10/23/2023    Last OARRS:   RX Monitoring 4/9/2019   Attestation The Prescription Monitoring Report for this patient was reviewed today.    Periodic Controlled Substance Monitoring -     PDMP Monitoring:    Last PDMP Harrison Patel as Reviewed [facility]):  Review User Review Instant Review Result   Darliss Galeazzi 7/27/2023 12:20 PM Reviewed PDMP [1]     Preferred Pharmacy:       [facility] Delivery ([facility]) - TATYANA [address] [place] [address] [phone] Hulan Runner [phone]  [address] [place] [address]  Phone: [phone] Fax: [phone]

## 2023-09-29 DIAGNOSIS — M15.9 PRIMARY OSTEOARTHRITIS INVOLVING MULTIPLE JOINTS: ICD-10-CM

## 2023-09-29 RX ORDER — HYDROCODONE BITARTRATE AND ACETAMINOPHEN 5; 325 MG/1; MG/1
1 TABLET ORAL EVERY 8 HOURS PRN
Qty: 90 TABLET | Refills: 0 | Status: SHIPPED | OUTPATIENT
Start: 2023-09-29 | End: 2023-10-29

## 2023-09-29 NOTE — TELEPHONE ENCOUNTER
Medication:   Requested Prescriptions     Pending Prescriptions Disp Refills    HYDROcodone-acetaminophen (NORCO) 5-325 MG per tablet 90 tablet 0     Sig: Take 1 tablet by mouth every 8 hours as needed for Pain for up to 30 days. Last Filled:  9/2    Patient Phone Number: [phone] (home)     Last appt: 7/17/2023   Next appt: 10/23/2023    Last OARRS:       4/9/2019     5:31 PM   RX Monitoring   Attestation The Prescription Monitoring Report for this patient was reviewed today.      PDMP Monitoring:    Last PDMP Lauren Tyson as Reviewed [facility]):  Review User Review Instant Review Result   Scotty TracyVASQUEZCORTES VILA 7/27/2023 12:20 PM Reviewed PDMP [1]     Preferred Pharmacy:   [facility] [address] [phone] [facility] [phone]  [facility] [address] [place] [address]  Phone: [phone] Fax: [phone]    [facility] [phone] [facility] 6442 - 333 [facility] [phone] [facility] [phone]  [address] [place] [address]  Phone: [phone] Fax: [phone] [address] [place] [address]32 [address] [place] [address] [phone] Tito Srinivasa [phone]  [facility]3 [facility] [address]  Phone: [phone] Fax: [phone] [phone] [facility] [phone]  [place] 333 N [facility] [address]  Phone: [phone] Fax: [phone]

## 2023-10-20 ENCOUNTER — HOSPITAL ENCOUNTER (OUTPATIENT)
Age: 73
Discharge: HOME | End: 2023-10-20
Payer: MEDICARE

## 2023-10-20 DIAGNOSIS — R73.9 HYPERGLYCEMIA: ICD-10-CM

## 2023-10-20 DIAGNOSIS — Z12.5 SCREENING PSA (PROSTATE SPECIFIC ANTIGEN): ICD-10-CM

## 2023-10-20 DIAGNOSIS — E78.2 MIXED HYPERLIPIDEMIA: ICD-10-CM

## 2023-10-20 DIAGNOSIS — I10 ESSENTIAL HYPERTENSION: ICD-10-CM

## 2023-10-20 LAB
ALBUMIN SERPL-MCNC: 4.3 G/DL (ref 3.4–5)
ALBUMIN/GLOB SERPL: 1.5 {RATIO} (ref 1.1–2.2)
ALP SERPL-CCNC: 116 U/L (ref 40–129)
ALT SERPL-CCNC: 16 U/L (ref 10–40)
ANION GAP SERPL CALCULATED.3IONS-SCNC: 12 MMOL/L (ref 3–16)
AST SERPL-CCNC: 23 U/L (ref 15–37)
BILIRUB SERPL-MCNC: 0.6 MG/DL (ref 0–1)
BUN SERPL-MCNC: 17 MG/DL (ref 7–20)
CALCIUM SERPL-MCNC: 8.9 MG/DL (ref 8.3–10.6)
CHLORIDE SERPL-SCNC: 99 MMOL/L (ref 99–110)
CHOLEST SERPL-MCNC: 184 MG/DL (ref 0–199)
CO2 SERPL-SCNC: 26 MMOL/L (ref 21–32)
CREAT SERPL-MCNC: 1 MG/DL (ref 0.8–1.3)
GFR SERPLBLD CREATININE-BSD FMLA CKD-EPI: >60 ML/MIN/{1.73_M2}
GLUCOSE SERPL-MCNC: 113 MG/DL (ref 70–99)
HDLC SERPL-MCNC: 57 MG/DL (ref 40–60)
LDLC SERPL CALC-MCNC: 94 MG/DL
POTASSIUM SERPL-SCNC: 4.3 MMOL/L (ref 3.5–5.1)
PROT SERPL-MCNC: 7.2 G/DL (ref 6.4–8.2)
PSA SERPL DL<=0.01 NG/ML-MCNC: 2.41 NG/ML (ref 0–4)
SODIUM SERPL-SCNC: 137 MMOL/L (ref 136–145)
TRIGL SERPL-MCNC: 164 MG/DL (ref 0–150)
TSH SERPL DL<=0.005 MIU/L-ACNC: 0.8 UIU/ML (ref 0.27–4.2)
VLDLC SERPL CALC-MCNC: 33 MG/DL

## 2023-10-20 PROCEDURE — 36415 COLL VENOUS BLD VENIPUNCTURE: CPT

## 2023-10-20 PROCEDURE — 80053 COMPREHEN METABOLIC PANEL: CPT

## 2023-10-20 PROCEDURE — 84443 ASSAY THYROID STIM HORMONE: CPT

## 2023-10-20 PROCEDURE — 84153 ASSAY OF PSA TOTAL: CPT

## 2023-10-20 PROCEDURE — 80061 LIPID PANEL: CPT

## 2023-10-20 PROCEDURE — 83036 HEMOGLOBIN GLYCOSYLATED A1C: CPT

## 2023-10-21 LAB
EST. AVERAGE GLUCOSE BLD GHB EST-MCNC: 119.8 MG/DL
HBA1C MFR BLD: 5.8 %

## 2023-10-23 ENCOUNTER — OFFICE VISIT (OUTPATIENT)
Dept: FAMILY MEDICINE CLINIC | Age: 73
End: 2023-10-23
Payer: MEDICARE

## 2023-10-23 VITALS
BODY MASS INDEX: 24.32 KG/M2 | RESPIRATION RATE: 12 BRPM | DIASTOLIC BLOOD PRESSURE: 82 MMHG | TEMPERATURE: 97.2 F | OXYGEN SATURATION: 96 % | HEART RATE: 88 BPM | WEIGHT: 174.38 LBS | SYSTOLIC BLOOD PRESSURE: 136 MMHG

## 2023-10-23 DIAGNOSIS — Z23 NEED FOR INFLUENZA VACCINATION: ICD-10-CM

## 2023-10-23 DIAGNOSIS — I10 ESSENTIAL HYPERTENSION: ICD-10-CM

## 2023-10-23 DIAGNOSIS — L84 CALLUS OF FOOT: ICD-10-CM

## 2023-10-23 DIAGNOSIS — M15.9 PRIMARY OSTEOARTHRITIS INVOLVING MULTIPLE JOINTS: ICD-10-CM

## 2023-10-23 DIAGNOSIS — E78.2 MIXED HYPERLIPIDEMIA: ICD-10-CM

## 2023-10-23 DIAGNOSIS — R73.9 HYPERGLYCEMIA: Primary | ICD-10-CM

## 2023-10-23 PROCEDURE — 3074F SYST BP LT 130 MM HG: CPT | Performed by: FAMILY MEDICINE

## 2023-10-23 PROCEDURE — 3078F DIAST BP <80 MM HG: CPT | Performed by: FAMILY MEDICINE

## 2023-10-23 PROCEDURE — G0008 ADMIN INFLUENZA VIRUS VAC: HCPCS | Performed by: FAMILY MEDICINE

## 2023-10-23 PROCEDURE — 90694 VACC AIIV4 NO PRSRV 0.5ML IM: CPT | Performed by: FAMILY MEDICINE

## 2023-10-23 PROCEDURE — 1123F ACP DISCUSS/DSCN MKR DOCD: CPT | Performed by: FAMILY MEDICINE

## 2023-10-23 PROCEDURE — 99214 OFFICE O/P EST MOD 30 MIN: CPT | Performed by: FAMILY MEDICINE

## 2023-10-23 RX ORDER — HYDROCODONE BITARTRATE AND ACETAMINOPHEN 5; 325 MG/1; MG/1
1 TABLET ORAL EVERY 8 HOURS PRN
Qty: 90 TABLET | Refills: 0 | Status: SHIPPED | OUTPATIENT
Start: 2023-10-23 | End: 2023-11-22

## 2023-10-23 RX ORDER — ATORVASTATIN CALCIUM 20 MG/1
TABLET, FILM COATED ORAL
Qty: 90 TABLET | Refills: 1 | Status: SHIPPED | OUTPATIENT
Start: 2023-10-23

## 2023-10-23 RX ORDER — LISINOPRIL AND HYDROCHLOROTHIAZIDE 20; 12.5 MG/1; MG/1
1 TABLET ORAL 2 TIMES DAILY
Qty: 180 TABLET | Refills: 1 | Status: SHIPPED | OUTPATIENT
Start: 2023-10-23

## 2023-10-23 NOTE — PROGRESS NOTES
Subjective:      Patient ID: Vianca Acosta is a 68 y.o. male. CC: Patient presents for re-evaluation of chronic health problems including hyperglycemia, hyperlipidemia, hypertension, osteoarthritis and smoking. HPI Pt is here for a follow up, med refill, test results. Patient overall feels he is unchanged from last visit. He is down smoking to half a pack of cigarettes per day and he states his wife is almost completely stop smoking. He plans to continue working on stopping smoking entirely. He continues to work part-time and enjoys his work. He was have a little bit more knee arthritis problems and he noticed a nodule on the side of his left foot. He continues take pain pills about 3 times a day. Patient is very compliant with medications with no adverse reactions. Review of Systems  Patient Active Problem List   Diagnosis    Impotence of organic origin    Hyperglycemia    Mixed hyperlipidemia    Essential hypertension    Smoker unmotivated to quit    Asbestosis ([address])    Idiopathic gout    Primary osteoarthritis involving multiple joints       Outpatient Medications Marked as Taking for the 10/23/23 encounter (Office Visit) with Phil Ramírez MD   Medication Sig Dispense Refill    HYDROcodone-acetaminophen (NORCO) 5-325 MG per tablet Take 1 tablet by mouth every 8 hours as needed for Pain for up to 30 days.  90 tablet 0    lisinopril-hydroCHLOROthiazide (PRINZIDE;ZESTORETIC) 20-12.5 MG per tablet TAKE 1 TABLET BY MOUTH TWICE  DAILY 180 tablet 0    atorvastatin (LIPITOR) 20 MG tablet TAKE 1 TABLET BY MOUTH ONCE  DAILY 90 tablet 0    colchicine (COLCRYS) 0.6 MG tablet TAKE ONE TABLET BY MOUTH TWICE A DAY AS NEEDED FOR PAIN (GOUT) 60 tablet 2       No Known Allergies    Social History     Tobacco Use    Smoking status: Every Day     Packs/day: 0.75     Years: 53.00     Additional pack years: 0.00     Total pack years: 39.75     Types: Cigarettes    Smokeless tobacco: Never   Substance Use Topics

## 2023-10-23 NOTE — PROGRESS NOTES
Vaccine Information Sheet, \"Influenza - Inactivated\"  given to Aliya , or parent/legal guardian of  Aliya  and verbalized understanding. Patient responses:    Have you ever had a reaction to a flu vaccine? No  Are you able to eat eggs without adverse effects? Yes  Do you have any current illness? No  Have you ever had Guillian Roy Syndrome? No    Flu vaccine given per order. Please see immunization tab.     Immunization(s) given during visit:     Immunizations Administered       Name Date Dose Route    Influenza, FLUAD, (age 72 y+), Adjuvanted, 0.5mL 10/23/2023 0.5 mL Intramuscular    Site: Deltoid- Right    Lot: [id number]    [address]: [address]-250-67

## 2023-11-27 DIAGNOSIS — M15.9 PRIMARY OSTEOARTHRITIS INVOLVING MULTIPLE JOINTS: ICD-10-CM

## 2023-11-27 DIAGNOSIS — I10 ESSENTIAL HYPERTENSION: ICD-10-CM

## 2023-11-27 RX ORDER — HYDROCODONE BITARTRATE AND ACETAMINOPHEN 5; 325 MG/1; MG/1
1 TABLET ORAL EVERY 8 HOURS PRN
Qty: 90 TABLET | Refills: 0 | Status: SHIPPED | OUTPATIENT
Start: 2023-11-27 | End: 2023-12-27

## 2023-11-27 NOTE — TELEPHONE ENCOUNTER
Medication:   Requested Prescriptions     Pending Prescriptions Disp Refills    HYDROcodone-acetaminophen (NORCO) 5-325 MG per tablet 90 tablet 0     Sig: Take 1 tablet by mouth every 8 hours as needed for Pain for up to 30 days. Last Filled:  10/23    Patient Phone Number: [phone] (home)     Last appt: 10/23/2023   Next appt: 2/15/2024    Last OARRS:       4/9/2019     5:31 PM   RX Monitoring   Attestation The Prescription Monitoring Report for this patient was reviewed today.      PDMP Monitoring:    Last PDMP Harrison Patel as Reviewed [facility]):  Review User Review Instant Review Result   Sia Crespo 10/23/2023  1:09 PM Reviewed PDMP [1]     Preferred Pharmacy:   [facility] [address] [place] [address] [phone] Expreemlan Runner [phone]  [facility] [address] [place] [address]  Phone: [phone] Fax: [phone]    [facility] [id number] Eligio Harmon[address] [phone] Hulan Runner [phone]  5 [facility] [address]  Phone: [phone] Fax: [phone] [address] [address] [phone] Hulan Runner [phone]  [facility] [address] [address]  Phone: [phone] Fax: [phone], [address] [place] [address] [phone] Expreemlan Runner [phone]  [place] [address] [facility] [address]  Phone: [phone] Fax: [phone]

## 2023-11-28 RX ORDER — LISINOPRIL AND HYDROCHLOROTHIAZIDE 20; 12.5 MG/1; MG/1
1 TABLET ORAL 2 TIMES DAILY
Qty: 180 TABLET | Refills: 3 | Status: SHIPPED | OUTPATIENT
Start: 2023-11-28

## 2023-11-28 RX ORDER — ATORVASTATIN CALCIUM 20 MG/1
TABLET, FILM COATED ORAL
Qty: 90 TABLET | Refills: 3 | Status: SHIPPED | OUTPATIENT
Start: 2023-11-28

## 2023-11-28 NOTE — TELEPHONE ENCOUNTER
Medication:   Requested Prescriptions     Pending Prescriptions Disp Refills    atorvastatin (LIPITOR) 20 MG tablet [Pharmacy Med Name: Atorvastatin Calcium 20 MG Oral Tablet] 90 tablet 3     Sig: TAKE 1 TABLET BY MOUTH ONCE  DAILY    lisinopril-hydroCHLOROthiazide (PRINZIDE;ZESTORETIC) 20-12.5 MG per tablet [Pharmacy Med Name: Lisinopril-hydroCHLOROthiazide 20-12.5 MG Oral Tablet] 180 tablet 3     Sig: TAKE 1 TABLET BY MOUTH TWICE  DAILY      Last Filled:      Patient Phone Number: [phone] (home)     Last appt: 10/23/2023   Next appt: 2/15/2024    Last OARRS:       4/9/2019     5:31 PM   RX Monitoring   Attestation The Prescription Monitoring Report for this patient was reviewed today.      PDMP Monitoring:    Last PDMP Madhav Points as Reviewed [facility]):  Review User Review Instant Review Result   Belkys Barone 10/23/2023  1:09 PM Reviewed PDMP [1]     Preferred Pharmacy:   [address] [place] [address] [phone] Joe Sosa [phone]  [address] [place] [address]  Phone: [phone] Fax: [phone]

## 2023-12-26 DIAGNOSIS — M15.9 PRIMARY OSTEOARTHRITIS INVOLVING MULTIPLE JOINTS: ICD-10-CM

## 2023-12-26 RX ORDER — HYDROCODONE BITARTRATE AND ACETAMINOPHEN 5; 325 MG/1; MG/1
1 TABLET ORAL EVERY 8 HOURS PRN
Qty: 90 TABLET | Refills: 0 | Status: SHIPPED | OUTPATIENT
Start: 2023-12-26 | End: 2024-01-25

## 2023-12-26 NOTE — TELEPHONE ENCOUNTER
Medication:   Requested Prescriptions     Pending Prescriptions Disp Refills    HYDROcodone-acetaminophen (NORCO) 5-325 MG per tablet 90 tablet 0     Sig: Take 1 tablet by mouth every 8 hours as needed for Pain for up to 30 days. Last Filled:  11/27/23    Patient Phone Number: [phone] (home)     Last appt: 10/23/2023   Next appt: 2/15/2024    Last OARRS:       4/9/2019     5:31 PM   RX Monitoring   Attestation The Prescription Monitoring Report for this patient was reviewed today.      PDMP Monitoring:    Last PDMP Tl Reece as Reviewed [facility]):  Review User Review Instant Review Result   France Curran 10/23/2023  1:09 PM Reviewed PDMP [1]     Preferred Pharmacy:   [place] [address] [phone] Jesse Sweet [phone]  [facility]4 [address] [place] [address]  Phone: [phone] Fax: [phone]

## 2023-12-26 NOTE — TELEPHONE ENCOUNTER
HYDROcodone-acetaminophen (Creta Rung) 5-325 MG per tablet     [facility] [address] [phone] Neil Alamo [phone]  [address] [place] [address]  Phone: [phone]  Fax: [phone]

## 2024-01-24 DIAGNOSIS — M15.9 PRIMARY OSTEOARTHRITIS INVOLVING MULTIPLE JOINTS: ICD-10-CM

## 2024-01-24 NOTE — TELEPHONE ENCOUNTER
Medication:   Requested Prescriptions     Pending Prescriptions Disp Refills    HYDROcodone-acetaminophen (NORCO) 5-325 MG per tablet 90 tablet 0     Sig: Take 1 tablet by mouth every 8 hours as needed for Pain for up to 30 days.      Last Filled:  12/26/23    Patient Phone Number: [phone] (home)     Last appt: 10/23/2023   Next appt: 2/15/2024    Last OARRS:       4/9/2019     5:31 PM   RX Monitoring   Attestation The Prescription Monitoring Report for this patient was reviewed today.     PDMP Monitoring:    Last PDMP Ted as Reviewed ([place]):  Review User Review Instant Review Result   BRIGITTE MCBRIDE 10/23/2023  1:09 PM Reviewed PDMP [1]     Preferred Pharmacy: [facility] [id number] [facility] [place] [place] - [address] [facility] [place] [phone] - F [phone]

## 2024-01-25 RX ORDER — HYDROCODONE BITARTRATE AND ACETAMINOPHEN 5; 325 MG/1; MG/1
1 TABLET ORAL EVERY 8 HOURS PRN
Qty: 90 TABLET | Refills: 0 | Status: SHIPPED | OUTPATIENT
Start: 2024-01-25 | End: 2024-02-24

## 2024-02-15 ENCOUNTER — OFFICE VISIT (OUTPATIENT)
Dept: FAMILY MEDICINE CLINIC | Age: 74
End: 2024-02-15
Payer: MEDICARE

## 2024-02-15 VITALS
RESPIRATION RATE: 12 BRPM | HEART RATE: 88 BPM | OXYGEN SATURATION: 97 % | DIASTOLIC BLOOD PRESSURE: 70 MMHG | TEMPERATURE: 97.3 F | WEIGHT: 171.38 LBS | SYSTOLIC BLOOD PRESSURE: 110 MMHG | BODY MASS INDEX: 23.9 KG/M2

## 2024-02-15 DIAGNOSIS — F17.200 SMOKER UNMOTIVATED TO QUIT: ICD-10-CM

## 2024-02-15 DIAGNOSIS — M15.9 PRIMARY OSTEOARTHRITIS INVOLVING MULTIPLE JOINTS: Primary | ICD-10-CM

## 2024-02-15 DIAGNOSIS — I10 ESSENTIAL HYPERTENSION: ICD-10-CM

## 2024-02-15 PROCEDURE — 99214 OFFICE O/P EST MOD 30 MIN: CPT | Performed by: FAMILY MEDICINE

## 2024-02-15 PROCEDURE — 1123F ACP DISCUSS/DSCN MKR DOCD: CPT | Performed by: FAMILY MEDICINE

## 2024-02-15 PROCEDURE — 3078F DIAST BP <80 MM HG: CPT | Performed by: FAMILY MEDICINE

## 2024-02-15 PROCEDURE — 3074F SYST BP LT 130 MM HG: CPT | Performed by: FAMILY MEDICINE

## 2024-02-15 RX ORDER — HYDROCODONE BITARTRATE AND ACETAMINOPHEN 5; 325 MG/1; MG/1
1 TABLET ORAL EVERY 8 HOURS PRN
Qty: 90 TABLET | Refills: 0 | Status: SHIPPED | OUTPATIENT
Start: 2024-02-24 | End: 2024-03-25

## 2024-02-15 NOTE — PROGRESS NOTES
Subjective:      Patient ID: Dominguez Correia is a 73 y.o. male.  CC: Patient presents for re-evaluation of chronic health problems including osteoarthritis, pain management, hypertension and smoking.    HPI Pt is here for a follow up, with no change of health care since last appointment time.  Patient states him and his wife are down smoking less than half a pack of cigarettes per day.  His work hours are picking up at the [employer] as he works seasonal hours.  He continues take pain pills 3 times a day he feels this helps him out he is able to stay more active around the house.  He denies any issues with the pain medication.    Review of Systems  Patient Active Problem List   Diagnosis    Impotence of organic origin    Hyperglycemia    Mixed hyperlipidemia    Essential hypertension    Smoker unmotivated to quit    Asbestosis ([facility])    Idiopathic gout    Primary osteoarthritis involving multiple joints       Outpatient Medications Marked as Taking for the 2/15/24 encounter (Office Visit) with Costa Woodward MD   Medication Sig Dispense Refill    HYDROcodone-acetaminophen (NORCO) 5-325 MG per tablet Take 1 tablet by mouth every 8 hours as needed for Pain for up to 30 days. 90 tablet 0    atorvastatin (LIPITOR) 20 MG tablet TAKE 1 TABLET BY MOUTH ONCE  DAILY 90 tablet 3    lisinopril-hydroCHLOROthiazide (PRINZIDE;ZESTORETIC) 20-12.5 MG per tablet TAKE 1 TABLET BY MOUTH TWICE  DAILY 180 tablet 3    colchicine (COLCRYS) 0.6 MG tablet TAKE ONE TABLET BY MOUTH TWICE A DAY AS NEEDED FOR PAIN (GOUT) 60 tablet 2       No Known Allergies    Social History     Tobacco Use    Smoking status: Every Day     Current packs/day: 0.75     Average packs/day: 0.8 packs/day for 53.0 years (39.8 ttl pk-yrs)     Types: Cigarettes    Smokeless tobacco: Never   Substance Use Topics    Alcohol use: Yes     Comment: occasional alcohol use       /70 (Site: Right Upper Arm, Position: Sitting, Cuff Size: Medium Adult)   Pulse 88

## 2024-03-22 DIAGNOSIS — M15.9 PRIMARY OSTEOARTHRITIS INVOLVING MULTIPLE JOINTS: ICD-10-CM

## 2024-03-22 RX ORDER — HYDROCODONE BITARTRATE AND ACETAMINOPHEN 5; 325 MG/1; MG/1
1 TABLET ORAL EVERY 8 HOURS PRN
Qty: 90 TABLET | Refills: 0 | Status: SHIPPED | OUTPATIENT
Start: 2024-03-24 | End: 2024-04-23

## 2024-03-22 NOTE — TELEPHONE ENCOUNTER
Medication:   Requested Prescriptions     Pending Prescriptions Disp Refills    HYDROcodone-acetaminophen (NORCO) 5-325 MG per tablet 90 tablet 0     Sig: Take 1 tablet by mouth every 8 hours as needed for Pain for up to 30 days.      Last Filled:  2/24/2024    Patient Phone Number: [phone] (home)     Last appt: 2/15/2024   Next appt: 5/16/2024    Last OARRS:       4/9/2019     5:31 PM   RX Monitoring   Attestation The Prescription Monitoring Report for this patient was reviewed today.     PDMP Monitoring:    Last PDMP Ted as Reviewed ([place]):  Review User Review Instant Review Result   BRIGITTE MCBRIDE 2/15/2024  1:15 PM Reviewed PDMP [1]     Preferred Pharmacy:   [facility] [id number] - [place], [place] - [address] [facility] [phone] - F [phone]  8000 [facility] [address]  Phone: [phone] Fax: [phone]

## 2024-03-22 NOTE — TELEPHONE ENCOUNTER
HYDROcodone-acetaminophen (NORCO) 5-325 MG per tablet     [facility] [id number] - [place], [place] - [address] [facility] -  [phone] - F [phone] 8000 [facility] [address]  Phone: [phone]  Fax: [phone]

## 2024-04-22 DIAGNOSIS — M15.9 PRIMARY OSTEOARTHRITIS INVOLVING MULTIPLE JOINTS: ICD-10-CM

## 2024-04-22 RX ORDER — HYDROCODONE BITARTRATE AND ACETAMINOPHEN 5; 325 MG/1; MG/1
1 TABLET ORAL EVERY 8 HOURS PRN
Qty: 90 TABLET | Refills: 0 | Status: CANCELLED | OUTPATIENT
Start: 2024-04-22 | End: 2024-05-22

## 2024-04-22 RX ORDER — HYDROCODONE BITARTRATE AND ACETAMINOPHEN 5; 325 MG/1; MG/1
1 TABLET ORAL EVERY 8 HOURS PRN
Qty: 90 TABLET | Refills: 0 | Status: SHIPPED | OUTPATIENT
Start: 2024-04-22 | End: 2024-05-22

## 2024-04-22 NOTE — TELEPHONE ENCOUNTER
Medication:   Requested Prescriptions     Pending Prescriptions Disp Refills    HYDROcodone-acetaminophen (NORCO) 5-325 MG per tablet 90 tablet 0     Sig: Take 1 tablet by mouth every 8 hours as needed for Pain for up to 30 days.      Last Filled:  03/24/24    Patient Phone Number: [phone] (home)     Last appt: 2/15/2024   Next appt: 5/16/2024    Last OARRS:       4/9/2019     5:31 PM   RX Monitoring   Attestation The Prescription Monitoring Report for this patient was reviewed today.     PDMP Monitoring:    Last PDMP Ted as Reviewed ([place]):  Review User Review Instant Review Result   STUART LEYVA 3/22/2024 12:09 PM Reviewed PDMP [1]     Preferred Pharmacy:   [facility] [id number] - [place], [place] - [address] [facility] -  [phone] - F [phone]  [address] [facility] [address]  Phone: [phone] Fax: [phone]    [facility] [id number] - [facility] [address]  [place] [phone] - F [phone]  [address]  [facility] [address]  Phone: [phone] Fax: [phone]    [facility] - [place], [place] - [address] - P [phone] - F [phone]  [address] [address]  Phone: [phone] Fax: [phone]    [facility] #[id number] - [place], [place] - [address] [facility] - P [phone] - F [phone] [address] [facility] [address]  Phone: [phone] Fax: [phone]

## 2024-04-23 DIAGNOSIS — M10.9 ACUTE GOUT INVOLVING TOE OF RIGHT FOOT, UNSPECIFIED CAUSE: ICD-10-CM

## 2024-04-23 RX ORDER — COLCHICINE 0.6 MG/1
TABLET ORAL
Qty: 60 TABLET | Refills: 1 | Status: SHIPPED | OUTPATIENT
Start: 2024-04-23

## 2024-04-23 NOTE — TELEPHONE ENCOUNTER
colchicine (COLCRYS) 0.6 MG tablet [[id number]]     [facility] [id number] - [place], [place] - [address] [facility] -  [phone] - F [phone] 8000 [facility] [address]  Phone: [phone]  Fax: [phone]

## 2024-05-16 ENCOUNTER — OFFICE VISIT (OUTPATIENT)
Dept: FAMILY MEDICINE CLINIC | Age: 74
End: 2024-05-16

## 2024-05-16 VITALS
SYSTOLIC BLOOD PRESSURE: 110 MMHG | TEMPERATURE: 97.9 F | RESPIRATION RATE: 12 BRPM | WEIGHT: 169.13 LBS | OXYGEN SATURATION: 97 % | BODY MASS INDEX: 23.59 KG/M2 | HEART RATE: 87 BPM | DIASTOLIC BLOOD PRESSURE: 70 MMHG

## 2024-05-16 DIAGNOSIS — I10 ESSENTIAL HYPERTENSION: ICD-10-CM

## 2024-05-16 DIAGNOSIS — M15.9 PRIMARY OSTEOARTHRITIS INVOLVING MULTIPLE JOINTS: Primary | ICD-10-CM

## 2024-05-16 DIAGNOSIS — M10.00 IDIOPATHIC GOUT, UNSPECIFIED CHRONICITY, UNSPECIFIED SITE: ICD-10-CM

## 2024-05-16 DIAGNOSIS — F17.200 SMOKER UNMOTIVATED TO QUIT: ICD-10-CM

## 2024-05-16 RX ORDER — HYDROCODONE BITARTRATE AND ACETAMINOPHEN 5; 325 MG/1; MG/1
1 TABLET ORAL EVERY 8 HOURS PRN
Qty: 90 TABLET | Refills: 0 | Status: SHIPPED | OUTPATIENT
Start: 2024-05-16 | End: 2024-06-15

## 2024-05-16 SDOH — ECONOMIC STABILITY: FOOD INSECURITY: WITHIN THE PAST 12 MONTHS, THE FOOD YOU BOUGHT JUST DIDN'T LAST AND YOU DIDN'T HAVE MONEY TO GET MORE.: NEVER TRUE

## 2024-05-16 SDOH — ECONOMIC STABILITY: FOOD INSECURITY: WITHIN THE PAST 12 MONTHS, YOU WORRIED THAT YOUR FOOD WOULD RUN OUT BEFORE YOU GOT MONEY TO BUY MORE.: NEVER TRUE

## 2024-05-16 SDOH — ECONOMIC STABILITY: INCOME INSECURITY: HOW HARD IS IT FOR YOU TO PAY FOR THE VERY BASICS LIKE FOOD, HOUSING, MEDICAL CARE, AND HEATING?: NOT HARD AT ALL

## 2024-05-16 NOTE — PROGRESS NOTES
Subjective   Patient ID: Dominguez Correia is a 74 y.o. male.  CC: Patient presents for re-evaluation of chronic health problems including osteoarthritis, hypertension and gout.    HPI pt is here for a follow up, med refill.  Patient states he suddenly was having pain on the inner aspect of the right ankle.  He ultimately took colchicine medication and his pain symptoms improved.  He continues taking pain medication 3 times a day.  He still working at [employer] on a part-time basis for about 20 hours a week.  He is having more issues with balance and he knows this because his back is stiffer.    Review of Systems     Patient Active Problem List   Diagnosis    Impotence of organic origin    Hyperglycemia    Mixed hyperlipidemia    Essential hypertension    Smoker unmotivated to quit    Asbestosis (HCC)    Idiopathic gout    Primary osteoarthritis involving multiple joints       Outpatient Medications Marked as Taking for the 5/16/24 encounter (Office Visit) with Costa Woodward MD   Medication Sig Dispense Refill    colchicine (COLCRYS) 0.6 MG tablet TAKE ONE TABLET BY MOUTH TWICE A DAY AS NEEDED FOR PAIN (GOUT) 60 tablet 1    HYDROcodone-acetaminophen (NORCO) 5-325 MG per tablet Take 1 tablet by mouth every 8 hours as needed for Pain for up to 30 days. 90 tablet 0    atorvastatin (LIPITOR) 20 MG tablet TAKE 1 TABLET BY MOUTH ONCE  DAILY 90 tablet 3    lisinopril-hydroCHLOROthiazide (PRINZIDE;ZESTORETIC) 20-12.5 MG per tablet TAKE 1 TABLET BY MOUTH TWICE  DAILY 180 tablet 3       No Known Allergies    Social History     Tobacco Use    Smoking status: Every Day     Current packs/day: 0.75     Average packs/day: 0.8 packs/day for 53.0 years (39.8 ttl pk-yrs)     Types: Cigarettes    Smokeless tobacco: Never   Substance Use Topics    Alcohol use: Yes     Comment: occasional alcohol use       /70 (Site: Right Upper Arm, Position: Sitting, Cuff Size: Large Adult)   Pulse 87   Temp 97.9 °F (36.6 °C) (Infrared)

## 2024-06-17 DIAGNOSIS — M15.9 PRIMARY OSTEOARTHRITIS INVOLVING MULTIPLE JOINTS: ICD-10-CM

## 2024-06-17 RX ORDER — HYDROCODONE BITARTRATE AND ACETAMINOPHEN 5; 325 MG/1; MG/1
1 TABLET ORAL EVERY 8 HOURS PRN
Qty: 90 TABLET | Refills: 0 | Status: SHIPPED | OUTPATIENT
Start: 2024-06-19 | End: 2024-07-19

## 2024-06-17 NOTE — TELEPHONE ENCOUNTER
HYDROcodone-acetaminophen (NORCO) 5-325 MG per tablet     [facility] [id number] - [place], [place] - [address] [facility] -  [phone] - F [phone] 8000 [facility] [address]  Phone: [phone]  Fax: [phone]

## 2024-06-17 NOTE — TELEPHONE ENCOUNTER
Medication:   Requested Prescriptions     Pending Prescriptions Disp Refills    HYDROcodone-acetaminophen (NORCO) 5-325 MG per tablet 90 tablet 0     Sig: Take 1 tablet by mouth every 8 hours as needed for Pain for up to 30 days.      Last Filled:  5/16/2024    Patient Phone Number: [phone] (home)     Last appt: 5/16/2024   Next appt: 7/22/2024    Last OARRS:       4/9/2019     5:31 PM   RX Monitoring   Attestation The Prescription Monitoring Report for this patient was reviewed today.     PDMP Monitoring:    Last PDMP Ted as Reviewed ([place]):  Review User Review Instant Review Result   BRIGITTE MCBRIDE 5/16/2024  2:37 PM Reviewed PDMP [1]     Preferred Pharmacy:   [facility] [id number] - [place], [place] - [address] [facility] [phone] - F [phone]  [address] [facility] [address]  Phone: [phone] Fax: [phone]

## 2024-07-19 DIAGNOSIS — M15.9 PRIMARY OSTEOARTHRITIS INVOLVING MULTIPLE JOINTS: ICD-10-CM

## 2024-07-19 RX ORDER — HYDROCODONE BITARTRATE AND ACETAMINOPHEN 5; 325 MG/1; MG/1
1 TABLET ORAL EVERY 8 HOURS PRN
Qty: 90 TABLET | Refills: 0 | Status: SHIPPED | OUTPATIENT
Start: 2024-07-19 | End: 2024-08-18

## 2024-07-19 NOTE — TELEPHONE ENCOUNTER
Medication:   Requested Prescriptions     Pending Prescriptions Disp Refills    HYDROcodone-acetaminophen (NORCO) 5-325 MG per tablet 90 tablet 0     Sig: Take 1 tablet by mouth every 8 hours as needed for Pain for up to 30 days.      Last Filled:  6/19    Patient Phone Number: [phone] (home)     Last appt: 5/16/2024   Next appt: 7/22/2024    Last OARRS:       4/9/2019     5:31 PM   RX Monitoring   Attestation The Prescription Monitoring Report for this patient was reviewed today.     PDMP Monitoring:    Last PDMP Ted as Reviewed ([place]):  Review User Review Instant Review Result   STUART LEYVA 6/17/2024 11:10 AM Reviewed PDMP [1]     Preferred Pharmacy:   [facility] [id number] - [place], [place] - [address] [facility] -  [phone] - F [phone]  [address] [facility] [address]  Phone: [phone] Fax: [phone]    [facility] [id number] - [place] [address]  [place] [phone] - F [phone]  [address]  [facility] [address]  Phone: [phone] Fax: [phone]    [facility] - [place], [place] - [address] - P [phone] - F [phone]  [address] [address]  Phone: [phone] Fax: [phone]    [facility] #[id number] - [place], [place] - [address] [facility] - P [phone] - F [phone] [address] [facility] [address]  Phone: [phone] Fax: [phone]

## 2024-07-22 ENCOUNTER — OFFICE VISIT (OUTPATIENT)
Dept: FAMILY MEDICINE CLINIC | Age: 74
End: 2024-07-22
Payer: MEDICARE

## 2024-07-22 VITALS
OXYGEN SATURATION: 98 % | BODY MASS INDEX: 23.87 KG/M2 | RESPIRATION RATE: 12 BRPM | SYSTOLIC BLOOD PRESSURE: 102 MMHG | TEMPERATURE: 97.2 F | WEIGHT: 171.13 LBS | HEART RATE: 85 BPM | DIASTOLIC BLOOD PRESSURE: 68 MMHG

## 2024-07-22 DIAGNOSIS — Z00.00 MEDICARE ANNUAL WELLNESS VISIT, SUBSEQUENT: Primary | ICD-10-CM

## 2024-07-22 DIAGNOSIS — M10.9 ACUTE GOUT INVOLVING TOE OF RIGHT FOOT, UNSPECIFIED CAUSE: ICD-10-CM

## 2024-07-22 DIAGNOSIS — R73.9 HYPERGLYCEMIA: ICD-10-CM

## 2024-07-22 DIAGNOSIS — Z12.5 SCREENING PSA (PROSTATE SPECIFIC ANTIGEN): ICD-10-CM

## 2024-07-22 DIAGNOSIS — Z87.891 PERSONAL HISTORY OF TOBACCO USE: ICD-10-CM

## 2024-07-22 DIAGNOSIS — I10 ESSENTIAL HYPERTENSION: ICD-10-CM

## 2024-07-22 DIAGNOSIS — E78.2 MIXED HYPERLIPIDEMIA: ICD-10-CM

## 2024-07-22 PROCEDURE — G0439 PPPS, SUBSEQ VISIT: HCPCS | Performed by: FAMILY MEDICINE

## 2024-07-22 PROCEDURE — 99214 OFFICE O/P EST MOD 30 MIN: CPT | Performed by: FAMILY MEDICINE

## 2024-07-22 PROCEDURE — 1123F ACP DISCUSS/DSCN MKR DOCD: CPT | Performed by: FAMILY MEDICINE

## 2024-07-22 PROCEDURE — G0296 VISIT TO DETERM LDCT ELIG: HCPCS | Performed by: FAMILY MEDICINE

## 2024-07-22 PROCEDURE — 3078F DIAST BP <80 MM HG: CPT | Performed by: FAMILY MEDICINE

## 2024-07-22 PROCEDURE — 3074F SYST BP LT 130 MM HG: CPT | Performed by: FAMILY MEDICINE

## 2024-07-22 RX ORDER — COLCHICINE 0.6 MG/1
TABLET ORAL
Qty: 60 TABLET | Refills: 1 | Status: SHIPPED | OUTPATIENT
Start: 2024-07-22

## 2024-07-23 NOTE — PROGRESS NOTES
Result   BRIGITTE MCBRIDE 7/22/2024  2:51 PM     Reviewed PDMP [1]     Last Controlled Substance Monitoring Documentation      Flowsheet Row Office Visit from 4/9/2019 in [facility]   Attestation The Prescription Monitoring Report for this patient was reviewed today. filed at 04/09/2019 1731               Inactivity:  On average, how many days per week do you engage in moderate to strenuous exercise (like a brisk walk)?: 0 days (!) Abnormal  On average, how many minutes do you engage in exercise at this level?: 0 min    Interventions:  Patient declined any further interventions or treatment        Vision Screen:  Do you have difficulty driving, watching TV, or doing any of your daily activities because of your eyesight?: No  Have you had an eye exam within the past year?: (!) No    Interventions:   Patient encouraged to make appointment with their eye specialist       Tobacco Use:    Tobacco Use      Smoking status: Every Day        Packs/day: 0.75        Years: 0.8 packs/day for 53.0 years (39.8 ttl pk-yrs)        Types: Cigarettes      Smokeless tobacco: Never     Interventions:  Patient declined any further intervention or treatment                      Objective   Vitals:    07/22/24 1427   BP: 102/68   Site: Right Upper Arm   Position: Sitting   Cuff Size: Medium Adult   Pulse: 85   Resp: 12   Temp: 97.2 °F (36.2 °C)   TempSrc: Infrared   SpO2: 98%   Weight: 77.6 kg (171 lb 2 oz)      Body mass index is 23.87 kg/m².        General Appearance: alert and oriented to person, place and time, well-developed and well-nourished, in no acute distress  Skin: no suspicious lesions noted  ENT: tympanic membrane, external ear and ear canal normal bilaterally, oropharynx clear and moist with normal mucous membranes  Neck: neck supple and non tender without mass, no thyromegaly or thyroid nodules, no cervical lymphadenopathy   Pulmonary/Chest: clear to auscultation bilaterally- no wheezes, rales or

## 2024-07-23 NOTE — PATIENT INSTRUCTIONS
Learning About Being Active as an Older Adult  Why is being active important as you get older?     Being active is one of the best things you can do for your health. And it's never too late to start. Being active--or getting active, if you aren't already--has definite benefits. It can:  Give you more energy,  Keep your mind sharp.  Improve balance to reduce your risk of falls.  Help you manage chronic illness with fewer medicines.  No matter how old you are, how fit you are, or what health problems you have, there is a form of activity that will work for you. And the more physical activity you can do, the better your overall health will be.  What kinds of activity can help you stay healthy?  Being more active will make your daily activities easier. Physical activity includes planned exercise and things you do in daily life. There are four types of activity:  Aerobic.  Doing aerobic activity makes your heart and lungs strong.  Includes walking, dancing, and gardening.  Aim for at least 2½ hours spread throughout the week.  It improves your energy and can help you sleep better.  Muscle-strengthening.  This type of activity can help maintain muscle and strengthen bones.  Includes climbing stairs, using resistance bands, and lifting or carrying heavy loads.  Aim for at least twice a week.  It can help protect the knees and other joints.  Stretching.  Stretching gives you better range of motion in joints and muscles.  Includes upper arm stretches, calf stretches, and gentle yoga.  Aim for at least twice a week, preferably after your muscles are warmed up from other activities.  It can help you function better in daily life.  Balancing.  This helps you stay coordinated and have good posture.  Includes heel-to-toe walking, motnana chi, and certain types of yoga.  Aim for at least 3 days a week.  It can reduce your risk of falling.  Even if you have a hard time meeting the recommendations, it's better to be more active

## 2024-08-19 DIAGNOSIS — M15.9 PRIMARY OSTEOARTHRITIS INVOLVING MULTIPLE JOINTS: ICD-10-CM

## 2024-08-19 RX ORDER — HYDROCODONE BITARTRATE AND ACETAMINOPHEN 5; 325 MG/1; MG/1
1 TABLET ORAL EVERY 8 HOURS PRN
Qty: 90 TABLET | Refills: 0 | Status: SHIPPED | OUTPATIENT
Start: 2024-08-19 | End: 2024-09-18

## 2024-08-19 NOTE — TELEPHONE ENCOUNTER
HYDROcodone-acetaminophen (NORCO) 5-325 MG per tablet     [facility] [id number] - [place], [place] - [address] [facility] -  [phone] - F [phone] [address] [facility] [address]  Phone: [phone]  Fax: [phone]

## 2024-08-19 NOTE — TELEPHONE ENCOUNTER
Medication:   Requested Prescriptions      No prescriptions requested or ordered in this encounter      Last Filled:  7/19/2024    Patient Phone Number: [phone] (home)     Last appt: 7/22/2024   Next appt: 9/5/2024    Last OARRS:       4/9/2019     5:31 PM   RX Monitoring   Attestation The Prescription Monitoring Report for this patient was reviewed today.     PDMP Monitoring:    Last PDMP Ted as Reviewed ([place]):  Review User Review Instant Review Result   MABRIGITTE CHENG 7/22/2024  2:51 PM Reviewed PDMP [1]     Preferred Pharmacy:   [facility] [id number] [place], [place] - [address] [facility] [phone] - F [phone]  [address] [facility] [address]  Phone: [phone] Fax: [phone]    [facility] [id number] - [place], [place] - [address]  [place] [phone] - F [phone]  [address]  [facility] [address]  Phone: [phone] Fax: [phone]    [facility] - [facility] [address] -  [phone] - F [phone]  [address]  [facility] [address]  Phone: [phone] Fax: [phone]    [facility] #[id number] - [place], [place] - [address] [facility] - P [phone] - F [phone]  [address] [facility] [address]  Phone: [phone] Fax: [phone]

## 2024-09-18 DIAGNOSIS — M15.9 PRIMARY OSTEOARTHRITIS INVOLVING MULTIPLE JOINTS: ICD-10-CM

## 2024-09-18 RX ORDER — HYDROCODONE BITARTRATE AND ACETAMINOPHEN 5; 325 MG/1; MG/1
1 TABLET ORAL EVERY 8 HOURS PRN
Qty: 90 TABLET | Refills: 0 | Status: SHIPPED | OUTPATIENT
Start: 2024-09-18 | End: 2024-10-18

## 2024-10-08 DIAGNOSIS — I10 ESSENTIAL HYPERTENSION: ICD-10-CM

## 2024-10-09 RX ORDER — LISINOPRIL AND HYDROCHLOROTHIAZIDE 12.5; 2 MG/1; MG/1
1 TABLET ORAL 2 TIMES DAILY
Qty: 180 TABLET | Refills: 0 | Status: SHIPPED | OUTPATIENT
Start: 2024-10-09

## 2024-10-09 RX ORDER — ATORVASTATIN CALCIUM 20 MG/1
TABLET, FILM COATED ORAL
Qty: 90 TABLET | Refills: 0 | Status: SHIPPED | OUTPATIENT
Start: 2024-10-09

## 2024-10-17 DIAGNOSIS — M15.0 PRIMARY OSTEOARTHRITIS INVOLVING MULTIPLE JOINTS: ICD-10-CM

## 2024-10-17 RX ORDER — HYDROCODONE BITARTRATE AND ACETAMINOPHEN 5; 325 MG/1; MG/1
1 TABLET ORAL EVERY 8 HOURS PRN
Qty: 90 TABLET | Refills: 0 | Status: SHIPPED | OUTPATIENT
Start: 2024-10-17 | End: 2024-11-16

## 2024-10-17 NOTE — TELEPHONE ENCOUNTER
Medication:   Requested Prescriptions     Pending Prescriptions Disp Refills    HYDROcodone-acetaminophen (NORCO) 5-325 MG per tablet 90 tablet 0     Sig: Take 1 tablet by mouth every 8 hours as needed for Pain for up to 30 days.      Last Filled:  9/18    Patient Phone Number: [phone] (home)     Last appt: 7/22/2024   Next appt: 10/22/2024    Last OARRS:       4/9/2019     5:31 PM   RX Monitoring   Attestation The Prescription Monitoring Report for this patient was reviewed today.     PDMP Monitoring:    Last PDMP Ted as Reviewed ([place]):  Review User Review Instant Review Result   BRIGITTE MCBRIDE 7/22/2024  2:51 PM Reviewed PDMP [1]     Preferred Pharmacy:   [facility] [id number] - [place], [place] - [address] [facility] -  [phone] - F [phone]  [address] [facility] [address]  Phone: [phone] Fax: [phone]    [facility] [id number] - [facility] [address]  [place] [phone] - F [phone]  [facility] [address]  [facility] [address]  Phone: [phone] Fax: [phone]    [facility] - [place], [place] - [address] - P [phone] - F [phone]  [address] [address]  Phone: [phone] Fax: [phone]    [facility] #[id number] - [place], [place] - [address] [facility] - P [phone] - F [phone] [address] [facility] [address]  Phone: [phone] Fax: [phone]

## 2024-10-21 ENCOUNTER — HOSPITAL ENCOUNTER (OUTPATIENT)
Age: 74
Discharge: HOME | End: 2024-10-21
Payer: MEDICARE

## 2024-10-21 DIAGNOSIS — Z12.5 SCREENING PSA (PROSTATE SPECIFIC ANTIGEN): ICD-10-CM

## 2024-10-21 DIAGNOSIS — I10 ESSENTIAL HYPERTENSION: ICD-10-CM

## 2024-10-21 DIAGNOSIS — R73.9 HYPERGLYCEMIA: ICD-10-CM

## 2024-10-21 DIAGNOSIS — E78.2 MIXED HYPERLIPIDEMIA: ICD-10-CM

## 2024-10-21 LAB
ALBUMIN SERPL-MCNC: 4.4 G/DL (ref 3.4–5)
ALBUMIN/GLOB SERPL: 1.9 {RATIO} (ref 1.1–2.2)
ALP SERPL-CCNC: 118 U/L (ref 40–129)
ALT SERPL-CCNC: 13 U/L (ref 10–40)
ANION GAP SERPL CALCULATED.3IONS-SCNC: 10 MMOL/L (ref 3–16)
AST SERPL-CCNC: 21 U/L (ref 15–37)
BILIRUB SERPL-MCNC: 0.3 MG/DL (ref 0–1)
BUN SERPL-MCNC: 35 MG/DL (ref 7–20)
CALCIUM SERPL-MCNC: 8.8 MG/DL (ref 8.3–10.6)
CHLORIDE SERPL-SCNC: 104 MMOL/L (ref 99–110)
CHOLEST SERPL-MCNC: 148 MG/DL (ref 0–199)
CO2 SERPL-SCNC: 24 MMOL/L (ref 21–32)
CREAT SERPL-MCNC: 1.1 MG/DL (ref 0.8–1.3)
DEPRECATED RDW RBC AUTO: 15.2 % (ref 12.4–15.4)
EST. AVERAGE GLUCOSE BLD GHB EST-MCNC: 116.9 MG/DL
GFR SERPLBLD CREATININE-BSD FMLA CKD-EPI: 71 ML/MIN/{1.73_M2}
GLUCOSE SERPL-MCNC: 113 MG/DL (ref 70–99)
HBA1C MFR BLD: 5.7 %
HCT VFR BLD AUTO: 37.8 % (ref 40.5–52.5)
HDLC SERPL-MCNC: 56 MG/DL (ref 40–60)
HGB BLD-MCNC: 13.3 G/DL (ref 13.5–17.5)
LDLC SERPL CALC-MCNC: 77 MG/DL
MCH RBC QN AUTO: 37.9 PG (ref 26–34)
MCHC RBC AUTO-ENTMCNC: 35.1 G/DL (ref 31–36)
MCV RBC AUTO: 108.1 FL (ref 80–100)
PLATELET # BLD AUTO: 159 K/UL (ref 135–450)
PMV BLD AUTO: 9.2 FL (ref 5–10.5)
POTASSIUM SERPL-SCNC: 4.9 MMOL/L (ref 3.5–5.1)
PROT SERPL-MCNC: 6.7 G/DL (ref 6.4–8.2)
PSA SERPL DL<=0.01 NG/ML-MCNC: 1.39 NG/ML (ref 0–4)
RBC # BLD AUTO: 3.5 M/UL (ref 4.2–5.9)
SODIUM SERPL-SCNC: 138 MMOL/L (ref 136–145)
TRIGL SERPL-MCNC: 75 MG/DL (ref 0–150)
VLDLC SERPL CALC-MCNC: 15 MG/DL
WBC # BLD AUTO: 7.4 K/UL (ref 4–11)

## 2024-10-21 PROCEDURE — 84153 ASSAY OF PSA TOTAL: CPT

## 2024-10-21 PROCEDURE — G0103 PSA SCREENING: HCPCS

## 2024-10-21 PROCEDURE — 85027 COMPLETE CBC AUTOMATED: CPT

## 2024-10-21 PROCEDURE — 36415 COLL VENOUS BLD VENIPUNCTURE: CPT

## 2024-10-21 PROCEDURE — 80053 COMPREHEN METABOLIC PANEL: CPT

## 2024-10-21 PROCEDURE — 80061 LIPID PANEL: CPT

## 2024-10-21 PROCEDURE — 83036 HEMOGLOBIN GLYCOSYLATED A1C: CPT

## 2024-10-22 ENCOUNTER — OFFICE VISIT (OUTPATIENT)
Dept: FAMILY MEDICINE CLINIC | Age: 74
End: 2024-10-22

## 2024-10-22 VITALS
SYSTOLIC BLOOD PRESSURE: 126 MMHG | OXYGEN SATURATION: 96 % | WEIGHT: 170 LBS | RESPIRATION RATE: 12 BRPM | BODY MASS INDEX: 23.8 KG/M2 | HEIGHT: 71 IN | DIASTOLIC BLOOD PRESSURE: 72 MMHG | HEART RATE: 75 BPM | TEMPERATURE: 97.8 F

## 2024-10-22 DIAGNOSIS — E78.2 MIXED HYPERLIPIDEMIA: ICD-10-CM

## 2024-10-22 DIAGNOSIS — Z23 NEED FOR INFLUENZA VACCINATION: ICD-10-CM

## 2024-10-22 DIAGNOSIS — J61 ASBESTOSIS (HCC): ICD-10-CM

## 2024-10-22 DIAGNOSIS — D53.9 MACROCYTIC ANEMIA: ICD-10-CM

## 2024-10-22 DIAGNOSIS — R29.898 COMPLAINTS OF LEG WEAKNESS: Primary | ICD-10-CM

## 2024-10-22 DIAGNOSIS — M15.0 PRIMARY OSTEOARTHRITIS INVOLVING MULTIPLE JOINTS: ICD-10-CM

## 2024-10-22 DIAGNOSIS — R73.9 HYPERGLYCEMIA: ICD-10-CM

## 2024-10-22 NOTE — PROGRESS NOTES
Vaccine Information Sheet, \"Influenza - Inactivated\"  given to Dominguez Correia, or parent/legal guardian of  Dominguez Correia and verbalized understanding.    Patient responses:    Have you ever had a reaction to a flu vaccine? No  Are you able to eat eggs without adverse effects?  Yes  Do you have any current illness?  No  Have you ever had Guillian Ardmore Syndrome?  No    Flu vaccine given per order. Please see immunization tab.    Immunization(s) given during visit:     Immunizations Administered       Name Date Dose Route    Influenza, FLUAD, (age 65 y+), IM, Trivalent PF, 0.5mL 10/22/2024 0.5 mL Intramuscular    Site: Deltoid- Right    Lot: [id number]    NDC: 45650-570-42

## 2024-10-22 NOTE — PROGRESS NOTES
Subjective   Patient ID: Dominguez Correia is a 74 y.o. male.  CC: Patient presents for re-evaluation of chronic health problems including leg weakness, history of asbestosis exposure, hyperlipidemia and arthritis..    HPI Pt is here for a follow up, test results.  Patient feels his legs are becoming weaker.  He is able to walk and do his activity but he does not feel he has the same strength nor does he have the same balance.  He continues to eat well denies any abdominal pain but he has lost additional weight.  He unfortunately still smoking a half a pack of cigarettes per day.  Patient does have history of asbestosis exposure in the past and has not had a CT scan for some time.    Review of Systems     Patient Active Problem List   Diagnosis    Impotence of organic origin    Hyperglycemia    Mixed hyperlipidemia    Essential hypertension    Smoker unmotivated to quit    Asbestosis (HCC)    Idiopathic gout    Primary osteoarthritis involving multiple joints       Outpatient Medications Marked as Taking for the 10/22/24 encounter (Office Visit) with Costa Woodward MD   Medication Sig Dispense Refill    HYDROcodone-acetaminophen (NORCO) 5-325 MG per tablet Take 1 tablet by mouth every 8 hours as needed for Pain for up to 30 days. 90 tablet 0    atorvastatin (LIPITOR) 20 MG tablet TAKE 1 TABLET BY MOUTH ONCE  DAILY 90 tablet 0    lisinopril-hydroCHLOROthiazide (PRINZIDE;ZESTORETIC) 20-12.5 MG per tablet TAKE 1 TABLET BY MOUTH TWICE  DAILY 180 tablet 0    colchicine (COLCRYS) 0.6 MG tablet TAKE ONE TABLET BY MOUTH TWICE A DAY AS NEEDED FOR PAIN (GOUT) 60 tablet 1       No Known Allergies    Social History     Tobacco Use    Smoking status: Every Day     Current packs/day: 0.75     Average packs/day: 0.8 packs/day for 53.0 years (39.8 ttl pk-yrs)     Types: Cigarettes    Smokeless tobacco: Never   Substance Use Topics    Alcohol use: Yes     Comment: occasional alcohol use       /72 (Site: Right Upper Arm,

## 2024-10-30 ENCOUNTER — HOSPITAL ENCOUNTER (OUTPATIENT)
Age: 74
Discharge: HOME | End: 2024-10-30
Payer: MEDICARE

## 2024-10-30 DIAGNOSIS — D53.9 MACROCYTIC ANEMIA: ICD-10-CM

## 2024-10-30 DIAGNOSIS — R29.898 COMPLAINTS OF LEG WEAKNESS: ICD-10-CM

## 2024-10-30 LAB
CRP SERPL-MCNC: 4.9 MG/L (ref 0–5.1)
FOLATE SERPL-MCNC: 36.4 NG/ML (ref 4.78–24.2)
VIT B12 SERPL-MCNC: <150 PG/ML (ref 211–911)

## 2024-10-30 PROCEDURE — 86140 C-REACTIVE PROTEIN: CPT

## 2024-10-30 PROCEDURE — 36415 COLL VENOUS BLD VENIPUNCTURE: CPT

## 2024-10-30 PROCEDURE — 82746 ASSAY OF FOLIC ACID SERUM: CPT

## 2024-10-30 PROCEDURE — 82607 VITAMIN B-12: CPT

## 2024-11-01 ENCOUNTER — TELEPHONE (OUTPATIENT)
Dept: FAMILY MEDICINE CLINIC | Age: 74
End: 2024-11-01

## 2024-11-05 ENCOUNTER — TELEPHONE (OUTPATIENT)
Dept: FAMILY MEDICINE CLINIC | Age: 74
End: 2024-11-05

## 2024-11-05 ENCOUNTER — OFFICE VISIT (OUTPATIENT)
Dept: FAMILY MEDICINE CLINIC | Age: 74
End: 2024-11-05
Payer: MEDICARE

## 2024-11-05 DIAGNOSIS — R79.89 LOW VITAMIN B12 LEVEL: Primary | ICD-10-CM

## 2024-11-05 PROCEDURE — 99211 OFF/OP EST MAY X REQ PHY/QHP: CPT | Performed by: FAMILY MEDICINE

## 2024-11-05 PROCEDURE — 96372 THER/PROPH/DIAG INJ SC/IM: CPT | Performed by: FAMILY MEDICINE

## 2024-11-05 RX ORDER — CYANOCOBALAMIN 1000 UG/ML
1000 INJECTION, SOLUTION INTRAMUSCULAR; SUBCUTANEOUS ONCE
Status: COMPLETED | OUTPATIENT
Start: 2024-11-05 | End: 2024-11-05

## 2024-11-05 RX ADMIN — CYANOCOBALAMIN 1000 MCG: 1000 INJECTION, SOLUTION INTRAMUSCULAR; SUBCUTANEOUS at 14:39

## 2024-11-05 NOTE — TELEPHONE ENCOUNTER
Micheline needs to know when he needs to return for another B12 shot?    Please call her at [phone]    Please advise

## 2024-11-05 NOTE — TELEPHONE ENCOUNTER
Spoke with pt's wife per his request. Appt for b-12 scheduled  Wife would like to know how soon pt feel better after shots and supplements

## 2024-11-05 NOTE — TELEPHONE ENCOUNTER
Patients wife Micheline called requesting the lab results for Dominguez. Please give her a call back at [phone].     Please advise.

## 2024-11-05 NOTE — PROGRESS NOTES
Medication given during visit:    Administrations This Visit       cyanocobalamin injection 1,000 mcg       Admin Date  11/05/2024  14:39 Action  Given Dose  1,000 mcg Route  IntraMUSCular Site  Deltoid Right Documented By  Jo Ann Miller    NDC: 39807-108-73    Lot#: [id number]    : [employer] [facility]    Patient Supplied?: No                    Patient instructed to remain in clinic for 20 minutes after injection and was advised to report any adverse reaction to me immediately.

## 2024-11-15 DIAGNOSIS — M15.0 PRIMARY OSTEOARTHRITIS INVOLVING MULTIPLE JOINTS: ICD-10-CM

## 2024-11-15 RX ORDER — HYDROCODONE BITARTRATE AND ACETAMINOPHEN 5; 325 MG/1; MG/1
1 TABLET ORAL EVERY 8 HOURS PRN
Qty: 90 TABLET | Refills: 0 | Status: SHIPPED | OUTPATIENT
Start: 2024-11-16 | End: 2024-12-16

## 2024-11-15 NOTE — TELEPHONE ENCOUNTER
HYDROcodone-acetaminophen (NORCO) 5-325 MG per tablet      [facility] [id number] - [place], [place] - [address] [facility] -  [phone] - F [phone] [address] [facility] [address]  Phone: [phone]  Fax: [phone]

## 2024-11-15 NOTE — TELEPHONE ENCOUNTER
Medication:   Requested Prescriptions     Pending Prescriptions Disp Refills    HYDROcodone-acetaminophen (NORCO) 5-325 MG per tablet 90 tablet 0     Sig: Take 1 tablet by mouth every 8 hours as needed for Pain for up to 30 days.      Last Filled:  10/17/2024    Patient Phone Number: [phone] (home)     Last appt: 11/5/2024   Next appt: 12/5/2024    Last OARRS:       4/9/2019     5:31 PM   RX Monitoring   Attestation The Prescription Monitoring Report for this patient was reviewed today.     PDMP Monitoring:    Last PDMP Ted as Reviewed ([place]):  Review User Review Instant Review Result   BRIGITTE MCBRIDE 10/22/2024  2:38 PM Reviewed PDMP [1]     Preferred Pharmacy:   [facility] [id number] - [place], [place] - [address] [facility] -  [phone] - F [phone]  [address] [facility] [address]  Phone: [phone] Fax: [phone]    [facility] [id number] - [facility] [address]  [place] [phone] - F [phone]  [address]  [facility] [address]  Phone: [phone] Fax: [phone]    [facility] - [place], [place] - [address] - P [phone] - F [phone]  [address] [address]  Phone: [phone] Fax: [phone]    [facility] #[id number] - [place], [place] - [address] [facility] - P [phone] - F [phone] 86[address] [facility] [address]  Phone: [phone] Fax: [phone]

## 2024-12-05 ENCOUNTER — OFFICE VISIT (OUTPATIENT)
Dept: FAMILY MEDICINE CLINIC | Age: 74
End: 2024-12-05
Payer: MEDICARE

## 2024-12-05 VITALS — TEMPERATURE: 96.9 F

## 2024-12-05 DIAGNOSIS — R79.89 LOW VITAMIN B12 LEVEL: Primary | ICD-10-CM

## 2024-12-05 PROCEDURE — 96372 THER/PROPH/DIAG INJ SC/IM: CPT | Performed by: FAMILY MEDICINE

## 2024-12-05 PROCEDURE — G8428 CUR MEDS NOT DOCUMENT: HCPCS | Performed by: FAMILY MEDICINE

## 2024-12-05 PROCEDURE — G8420 CALC BMI NORM PARAMETERS: HCPCS | Performed by: FAMILY MEDICINE

## 2024-12-05 PROCEDURE — 99211 OFF/OP EST MAY X REQ PHY/QHP: CPT | Performed by: FAMILY MEDICINE

## 2024-12-05 RX ORDER — CYANOCOBALAMIN 1000 UG/ML
1000 INJECTION, SOLUTION INTRAMUSCULAR; SUBCUTANEOUS ONCE
Status: COMPLETED | OUTPATIENT
Start: 2024-12-05 | End: 2024-12-05

## 2024-12-05 RX ADMIN — CYANOCOBALAMIN 1000 MCG: 1000 INJECTION, SOLUTION INTRAMUSCULAR; SUBCUTANEOUS at 10:17

## 2024-12-05 NOTE — PROGRESS NOTES
Medication given during visit:    Administrations This Visit       cyanocobalamin injection 1,000 mcg       Admin Date  12/05/2024  10:17 Action  Given Dose  1,000 mcg Route  IntraMUSCular Site  Deltoid Right Documented By  Jo Ann Miller    NDC: 11667-085-92    Lot#: [id number]    : [employer] [facility]    Patient Supplied?: No                    Patient instructed to remain in clinic for 20 minutes after injection and was advised to report any adverse reaction to me immediately.

## 2024-12-11 DIAGNOSIS — I10 ESSENTIAL HYPERTENSION: ICD-10-CM

## 2024-12-11 RX ORDER — LISINOPRIL AND HYDROCHLOROTHIAZIDE 12.5; 2 MG/1; MG/1
1 TABLET ORAL 2 TIMES DAILY
Qty: 180 TABLET | Refills: 0 | Status: SHIPPED | OUTPATIENT
Start: 2024-12-11

## 2024-12-11 RX ORDER — ATORVASTATIN CALCIUM 20 MG/1
TABLET, FILM COATED ORAL
Qty: 90 TABLET | Refills: 3 | Status: SHIPPED | OUTPATIENT
Start: 2024-12-11

## 2024-12-13 DIAGNOSIS — M15.0 PRIMARY OSTEOARTHRITIS INVOLVING MULTIPLE JOINTS: ICD-10-CM

## 2024-12-13 RX ORDER — HYDROCODONE BITARTRATE AND ACETAMINOPHEN 5; 325 MG/1; MG/1
1 TABLET ORAL EVERY 8 HOURS PRN
Qty: 90 TABLET | Refills: 0 | Status: SHIPPED | OUTPATIENT
Start: 2024-12-15 | End: 2025-01-14

## 2024-12-13 NOTE — TELEPHONE ENCOUNTER
Medication:   Requested Prescriptions     Pending Prescriptions Disp Refills    HYDROcodone-acetaminophen (NORCO) 5-325 MG per tablet 90 tablet 0     Sig: Take 1 tablet by mouth every 8 hours as needed for Pain for up to 30 days.      Last Filled:  11/16    Patient Phone Number: [phone] (home)     Last appt: 12/5/2024   Next appt: 1/2/2025    Last OARRS:       4/9/2019     5:31 PM   RX Monitoring   Attestation The Prescription Monitoring Report for this patient was reviewed today.     PDMP Monitoring:    Last PDMP Ted as Reviewed ([place]):  Review User Review Instant Review Result   STUART LEYVA 11/15/2024 11:42 AM Reviewed PDMP [1]     Preferred Pharmacy:   [facility] [id number] - [place], [place] - [address] [facility] -  [phone] - F [phone]  [address] [facility] [address]  Phone: [phone] Fax: [phone]    [facility] [id number] - [facility] [address]  [place] [phone] - F [phone]  [address]  [facility] [address]  Phone: [phone] Fax: [phone]    [facility] - [place], [place] - [address] - P [phone] - F [phone]  [address] [address]  Phone: [phone] Fax: [phone]    [facility] #[id number] - [place], [place] - [address] [facility] - P [phone] - F [phone] [address] [facility] [address]  Phone: [phone] Fax: [phone]

## 2025-01-02 ENCOUNTER — OFFICE VISIT (OUTPATIENT)
Dept: FAMILY MEDICINE CLINIC | Age: 75
End: 2025-01-02
Payer: MEDICARE

## 2025-01-02 DIAGNOSIS — R79.89 LOW VITAMIN B12 LEVEL: Primary | ICD-10-CM

## 2025-01-02 PROCEDURE — 96372 THER/PROPH/DIAG INJ SC/IM: CPT | Performed by: FAMILY MEDICINE

## 2025-01-02 PROCEDURE — G8428 CUR MEDS NOT DOCUMENT: HCPCS | Performed by: FAMILY MEDICINE

## 2025-01-02 PROCEDURE — G8420 CALC BMI NORM PARAMETERS: HCPCS | Performed by: FAMILY MEDICINE

## 2025-01-02 PROCEDURE — 99211 OFF/OP EST MAY X REQ PHY/QHP: CPT | Performed by: FAMILY MEDICINE

## 2025-01-02 RX ORDER — CYANOCOBALAMIN 1000 UG/ML
1000 INJECTION, SOLUTION INTRAMUSCULAR; SUBCUTANEOUS ONCE
Status: COMPLETED | OUTPATIENT
Start: 2025-01-02 | End: 2025-01-02

## 2025-01-02 RX ADMIN — CYANOCOBALAMIN 1000 MCG: 1000 INJECTION, SOLUTION INTRAMUSCULAR; SUBCUTANEOUS at 07:30

## 2025-01-13 ENCOUNTER — TELEPHONE (OUTPATIENT)
Dept: FAMILY MEDICINE CLINIC | Age: 75
End: 2025-01-13

## 2025-01-13 DIAGNOSIS — M15.0 PRIMARY OSTEOARTHRITIS INVOLVING MULTIPLE JOINTS: ICD-10-CM

## 2025-01-13 RX ORDER — HYDROCODONE BITARTRATE AND ACETAMINOPHEN 5; 325 MG/1; MG/1
1 TABLET ORAL EVERY 8 HOURS PRN
Qty: 90 TABLET | Refills: 0 | Status: SHIPPED | OUTPATIENT
Start: 2025-01-13 | End: 2025-02-12

## 2025-01-13 NOTE — TELEPHONE ENCOUNTER
HYDROcodone-acetaminophen (NORCO) 5-325 MG per tablet     [facility] [id number] - [place], [place] - [address] [facility] -  [phone] - F [phone] [address] [facility] [address]  Phone: [phone]  Fax: [phone]

## 2025-01-13 NOTE — TELEPHONE ENCOUNTER
Medication:   Requested Prescriptions     Pending Prescriptions Disp Refills    HYDROcodone-acetaminophen (NORCO) 5-325 MG per tablet 90 tablet 0     Sig: Take 1 tablet by mouth every 8 hours as needed for Pain for up to 30 days.      Last Filled:  12/15    Patient Phone Number: [phone] (home)     Last appt: 1/2/2025   Next appt: 1/23/2025    Last OARRS:       4/9/2019     5:31 PM   RX Monitoring   Attestation The Prescription Monitoring Report for this patient was reviewed today.     PDMP Monitoring:    Last PDMP Ted as Reviewed ([place]):  Review User Review Instant Review Result   STUART LEYVA 12/13/2024  3:12 PM Reviewed PDMP [1]     Preferred Pharmacy:   [facility] [id number] - [place], [place] - [address] [facility] -  [phone] - F [phone]  [address] [facility] [address]  Phone: [phone] Fax: [phone]    [facility] [id number] - [facility] [address]  [place] [phone] - F [phone]  [facility] [address]  [facility] [address]  Phone: [phone] Fax: [phone]    [facility] - [place], [place] - [address] - P [phone] - F [phone]  [address] [address]  Phone: [phone] Fax: [phone]    [facility] #[id number] - [place], [place] - [address] [facility] - P [phone] - F [phone] 86[address] [facility] [address]  Phone: [phone] Fax: [phone]

## 2025-01-16 ENCOUNTER — HOSPITAL ENCOUNTER (OUTPATIENT)
Age: 75
Discharge: HOME | End: 2025-01-16
Payer: MEDICARE

## 2025-01-16 LAB
FOLATE SERPL-MCNC: 18 NG/ML (ref 4.78–24.2)
VIT B12 SERPL-MCNC: 437 PG/ML (ref 211–911)

## 2025-01-16 PROCEDURE — 82746 ASSAY OF FOLIC ACID SERUM: CPT

## 2025-01-16 PROCEDURE — 36415 COLL VENOUS BLD VENIPUNCTURE: CPT

## 2025-01-16 PROCEDURE — 82607 VITAMIN B-12: CPT

## 2025-01-23 ENCOUNTER — OFFICE VISIT (OUTPATIENT)
Dept: FAMILY MEDICINE CLINIC | Age: 75
End: 2025-01-23

## 2025-01-23 VITALS
WEIGHT: 171 LBS | TEMPERATURE: 97.8 F | SYSTOLIC BLOOD PRESSURE: 136 MMHG | BODY MASS INDEX: 23.85 KG/M2 | DIASTOLIC BLOOD PRESSURE: 74 MMHG | OXYGEN SATURATION: 99 % | HEART RATE: 71 BPM

## 2025-01-23 DIAGNOSIS — M10.00 IDIOPATHIC GOUT, UNSPECIFIED CHRONICITY, UNSPECIFIED SITE: ICD-10-CM

## 2025-01-23 DIAGNOSIS — R73.03 PREDIABETES: ICD-10-CM

## 2025-01-23 DIAGNOSIS — E78.2 MIXED HYPERLIPIDEMIA: ICD-10-CM

## 2025-01-23 DIAGNOSIS — J61 ASBESTOSIS (HCC): ICD-10-CM

## 2025-01-23 DIAGNOSIS — I10 ESSENTIAL HYPERTENSION: ICD-10-CM

## 2025-01-23 DIAGNOSIS — Z00.00 MEDICARE ANNUAL WELLNESS VISIT, SUBSEQUENT: Primary | ICD-10-CM

## 2025-01-23 DIAGNOSIS — F17.200 SMOKER UNMOTIVATED TO QUIT: ICD-10-CM

## 2025-01-23 DIAGNOSIS — Z12.11 SCREENING FOR COLON CANCER: ICD-10-CM

## 2025-01-23 DIAGNOSIS — M15.0 PRIMARY OSTEOARTHRITIS INVOLVING MULTIPLE JOINTS: ICD-10-CM

## 2025-01-23 SDOH — ECONOMIC STABILITY: FOOD INSECURITY: WITHIN THE PAST 12 MONTHS, THE FOOD YOU BOUGHT JUST DIDN'T LAST AND YOU DIDN'T HAVE MONEY TO GET MORE.: NEVER TRUE

## 2025-01-23 SDOH — ECONOMIC STABILITY: FOOD INSECURITY: WITHIN THE PAST 12 MONTHS, YOU WORRIED THAT YOUR FOOD WOULD RUN OUT BEFORE YOU GOT MONEY TO BUY MORE.: NEVER TRUE

## 2025-01-23 ASSESSMENT — PATIENT HEALTH QUESTIONNAIRE - PHQ9
SUM OF ALL RESPONSES TO PHQ QUESTIONS 1-9: 0
SUM OF ALL RESPONSES TO PHQ QUESTIONS 1-9: 0
1. LITTLE INTEREST OR PLEASURE IN DOING THINGS: NOT AT ALL
SUM OF ALL RESPONSES TO PHQ9 QUESTIONS 1 & 2: 0
2. FEELING DOWN, DEPRESSED OR HOPELESS: NOT AT ALL
SUM OF ALL RESPONSES TO PHQ QUESTIONS 1-9: 0
SUM OF ALL RESPONSES TO PHQ QUESTIONS 1-9: 0

## 2025-01-23 NOTE — PROGRESS NOTES
Patient presents for their Medicare Annual Wellness Visit     Give patient following 3 words: (Star, Ring, Ship)     Last eye exam: 1 year ago   Last dental exam: 3 months ago   Exercise:  intermittently  Do you eat regularly balanced/healthy meals? Yes    How would you rate your overall health? : Fair            7/22/2024     2:25 PM 7/17/2023     9:33 AM 1/23/2023     7:50 AM 1/11/2022     3:04 PM 7/6/2021     9:33 AM 4/5/2021     8:54 AM 1/5/2021     1:52 PM   Fall Risk   Do you feel unsteady or are you worried about falling?  no no no       2 or more falls in past year? no no no no no no no   Fall with injury in past year? no no no no no no no           1/23/2025     2:05 PM 7/22/2024     2:25 PM 2/15/2024     1:07 PM 7/17/2023     9:33 AM 1/23/2023     7:50 AM 1/11/2022     3:04 PM 7/6/2021     9:32 AM   PHQ Scores   PHQ2 Score 0 0 0 0 0 0 0   PHQ9 Score 0 0 0 0 0 0 0         Do you always wear a seat belt in the car?: Yes      Have you noted any problems with hearing?: No  Have you noted any vision problems?: No  Do you have concerns about your sexual health?: no  In the past month how much has pain been an issue for you?:  Not at all  In the past month have you had issues with anxiety, loneliness, irritability or fatigue:  Not at all    Do you take opioid medications even sometimes? (if using assess risk and whether other treatments would be beneficial)    Living Will and/or Healthcare POA: Yes,   Copy requested      Healthcare Decision Maker:    Primary Decision Maker: Micheline Correia - Spouse - [phone]           Who lives at home with you: self, wife, oldest son   Do you have any pets? dog  Do you have any services coming to your home (meals on wheels, home health, etc) ?: no      Do you need help with:  Using the phone:  No  Bathing: No  Dressing:  No  Toileting: No  Transportation:  No  Shopping: No  Preparing meals: No  Housework/Laundry: No  Medications: No  Money management: No    Does your home 
Topics    Alcohol use: Yes     Comment: occasional alcohol use        ROS and PHYSICAL:   ROS:  10 point ROS otherwise negative except as mentioned in the HPI    VITALS:  Vitals:    01/23/25 1408   BP: 136/74   Pulse: 71   Temp: 97.8 °F (36.6 °C)   TempSrc: Infrared   SpO2: 99%   Weight: 77.6 kg (171 lb)      Body mass index is 23.85 kg/m².    PHYSICAL EXAM:  GENERAL: NAD, alert and oriented  HEENT: Head: NC/AT. Eyes: clear conjunctiva.    NECK: Supple, no LAD, no thyromegaly or thyroid nodules  RESPIRATORY: Normal respiratory effort, lungs CTAB no crackles, wheezes, or rhonchi  HEART: Regular rate and rhythm, no murmurs  SKIN: Skin color, texture, and turgor normal. No rash  PSYCH: Normal affect and speech    ASSESSMENT & PLAN:     74 y.o. male presents to the office for the following:    3. Asbestosis (HCC)  - Stable. Continue to monitor.    4. Smoker unmotivated to quit  - 40 pack year history. Previously discussed LDCT with Dr. Woodward. Patient not interested in smoking cessation at this time.  - Approximately 3 minutes was spent during this encounter on smoking cessation counseling     5. Essential hypertension  - Controlled. BP today 136/74. Continue current medication.    6. Mixed hyperlipidemia  - Stable per most recent lipid panel in October. Continue current dose of Lipitor.    7. Prediabetes  - Stable. Most recent A1C 5.7. Continue to monitor.    8. Idiopathic gout, unspecified chronicity, unspecified site  - Stable. Rarely needs to use colchicine. No frequent flares necessitating allopurinol. Continue to monitor.    9. Primary osteoarthritis involving multiple joints  - Stable. Currently taking Norco 5-325 mg Q8H PRN to manage pain. PDMP reviewed. Okay to continue.      *I have spent 40 minutes reviewing previous notes, test results and face to face with the patient discussing the diagnosis and importance of compliance with the treatment plan as well as documenting on the day of the visit.       RTO:

## 2025-02-11 DIAGNOSIS — M15.0 PRIMARY OSTEOARTHRITIS INVOLVING MULTIPLE JOINTS: ICD-10-CM

## 2025-02-11 RX ORDER — HYDROCODONE BITARTRATE AND ACETAMINOPHEN 5; 325 MG/1; MG/1
1 TABLET ORAL EVERY 8 HOURS PRN
Qty: 90 TABLET | Refills: 0 | Status: SHIPPED | OUTPATIENT
Start: 2025-02-11 | End: 2025-03-13

## 2025-02-11 NOTE — TELEPHONE ENCOUNTER
Medication:   Requested Prescriptions     Pending Prescriptions Disp Refills    HYDROcodone-acetaminophen (NORCO) 5-325 MG per tablet 90 tablet 0     Sig: Take 1 tablet by mouth every 8 hours as needed for Pain for up to 30 days.      Last Filled:  1/13/2025    Patient Phone Number: [phone] (home)     Last appt: 1/23/2025   Next appt: 4/24/2025    Last OARRS:       4/9/2019     5:31 PM   RX Monitoring   Attestation The Prescription Monitoring Report for this patient was reviewed today.     PDMP Monitoring:    Last PDMP Ted as Reviewed ([place]):  Review User Review Instant Review Result   STUART HAYWARD 1/24/2025  8:35 PM Reviewed PDMP [1]     Preferred Pharmacy:   [facility] [id number] - [place], [place] - [address] [facility] -  [phone] - F [phone]  [address] [facility] [address]  Phone: [phone] Fax: [phone]    [facility] [id number] - [facility] [address]  [place] [phone] - F [phone]  [address]  [facility] [address]  Phone: [phone] Fax: [phone]    [facility] - [place], [place] - [address] -  [phone] - F [phone]  [address] [address]  Phone: [phone] Fax: [phone]    [facility] #[id number] - [place], [place] - [address] [facility] - P [phone] - F [phone] [address] [facility] [address]  Phone: [phone] Fax: [phone]

## 2025-02-11 NOTE — TELEPHONE ENCOUNTER
Patient calling for a  medication refill.     HYDROcodone-acetaminophen (NORCO) 5-325 MG per tablet [[id number]]     [facility] [id number] - [place], [place] - [address] [place], [place] [address]TA[place] - [place] [phone] - F [phone] [[id number]]     Please advise

## 2025-02-26 DIAGNOSIS — M10.9 ACUTE GOUT INVOLVING TOE OF RIGHT FOOT, UNSPECIFIED CAUSE: ICD-10-CM

## 2025-02-26 RX ORDER — COLCHICINE 0.6 MG/1
TABLET ORAL
Qty: 60 TABLET | Refills: 1 | Status: SHIPPED | OUTPATIENT
Start: 2025-02-26

## 2025-03-12 DIAGNOSIS — M15.0 PRIMARY OSTEOARTHRITIS INVOLVING MULTIPLE JOINTS: ICD-10-CM

## 2025-03-12 RX ORDER — HYDROCODONE BITARTRATE AND ACETAMINOPHEN 5; 325 MG/1; MG/1
1 TABLET ORAL EVERY 8 HOURS PRN
Qty: 90 TABLET | Refills: 0 | Status: SHIPPED | OUTPATIENT
Start: 2025-03-12 | End: 2025-04-11

## 2025-03-12 NOTE — TELEPHONE ENCOUNTER
HYDROcodone-acetaminophen (NORCO) 5-325 MG per tablet    [facility] [id number] - [place], [place] - [address] [facility] [phone] - F [phone] [address] [facility] [address]

## 2025-03-12 NOTE — TELEPHONE ENCOUNTER
Medication:   Requested Prescriptions     Pending Prescriptions Disp Refills    HYDROcodone-acetaminophen (NORCO) 5-325 MG per tablet 90 tablet 0     Sig: Take 1 tablet by mouth every 8 hours as needed for Pain for up to 30 days.      Last Filled:  2/26/25    Patient Phone Number: [phone] (home)     Last appt: 1/23/2025   Next appt: 4/24/2025    Last OARRS:       4/9/2019     5:31 PM   RX Monitoring   Attestation The Prescription Monitoring Report for this patient was reviewed today.     PDMP Monitoring:    Last PDMP Ted as Reviewed ([place]):  Review User Review Instant Review Result   STUART HAYWARD 2/11/2025  9:31 AM Reviewed PDMP [1]     Preferred Pharmacy:     [facility] [id number] - [place], [place] - [address] [facility] [phone] - F [phone] [address] [facility] [address]  Phone: [phone] Fax: [phone]

## 2025-04-11 DIAGNOSIS — M15.0 PRIMARY OSTEOARTHRITIS INVOLVING MULTIPLE JOINTS: ICD-10-CM

## 2025-04-11 RX ORDER — HYDROCODONE BITARTRATE AND ACETAMINOPHEN 5; 325 MG/1; MG/1
1 TABLET ORAL EVERY 8 HOURS PRN
Qty: 90 TABLET | Refills: 0 | Status: SHIPPED | OUTPATIENT
Start: 2025-04-11 | End: 2025-05-11

## 2025-04-11 NOTE — TELEPHONE ENCOUNTER
Medication:   Requested Prescriptions     Pending Prescriptions Disp Refills    HYDROcodone-acetaminophen (NORCO) 5-325 MG per tablet 90 tablet 0     Sig: Take 1 tablet by mouth every 8 hours as needed for Pain for up to 30 days.      Last Filled:  3/12/2025    Patient Phone Number: [phone] (home)     Last appt: 1/23/2025   Next appt: 4/24/2025    Last OARRS:       4/9/2019     5:31 PM   RX Monitoring   Attestation The Prescription Monitoring Report for this patient was reviewed today.     PDMP Monitoring:    Last PDMP Ted as Reviewed ([place]):  Review User Review Instant Review Result   STUART LEYAV 3/12/2025 12:01 PM Reviewed PDMP [1]     Preferred Pharmacy:   [facility] [id number] - [place], [place] - [address] [facility] -  [phone] - F [phone]  [address] [facility] [address]  Phone: [phone] Fax: [phone]    [facility] [id number] - [facility] [address]  [place] [phone] - F [phone]  [address]  [facility] [address]  Phone: [phone] Fax: [phone]    [facility] - [place], [place] - [address] - P [phone] - F [phone]  [address] [address]  Phone: [phone] Fax: [phone]    [facility] #[id number] - [place], [place] - [address] [facility] - P [phone] - F [phone]  [address] [facility] [address]  Phone: [phone] Fax: [phone]

## 2025-04-11 NOTE — TELEPHONE ENCOUNTER
Pt called in requesting a refill on   HYDROcodone-acetaminophen (NORCO) 5-325 MG per tablet to be sent to   [facility] [id number] - [place], [place] - [address] [facility]

## 2025-04-24 ENCOUNTER — OFFICE VISIT (OUTPATIENT)
Dept: FAMILY MEDICINE CLINIC | Age: 75
End: 2025-04-24
Payer: MEDICARE

## 2025-04-24 VITALS
OXYGEN SATURATION: 97 % | HEIGHT: 71 IN | HEART RATE: 76 BPM | WEIGHT: 162 LBS | RESPIRATION RATE: 16 BRPM | BODY MASS INDEX: 22.68 KG/M2 | SYSTOLIC BLOOD PRESSURE: 112 MMHG | DIASTOLIC BLOOD PRESSURE: 64 MMHG

## 2025-04-24 DIAGNOSIS — F17.200 SMOKER: ICD-10-CM

## 2025-04-24 DIAGNOSIS — E78.2 MIXED HYPERLIPIDEMIA: ICD-10-CM

## 2025-04-24 DIAGNOSIS — M15.0 PRIMARY OSTEOARTHRITIS INVOLVING MULTIPLE JOINTS: Primary | ICD-10-CM

## 2025-04-24 DIAGNOSIS — M10.00 IDIOPATHIC GOUT, UNSPECIFIED CHRONICITY, UNSPECIFIED SITE: ICD-10-CM

## 2025-04-24 DIAGNOSIS — I10 ESSENTIAL HYPERTENSION: ICD-10-CM

## 2025-04-24 PROCEDURE — 1123F ACP DISCUSS/DSCN MKR DOCD: CPT | Performed by: STUDENT IN AN ORGANIZED HEALTH CARE EDUCATION/TRAINING PROGRAM

## 2025-04-24 PROCEDURE — 3074F SYST BP LT 130 MM HG: CPT | Performed by: STUDENT IN AN ORGANIZED HEALTH CARE EDUCATION/TRAINING PROGRAM

## 2025-04-24 PROCEDURE — G8420 CALC BMI NORM PARAMETERS: HCPCS | Performed by: STUDENT IN AN ORGANIZED HEALTH CARE EDUCATION/TRAINING PROGRAM

## 2025-04-24 PROCEDURE — G8427 DOCREV CUR MEDS BY ELIG CLIN: HCPCS | Performed by: STUDENT IN AN ORGANIZED HEALTH CARE EDUCATION/TRAINING PROGRAM

## 2025-04-24 PROCEDURE — 99214 OFFICE O/P EST MOD 30 MIN: CPT | Performed by: STUDENT IN AN ORGANIZED HEALTH CARE EDUCATION/TRAINING PROGRAM

## 2025-04-24 PROCEDURE — 3078F DIAST BP <80 MM HG: CPT | Performed by: STUDENT IN AN ORGANIZED HEALTH CARE EDUCATION/TRAINING PROGRAM

## 2025-04-24 PROCEDURE — 3017F COLORECTAL CA SCREEN DOC REV: CPT | Performed by: STUDENT IN AN ORGANIZED HEALTH CARE EDUCATION/TRAINING PROGRAM

## 2025-04-24 PROCEDURE — 1159F MED LIST DOCD IN RCRD: CPT | Performed by: STUDENT IN AN ORGANIZED HEALTH CARE EDUCATION/TRAINING PROGRAM

## 2025-04-24 PROCEDURE — G2211 COMPLEX E/M VISIT ADD ON: HCPCS | Performed by: STUDENT IN AN ORGANIZED HEALTH CARE EDUCATION/TRAINING PROGRAM

## 2025-04-24 PROCEDURE — 4004F PT TOBACCO SCREEN RCVD TLK: CPT | Performed by: STUDENT IN AN ORGANIZED HEALTH CARE EDUCATION/TRAINING PROGRAM

## 2025-04-24 RX ORDER — ATORVASTATIN CALCIUM 20 MG/1
TABLET, FILM COATED ORAL
Qty: 90 TABLET | Refills: 3 | Status: SHIPPED | OUTPATIENT
Start: 2025-04-24

## 2025-04-24 RX ORDER — LISINOPRIL AND HYDROCHLOROTHIAZIDE 12.5; 2 MG/1; MG/1
1 TABLET ORAL DAILY
Qty: 90 TABLET | Refills: 3 | Status: SHIPPED | OUTPATIENT
Start: 2025-04-24

## 2025-04-24 NOTE — PROGRESS NOTES
Office Note  2025  Patient Name: Dominguez Correia  MRN: [id number] : 1950    SUBJECTIVE:     CHIEF COMPLAINT:  Chief Complaint   Patient presents with    Hypertension       HISTORY OF PRESENT ILLNESS:  He presents today with the following concerns:    Dominguez is a 75 year old male seen today for his 3 month controlled substance appointment. His pain medication and a few of his chronic conditions were addressed at this visit.     Pain from primary osteoarthritis involving multiple joints:  Patient reports that pain is well controlled with 5-325 mg Norco. Pain is rated 2/10 in severity with medication. Takes two to three pills per day. Denies side effects from the medication. Pain remains stable since last visit.     Smoker motivated and in process of quitting:  Currently down to .5 PPD from .75 PPD from his last visit on 2025. Does not have a goal quitting date planned but is motivated to taper down to completely quitting.     Hypertension:  Controlled today at 112/64. Does not monitor BP at home. Checks O2 saturation at home with wife's monitor, typically good numbers. Adherent to medications.    Gout:  No concerns with gout or flare ups. Rarely takes colchicine.    Additional information:   No new blood work results at this visit. Resolved issues with refilling blood pressure medications through [facility].    Medications:  Current Outpatient Medications   Medication Sig Dispense Refill    lisinopril-hydroCHLOROthiazide (PRINZIDE;ZESTORETIC) 20-12.5 MG per tablet Take 1 tablet by mouth daily 90 tablet 3    atorvastatin (LIPITOR) 20 MG tablet TAKE 1 TABLET BY MOUTH ONCE  DAILY 90 tablet 3    HYDROcodone-acetaminophen (NORCO) 5-325 MG per tablet Take 1 tablet by mouth every 8 hours as needed for Pain for up to 30 days. 90 tablet 0    colchicine (COLCRYS) 0.6 MG tablet TAKE ONE TABLET BY MOUTH TWICE A DAY AS NEEDED FOR PAIN (GOUT) 60 tablet 1     No current facility-administered medications for this visit.

## 2025-05-09 DIAGNOSIS — M15.0 PRIMARY OSTEOARTHRITIS INVOLVING MULTIPLE JOINTS: ICD-10-CM

## 2025-05-09 RX ORDER — HYDROCODONE BITARTRATE AND ACETAMINOPHEN 5; 325 MG/1; MG/1
1 TABLET ORAL EVERY 8 HOURS PRN
Qty: 90 TABLET | Refills: 0 | Status: SHIPPED | OUTPATIENT
Start: 2025-05-09 | End: 2025-06-08

## 2025-05-09 NOTE — TELEPHONE ENCOUNTER
HYDROcodone-acetaminophen (NORCO) 5-325 MG per tablet      [facility] [id number] - [place], [place] - [address] [facility] -  [phone] - F [phone] [address] [facility] [address]  Phone: [phone]  Fax: [phone]

## 2025-05-09 NOTE — TELEPHONE ENCOUNTER
Medication:   Requested Prescriptions     Pending Prescriptions Disp Refills    HYDROcodone-acetaminophen (NORCO) 5-325 MG per tablet 90 tablet 0     Sig: Take 1 tablet by mouth every 8 hours as needed for Pain for up to 30 days.      Last Filled:  4/11/25    Patient Phone Number: [phone] (home)     Last appt: 4/24/2025   Next appt: 7/24/2025    Last OARRS:       4/9/2019     5:31 PM   RX Monitoring   Attestation The Prescription Monitoring Report for this patient was reviewed today.     PDMP Monitoring:    Last PDMP Ted as Reviewed ([place]):  Review User Review Instant Review Result   STUART LEYVA 3/12/2025 12:01 PM Reviewed PDMP [1]     Preferred Pharmacy:   [facility] [id number] - [place], [place] - [address] [facility] -  [phone] - F [phone]  [address] [facility] [address]  Phone: [phone] Fax: [phone]    [facility] [id number] - [facility] [address]  [place] [phone] - F [phone]  [address]  [facility] [address]  Phone: [phone] Fax: [phone]    [facility] - [place], [place] - [address] - P [phone] - F [phone]  [address] [address]  Phone: [phone] Fax: [phone]    [facility] #[id number] - [place], [place] - [address] [facility] - P [phone] - F [phone] [address] [facility] [address]  Phone: [phone] Fax: [phone]

## 2025-06-02 DIAGNOSIS — M10.9 ACUTE GOUT INVOLVING TOE OF RIGHT FOOT, UNSPECIFIED CAUSE: ICD-10-CM

## 2025-06-04 DIAGNOSIS — M15.0 PRIMARY OSTEOARTHRITIS INVOLVING MULTIPLE JOINTS: ICD-10-CM

## 2025-06-04 DIAGNOSIS — M10.9 ACUTE GOUT INVOLVING TOE OF RIGHT FOOT, UNSPECIFIED CAUSE: ICD-10-CM

## 2025-06-04 RX ORDER — COLCHICINE 0.6 MG/1
TABLET ORAL
Qty: 60 TABLET | Refills: 1 | Status: SHIPPED | OUTPATIENT
Start: 2025-06-04

## 2025-06-04 RX ORDER — HYDROCODONE BITARTRATE AND ACETAMINOPHEN 5; 325 MG/1; MG/1
1 TABLET ORAL EVERY 8 HOURS PRN
Qty: 90 TABLET | Refills: 0 | Status: SHIPPED | OUTPATIENT
Start: 2025-06-04 | End: 2025-07-04

## 2025-06-04 RX ORDER — COLCHICINE 0.6 MG/1
TABLET ORAL
Qty: 60 TABLET | Refills: 0 | OUTPATIENT
Start: 2025-06-04

## 2025-06-04 NOTE — TELEPHONE ENCOUNTER
Medication:   Requested Prescriptions     Pending Prescriptions Disp Refills    HYDROcodone-acetaminophen (NORCO) 5-325 MG per tablet 90 tablet 0     Sig: Take 1 tablet by mouth every 8 hours as needed for Pain for up to 30 days.     Signed Prescriptions Disp Refills    colchicine (COLCRYS) 0.6 MG tablet 60 tablet 1     Sig: TAKE ONE TABLET BY MOUTH TWICE A DAY AS NEEDED FOR PAIN (GOUT)     Authorizing Provider: STUART HAYWARD     Ordering User: HAMIDA SANCHEZ      Last Filled:  5/9    Patient Phone Number: [phone] (home)     Last appt: 4/24/2025   Next appt: 7/24/2025    Last OARRS:       4/9/2019     5:31 PM   RX Monitoring   Attestation The Prescription Monitoring Report for this patient was reviewed today.     PDMP Monitoring:    Last PDMP Ted as Reviewed ([place]):  Review User Review Instant Review Result   STUART LEYVA 3/12/2025 12:01 PM Reviewed PDMP [1]     Preferred Pharmacy:   [facility] [id number] - [place], [place] - [address] [facility] [phone] - F [phone]  [address] [facility] [address]  Phone: [phone] Fax: [phone]    [facility] [id number] - [place], [place] - [address]  [place] [phone] - F [phone]  [address]  [facility] [address]  Phone: [phone] Fax: [phone]    [facility] - [place], [place] - [address] -  [phone] - F [phone]  [address]  [facility] [address]  Phone: [phone] Fax: [phone]    [facility][id number] - [place], [place] - [address] [facility] - P [phone] - F [phone] [address] [facility] [address]  Phone: [phone] Fax: [phone]

## 2025-07-07 DIAGNOSIS — M15.0 PRIMARY OSTEOARTHRITIS INVOLVING MULTIPLE JOINTS: ICD-10-CM

## 2025-07-07 RX ORDER — HYDROCODONE BITARTRATE AND ACETAMINOPHEN 5; 325 MG/1; MG/1
1 TABLET ORAL EVERY 8 HOURS PRN
Qty: 90 TABLET | Refills: 0 | Status: SHIPPED | OUTPATIENT
Start: 2025-07-07 | End: 2025-08-06

## 2025-07-07 NOTE — TELEPHONE ENCOUNTER
Needs refill for     HYDROcodone-acetaminophen (NORCO) 5-325 MG per tablet      [facility] [id number] - [place], [place] - [address] [facility] -  [phone] - F [phone] [address] [facility] [address]  Phone: [phone]  Fax: [phone]

## 2025-08-04 DIAGNOSIS — M15.0 PRIMARY OSTEOARTHRITIS INVOLVING MULTIPLE JOINTS: ICD-10-CM

## 2025-08-04 RX ORDER — HYDROCODONE BITARTRATE AND ACETAMINOPHEN 5; 325 MG/1; MG/1
1 TABLET ORAL EVERY 8 HOURS PRN
Qty: 90 TABLET | Refills: 0 | Status: SHIPPED | OUTPATIENT
Start: 2025-08-04 | End: 2025-09-03

## 2025-08-12 ENCOUNTER — OFFICE VISIT (OUTPATIENT)
Dept: FAMILY MEDICINE CLINIC | Age: 75
End: 2025-08-12
Payer: MEDICARE

## 2025-08-12 VITALS
BODY MASS INDEX: 21.51 KG/M2 | DIASTOLIC BLOOD PRESSURE: 63 MMHG | HEIGHT: 72 IN | WEIGHT: 158.8 LBS | SYSTOLIC BLOOD PRESSURE: 112 MMHG | HEART RATE: 76 BPM | RESPIRATION RATE: 15 BRPM | OXYGEN SATURATION: 97 % | TEMPERATURE: 97.7 F

## 2025-08-12 DIAGNOSIS — Z53.20 LUNG CANCER SCREENING DECLINED BY PATIENT: ICD-10-CM

## 2025-08-12 DIAGNOSIS — I10 ESSENTIAL HYPERTENSION: ICD-10-CM

## 2025-08-12 DIAGNOSIS — E78.2 MIXED HYPERLIPIDEMIA: ICD-10-CM

## 2025-08-12 DIAGNOSIS — R73.03 PREDIABETES: ICD-10-CM

## 2025-08-12 DIAGNOSIS — L84 CALLUS OF FOOT: Primary | ICD-10-CM

## 2025-08-12 DIAGNOSIS — Z87.891 PERSONAL HISTORY OF TOBACCO USE: ICD-10-CM

## 2025-08-12 DIAGNOSIS — Z12.5 SCREENING FOR PROSTATE CANCER: ICD-10-CM

## 2025-08-12 PROCEDURE — 1123F ACP DISCUSS/DSCN MKR DOCD: CPT | Performed by: STUDENT IN AN ORGANIZED HEALTH CARE EDUCATION/TRAINING PROGRAM

## 2025-08-12 PROCEDURE — G2211 COMPLEX E/M VISIT ADD ON: HCPCS | Performed by: STUDENT IN AN ORGANIZED HEALTH CARE EDUCATION/TRAINING PROGRAM

## 2025-08-12 PROCEDURE — G8420 CALC BMI NORM PARAMETERS: HCPCS | Performed by: STUDENT IN AN ORGANIZED HEALTH CARE EDUCATION/TRAINING PROGRAM

## 2025-08-12 PROCEDURE — 3017F COLORECTAL CA SCREEN DOC REV: CPT | Performed by: STUDENT IN AN ORGANIZED HEALTH CARE EDUCATION/TRAINING PROGRAM

## 2025-08-12 PROCEDURE — 99214 OFFICE O/P EST MOD 30 MIN: CPT | Performed by: STUDENT IN AN ORGANIZED HEALTH CARE EDUCATION/TRAINING PROGRAM

## 2025-08-12 PROCEDURE — G8427 DOCREV CUR MEDS BY ELIG CLIN: HCPCS | Performed by: STUDENT IN AN ORGANIZED HEALTH CARE EDUCATION/TRAINING PROGRAM

## 2025-08-12 PROCEDURE — 3078F DIAST BP <80 MM HG: CPT | Performed by: STUDENT IN AN ORGANIZED HEALTH CARE EDUCATION/TRAINING PROGRAM

## 2025-08-12 PROCEDURE — 4004F PT TOBACCO SCREEN RCVD TLK: CPT | Performed by: STUDENT IN AN ORGANIZED HEALTH CARE EDUCATION/TRAINING PROGRAM

## 2025-08-12 PROCEDURE — 1159F MED LIST DOCD IN RCRD: CPT | Performed by: STUDENT IN AN ORGANIZED HEALTH CARE EDUCATION/TRAINING PROGRAM

## 2025-08-12 PROCEDURE — 3074F SYST BP LT 130 MM HG: CPT | Performed by: STUDENT IN AN ORGANIZED HEALTH CARE EDUCATION/TRAINING PROGRAM

## 2025-08-12 PROCEDURE — G0296 VISIT TO DETERM LDCT ELIG: HCPCS | Performed by: STUDENT IN AN ORGANIZED HEALTH CARE EDUCATION/TRAINING PROGRAM

## 2025-08-29 ENCOUNTER — TELEPHONE (OUTPATIENT)
Dept: PHARMACY | Facility: CLINIC | Age: 75
End: 2025-08-29

## 2025-09-04 DIAGNOSIS — M15.0 PRIMARY OSTEOARTHRITIS INVOLVING MULTIPLE JOINTS: ICD-10-CM

## 2025-09-04 RX ORDER — HYDROCODONE BITARTRATE AND ACETAMINOPHEN 5; 325 MG/1; MG/1
1 TABLET ORAL EVERY 8 HOURS PRN
Qty: 90 TABLET | Refills: 0 | Status: SHIPPED | OUTPATIENT
Start: 2025-09-04 | End: 2025-10-04